# Patient Record
Sex: FEMALE | Race: WHITE | Employment: UNEMPLOYED | URBAN - METROPOLITAN AREA
[De-identification: names, ages, dates, MRNs, and addresses within clinical notes are randomized per-mention and may not be internally consistent; named-entity substitution may affect disease eponyms.]

---

## 2017-08-28 ENCOUNTER — HOSPITAL ENCOUNTER (OUTPATIENT)
Dept: OTHER | Age: 27
Discharge: HOME | End: 2017-08-28
Attending: FAMILY MEDICINE | Admitting: FAMILY MEDICINE

## 2017-08-28 LAB
BASOPHILS ABSOLUTE: 0 K/UL (ref 0–0.2)
BASOPHILS RELATIVE PERCENT: 0.8 %
EOSINOPHILS ABSOLUTE: 0.1 K/UL (ref 0–0.6)
EOSINOPHILS RELATIVE PERCENT: 1.8 %
HBV SURFACE AB TITR SER: 486 MUL/ML
HCT VFR BLD CALC: 43.1 % (ref 36–48)
HEMOGLOBIN: 14.6 G/DL (ref 12–16)
HEPATITIS B SURFACE ANTIGEN INTERPRETATION: NORMAL
HEPATITIS C ANTIBODY INTERPRETATION: NORMAL
LYMPHOCYTES ABSOLUTE: 1.8 K/UL (ref 1–5.1)
LYMPHOCYTES RELATIVE PERCENT: 32.9 %
MCH RBC QN AUTO: 30.6 PG (ref 26–34)
MCHC RBC AUTO-ENTMCNC: 33.8 G/DL (ref 31–36)
MCV RBC AUTO: 90.5 FL (ref 80–100)
MONOCYTES ABSOLUTE: 0.3 K/UL (ref 0–1.3)
MONOCYTES RELATIVE PERCENT: 5 %
NEUTROPHILS ABSOLUTE: 3.2 K/UL (ref 1.7–7.7)
NEUTROPHILS RELATIVE PERCENT: 59.5 %
PDW BLD-RTO: 13.4 % (ref 12.4–15.4)
PLATELET # BLD: 152 K/UL (ref 135–450)
PMV BLD AUTO: 12.6 FL (ref 5–10.5)
RBC # BLD: 4.76 M/UL (ref 4–5.2)
WBC # BLD: 5.4 K/UL (ref 4–11)

## 2017-08-29 LAB
A/G RATIO: 1.3 (ref 1.1–2.2)
ALBUMIN SERPL-MCNC: 4.5 G/DL (ref 3.4–5)
ALP BLD-CCNC: 84 U/L (ref 40–129)
ALT SERPL-CCNC: 12 U/L (ref 10–40)
ANION GAP SERPL CALCULATED.3IONS-SCNC: 14 MMOL/L (ref 3–16)
AST SERPL-CCNC: 14 U/L (ref 15–37)
BILIRUB SERPL-MCNC: 0.6 MG/DL (ref 0–1)
BUN BLDV-MCNC: 13 MG/DL (ref 7–20)
CALCIUM SERPL-MCNC: 9.7 MG/DL (ref 8.3–10.6)
CHLORIDE BLD-SCNC: 102 MMOL/L (ref 99–110)
CO2: 24 MMOL/L (ref 21–32)
CREAT SERPL-MCNC: 0.6 MG/DL (ref 0.6–1.1)
GFR AFRICAN AMERICAN: >60
GFR NON-AFRICAN AMERICAN: >60
GLOBULIN: 3.5 G/DL
GLUCOSE BLD-MCNC: 83 MG/DL (ref 70–99)
HIV-1 AND HIV-2 ANTIBODIES: NORMAL
POTASSIUM SERPL-SCNC: 4.5 MMOL/L (ref 3.5–5.1)
RPR: NORMAL
SODIUM BLD-SCNC: 140 MMOL/L (ref 136–145)
TOTAL PROTEIN: 8 G/DL (ref 6.4–8.2)

## 2018-02-28 ENCOUNTER — HOSPITAL ENCOUNTER (OUTPATIENT)
Dept: OTHER | Age: 28
Discharge: HOME | End: 2018-02-28
Attending: PSYCHIATRY & NEUROLOGY | Admitting: PSYCHIATRY & NEUROLOGY

## 2018-03-01 LAB
A/G RATIO: 1.7 (ref 1.1–2.2)
ALBUMIN SERPL-MCNC: 4.7 G/DL (ref 3.4–5)
ALP BLD-CCNC: 68 U/L (ref 40–129)
ALT SERPL-CCNC: 11 U/L (ref 10–40)
ANION GAP SERPL CALCULATED.3IONS-SCNC: 11 MMOL/L (ref 3–16)
AST SERPL-CCNC: 13 U/L (ref 15–37)
BILIRUB SERPL-MCNC: 0.3 MG/DL (ref 0–1)
BUN BLDV-MCNC: 17 MG/DL (ref 7–20)
CALCIUM SERPL-MCNC: 9.1 MG/DL (ref 8.3–10.6)
CHLORIDE BLD-SCNC: 104 MMOL/L (ref 99–110)
CO2: 25 MMOL/L (ref 21–32)
CREAT SERPL-MCNC: 0.7 MG/DL (ref 0.6–1.1)
GFR AFRICAN AMERICAN: >60
GFR NON-AFRICAN AMERICAN: >60
GLOBULIN: 2.8 G/DL
GLUCOSE BLD-MCNC: 89 MG/DL (ref 70–99)
POTASSIUM SERPL-SCNC: 4.3 MMOL/L (ref 3.5–5.1)
SODIUM BLD-SCNC: 140 MMOL/L (ref 136–145)
TOTAL PROTEIN: 7.5 G/DL (ref 6.4–8.2)
TSH SERPL DL<=0.05 MIU/L-ACNC: 0.67 UIU/ML (ref 0.27–4.2)

## 2020-02-04 ENCOUNTER — HOSPITAL ENCOUNTER (EMERGENCY)
Age: 30
Discharge: HOME | End: 2020-02-04
Attending: EMERGENCY MEDICINE
Payer: COMMERCIAL

## 2020-02-04 ENCOUNTER — APPOINTMENT (OUTPATIENT)
Dept: ULTRASOUND IMAGING | Age: 30
End: 2020-02-04
Payer: COMMERCIAL

## 2020-02-04 VITALS
BODY MASS INDEX: 16.46 KG/M2 | WEIGHT: 115 LBS | HEART RATE: 87 BPM | OXYGEN SATURATION: 97 % | TEMPERATURE: 98.3 F | DIASTOLIC BLOOD PRESSURE: 92 MMHG | SYSTOLIC BLOOD PRESSURE: 135 MMHG | HEIGHT: 70 IN | RESPIRATION RATE: 16 BRPM

## 2020-02-04 LAB
A/G RATIO: 1.4 (ref 1.1–2.2)
ABO/RH: NORMAL
ALBUMIN SERPL-MCNC: 4.1 G/DL (ref 3.4–5)
ALP BLD-CCNC: 52 U/L (ref 40–129)
ALT SERPL-CCNC: 22 U/L (ref 10–40)
ANION GAP SERPL CALCULATED.3IONS-SCNC: 9 MMOL/L (ref 3–16)
AST SERPL-CCNC: 18 U/L (ref 15–37)
BACTERIA WET PREP: ABNORMAL
BACTERIA: ABNORMAL /HPF
BASOPHILS ABSOLUTE: 0.1 K/UL (ref 0–0.2)
BASOPHILS RELATIVE PERCENT: 1.6 %
BILIRUB SERPL-MCNC: 0.4 MG/DL (ref 0–1)
BILIRUBIN URINE: NEGATIVE
BLOOD, URINE: ABNORMAL
BUN BLDV-MCNC: 13 MG/DL (ref 7–20)
CALCIUM SERPL-MCNC: 9.1 MG/DL (ref 8.3–10.6)
CHLORIDE BLD-SCNC: 103 MMOL/L (ref 99–110)
CLARITY: CLEAR
CLUE CELLS: ABNORMAL
CO2: 23 MMOL/L (ref 21–32)
COLOR: YELLOW
CREAT SERPL-MCNC: <0.5 MG/DL (ref 0.6–1.1)
EOSINOPHILS ABSOLUTE: 0.1 K/UL (ref 0–0.6)
EOSINOPHILS RELATIVE PERCENT: 1 %
EPITHELIAL CELLS WET PREP: ABNORMAL
EPITHELIAL CELLS, UA: ABNORMAL /HPF
GFR AFRICAN AMERICAN: >60
GFR NON-AFRICAN AMERICAN: >60
GLOBULIN: 2.9 G/DL
GLUCOSE BLD-MCNC: 86 MG/DL (ref 70–99)
GLUCOSE URINE: NEGATIVE MG/DL
GONADOTROPIN, CHORIONIC (HCG) QUANT: 7697 MIU/ML
HCT VFR BLD CALC: 36.2 % (ref 36–48)
HEMOGLOBIN: 12.4 G/DL (ref 12–16)
KETONES, URINE: NEGATIVE MG/DL
LEUKOCYTE ESTERASE, URINE: ABNORMAL
LYMPHOCYTES ABSOLUTE: 2.7 K/UL (ref 1–5.1)
LYMPHOCYTES RELATIVE PERCENT: 39.4 %
MCH RBC QN AUTO: 30.7 PG (ref 26–34)
MCHC RBC AUTO-ENTMCNC: 34.2 G/DL (ref 31–36)
MCV RBC AUTO: 89.7 FL (ref 80–100)
MICROSCOPIC EXAMINATION: YES
MONOCYTES ABSOLUTE: 0.3 K/UL (ref 0–1.3)
MONOCYTES RELATIVE PERCENT: 3.8 %
NEUTROPHILS ABSOLUTE: 3.8 K/UL (ref 1.7–7.7)
NEUTROPHILS RELATIVE PERCENT: 54.2 %
NITRITE, URINE: NEGATIVE
PDW BLD-RTO: 12.5 % (ref 12.4–15.4)
PH UA: 6 (ref 5–8)
PLATELET # BLD: 147 K/UL (ref 135–450)
PMV BLD AUTO: 10.7 FL (ref 5–10.5)
POTASSIUM SERPL-SCNC: 3.9 MMOL/L (ref 3.5–5.1)
PROTEIN UA: NEGATIVE MG/DL
RBC # BLD: 4.04 M/UL (ref 4–5.2)
RBC UA: ABNORMAL /HPF (ref 0–2)
RBC WET PREP: ABNORMAL
SODIUM BLD-SCNC: 135 MMOL/L (ref 136–145)
SOURCE WET PREP: ABNORMAL
SPECIFIC GRAVITY UA: >=1.03 (ref 1–1.03)
TOTAL PROTEIN: 7 G/DL (ref 6.4–8.2)
TRICHOMONAS PREP: ABNORMAL
URINE TYPE: ABNORMAL
UROBILINOGEN, URINE: 1 E.U./DL
WBC # BLD: 7 K/UL (ref 4–11)
WBC UA: ABNORMAL /HPF (ref 0–5)
WBC WET PREP: ABNORMAL
YEAST WET PREP: ABNORMAL

## 2020-02-04 PROCEDURE — 87491 CHLMYD TRACH DNA AMP PROBE: CPT

## 2020-02-04 PROCEDURE — 99284 EMERGENCY DEPT VISIT MOD MDM: CPT

## 2020-02-04 PROCEDURE — 81001 URINALYSIS AUTO W/SCOPE: CPT

## 2020-02-04 PROCEDURE — 6370000000 HC RX 637 (ALT 250 FOR IP): Performed by: NURSE PRACTITIONER

## 2020-02-04 PROCEDURE — 87210 SMEAR WET MOUNT SALINE/INK: CPT

## 2020-02-04 PROCEDURE — 87591 N.GONORRHOEAE DNA AMP PROB: CPT

## 2020-02-04 PROCEDURE — 86901 BLOOD TYPING SEROLOGIC RH(D): CPT

## 2020-02-04 PROCEDURE — 76801 OB US < 14 WKS SINGLE FETUS: CPT

## 2020-02-04 PROCEDURE — 80053 COMPREHEN METABOLIC PANEL: CPT

## 2020-02-04 PROCEDURE — 86900 BLOOD TYPING SEROLOGIC ABO: CPT

## 2020-02-04 PROCEDURE — 85025 COMPLETE CBC W/AUTO DIFF WBC: CPT

## 2020-02-04 PROCEDURE — 84702 CHORIONIC GONADOTROPIN TEST: CPT

## 2020-02-04 RX ORDER — CEPHALEXIN 500 MG/1
500 CAPSULE ORAL 2 TIMES DAILY
Qty: 14 CAPSULE | Refills: 0 | Status: SHIPPED | OUTPATIENT
Start: 2020-02-04 | End: 2020-02-11

## 2020-02-04 RX ORDER — VITAMIN A ACETATE, BETA CAROTENE, ASCORBIC ACID, CHOLECALCIFEROL, .ALPHA.-TOCOPHEROL ACETATE, DL-, THIAMINE MONONITRATE, RIBOFLAVIN, NIACINAMIDE, PYRIDOXINE HYDROCHLORIDE, FOLIC ACID, CYANOCOBALAMIN, CALCIUM CARBONATE, FERROUS FUMARATE, ZINC OXIDE, CUPRIC OXIDE 3080; 12; 120; 400; 1; 1.84; 3; 20; 22; 920; 25; 200; 27; 10; 2 [IU]/1; UG/1; MG/1; [IU]/1; MG/1; MG/1; MG/1; MG/1; MG/1; [IU]/1; MG/1; MG/1; MG/1; MG/1; MG/1
1 TABLET, FILM COATED ORAL DAILY
Qty: 30 TABLET | Refills: 0 | Status: SHIPPED | OUTPATIENT
Start: 2020-02-04 | End: 2020-06-01 | Stop reason: SDUPTHER

## 2020-02-04 RX ORDER — METRONIDAZOLE 250 MG/1
2000 TABLET ORAL ONCE
Status: COMPLETED | OUTPATIENT
Start: 2020-02-04 | End: 2020-02-04

## 2020-02-04 RX ADMIN — METRONIDAZOLE 2000 MG: 250 TABLET ORAL at 18:10

## 2020-02-04 NOTE — ED PROVIDER NOTES
[facility] Emergency Department    CHIEF COMPLAINT  Vaginal Bleeding (was about 5-7 weeks pregnant has not OBGYN yet. Positive home test. Started bleeding last night, bright red blood. Denies any pain, cramping, or passing any clots. pt is wearing the same pad from last night. )      HISTORY OF PRESENT ILLNESS  Virgilio Dejesus is a 26 y.o. female who presents to the ED complaining of vaginal bleeding. Patient is G4, P3. Patient reports last menstrual period was December 18. Patient reports that she took 4 on home urine pregnancy test this past Friday which were all positive. Patient reports bright red vaginal bleeding started yesterday. Patient reports that the bleeding is light. Patient reports she has had the same pad on further the last 24 hours and has not had to change it. Patient denies fever, chills, body aches, dizziness, syncope, chest pain, shortness of breath, abdominal pain, nausea, vomiting, diarrhea, constipation, dysuria, hematuria, urinary frequency or urgency, flank pain. Patient denies identifiable aggravating or alleviating factors. Patient is not established with an OB/GYN and has not received any prenatal care for this pregnancy. No other complaints, modifying factors or associated symptoms. Nursing notes reviewed. Past Medical History:   Diagnosis Date    Heroin abuse ([facility] [address]     Scarlet fever     Strep throat      Past Surgical History:   Procedure Laterality Date    TONSILLECTOMY AND ADENOIDECTOMY       History reviewed. No pertinent family history.   Social History     Socioeconomic History    Marital status: Single     Spouse name: Not on file    Number of children: Not on file    Years of education: Not on file    Highest education level: Not on file   Occupational History    Not on file   Social Needs    Financial resource strain: Not on file    Food insecurity:     Worry: Not on file     Inability: Not on file   [employer] grossly intact. ENT: Mucous membranes are moist.   NECK: Supple. Normal ROM. HEART: RRR. No murmurs. Distal pulses are equal and intact. Cap refill less than 2 seconds. LUNGS: Respirations unlabored. CTAB. Good air exchange. Speaking comfortably in full sentences. No wheezing, rhonchi, rales, stridor. ABDOMEN: Soft. Non-distended. Non-tender. No guarding or rebound. No masses. No organomegaly. No rigidity. Bowel sounds are present in all 4 quadrants. Negative blanchard's. Negative McBurney's point. Negative CVA tenderness. EXTREMITIES: No peripheral edema. Moves all extremities equally. All extremities neurovascularly intact. SKIN: Warm and dry. No acute rashes. NEUROLOGICAL: Alert and oriented. CN's 2-12 intact. No gross facial drooping. Strength 5/5, sensation intact. No dysarthria. No dysmetria. No ataxia. PSYCHIATRIC: Normal mood and affect. : Vagina is nontender without fusions. There was small amount of dark red blood mixed with malodorous cervical discharge noted to the vaginal vault. The cervical os is observed to be closed, no friability. Bimanual exam revealed no adenexal tenderness, fullness, or masses. There is no CMT. Uterus is not enlarged, non-tender, or deviated. SCREENINGS       RADIOLOGY  Us Transabdominal Transvaginal Less Than 14 Weeks    Result Date: 2/4/2020  EXAMINATION: TRANSABDOMINAL AND TRANSVAGINAL FIRST TRIMESTER OBSTETRIC PELVIC ULTRASOUND WITH COLOR DOPPLER FLOW 2/4/2020 TECHNIQUE: TRANSABDOMINAL AND TRANSVAGINAL PELVIC ULTRASOUND WITH COLOR DOPPLER FLOW COMPARISON: None. HISTORY: ORDERING SYSTEM PROVIDED HISTORY: vaginal bleeding TECHNOLOGIST PROVIDED HISTORY: Reason for exam:->vaginal bleeding FINDINGS: A gestational sac containing a single fetal pole/yolk sac is identified within uterine fundus. Mean crown-rump length of the fetus is 0.41 cm corresponding to estimated gestational age as determined by ultrasound of 6 weeks 1 day +/-1 week.   Estimated date of Range    WBC 7.0 4.0 - 11.0 K/uL    RBC 4.04 4.00 - 5.20 M/uL    Hemoglobin 12.4 12.0 - 16.0 g/dL    Hematocrit 36.2 36.0 - 48.0 %    MCV 89.7 80.0 - 100.0 fL    MCH 30.7 26.0 - 34.0 pg    MCHC 34.2 31.0 - 36.0 g/dL    RDW 12.5 12.4 - 15.4 %    Platelets 752 084 - 085 K/uL    MPV 10.7 (H) 5.0 - 10.5 fL    Neutrophils % 54.2 %    Lymphocytes % 39.4 %    Monocytes % 3.8 %    Eosinophils % 1.0 %    Basophils % 1.6 %    Neutrophils Absolute 3.8 1.7 - 7.7 K/uL    Lymphocytes Absolute 2.7 1.0 - 5.1 K/uL    Monocytes Absolute 0.3 0.0 - 1.3 K/uL    Eosinophils Absolute 0.1 0.0 - 0.6 K/uL    Basophils Absolute 0.1 0.0 - 0.2 K/uL   Comprehensive Metabolic Panel   Result Value Ref Range    Sodium 135 (L) 136 - 145 mmol/L    Potassium 3.9 3.5 - 5.1 mmol/L    Chloride 103 99 - 110 mmol/L    CO2 23 21 - 32 mmol/L    Anion Gap 9 3 - 16    Glucose 86 70 - 99 mg/dL    BUN 13 7 - 20 mg/dL    CREATININE <0.5 (L) 0.6 - 1.1 mg/dL    GFR Non-African American >60 >60    GFR African American >60 >60    Calcium 9.1 8.3 - 10.6 mg/dL    Total Protein 7.0 6.4 - 8.2 g/dL    Alb 4.1 3.4 - 5.0 g/dL    Albumin/Globulin Ratio 1.4 1.1 - 2.2    Total Bilirubin 0.4 0.0 - 1.0 mg/dL    Alkaline Phosphatase 52 40 - 129 U/L    ALT 22 10 - 40 U/L    AST 18 15 - 37 U/L    Globulin 2.9 g/dL   HCG, Quantitative, Pregnancy   Result Value Ref Range    hCG Quant 7697.0 <5.0 mIU/mL   Urinalysis, reflex to microscopic   Result Value Ref Range    Color, UA Yellow Straw/Yellow    Clarity, UA Clear Clear    Glucose, Ur Negative Negative mg/dL    Bilirubin Urine Negative Negative    Ketones, Urine Negative Negative mg/dL    Specific Gravity, UA >=1.030 1.005 - 1.030    Blood, Urine SMALL (A) Negative    pH, UA 6.0 5.0 - 8.0    Protein, UA Negative Negative mg/dL    Urobilinogen, Urine 1.0 <2.0 E.U./dL    Nitrite, Urine Negative Negative    Leukocyte Esterase, Urine TRACE (A) Negative    Microscopic Examination YES     Urine Type NotGiven    Microscopic Urinalysis Result Value Ref Range    WBC, UA 20-50 (A) 0 - 5 /HPF    RBC, UA 5-10 (A) 0 - 2 /HPF    Epi Cells 5-10 /HPF    Bacteria, UA 1+ (A) /HPF   ABO/RH   Result Value Ref Range    ABO/Rh A POS        I estimate there is LOW risk for ACUTE APPENDICITIS, BOWEL OBSTRUCTION, CHOLECYSTITIS, DIVERTICULITIS, INCARCERATED HERNIA, PANCREATITIS, PELVIC INFLAMMATORY DISEASE, PERFORATED BOWEL or ULCER, or TUBO-OVARIAN ABSCESS, thus I consider the discharge disposition reasonable. Also, there is no evidence or peritonitis, sepsis, or toxicity. Vinnie De Leon and I have discussed the diagnosis and risks, and we agree with discharging home to follow-up with their primary doctor. We also discussed returning to the Emergency Department immediately if new or worsening symptoms occur. We have discussed the symptoms which are most concerning (e.g., bloody stool, fever, changing or worsening pain, vomiting) that necessitate immediate return. Final Impression    1. Vaginal bleeding in pregnancy    2. Trichomonosis    3. Bacterial vaginosis    4. Acute cystitis without hematuria        Blood pressure (!) 135/92, pulse 87, temperature 98.3 °F (36.8 °C), temperature source Oral, resp. rate 16, height 5' 10\" (1.778 m), weight 115 lb (52.2 kg), last menstrual period 12/18/2019, SpO2 97 %, not currently breastfeeding.mdm    Patient was sent home with a prescription for below medication/s. I did [place] patient on appropriate use of these medication.   Discharge Medication List as of 2/4/2020  7:05 PM      START taking these medications    Details   Prenatal Vit-Fe Fumarate-FA (PRENATAL PLUS) 27-1 MG TABS Take 1 tablet by mouth daily, Disp-30 tablet, R-0Print      cephALEXin (KEFLEX) 500 MG capsule Take 1 capsule by mouth 2 times daily for 7 days, Disp-14 capsule, R-0Print                 FOLLOW UP  Carla Camacho,   [address] [address] [facility] [id number] [phone]    Schedule an appointment as soon as possible for a visit   follow up    [facility]  ED  [address]  Go to   As needed, If symptoms worsen      DISPOSITION  Patient was discharged to home in good condition. Comment: Please note this report has been produced using speech recognition software and may contain errors related to that system including errors in grammar, punctuation, and spelling, as well as words and phrases that may be inappropriate. If there are any questions or concerns please feel free to contact the dictating provider for clarification.             [id number], APRN - CNP  02/04/20 2025

## 2020-02-04 NOTE — ED PROVIDER NOTES
this plan and is discharged home. FINAL IMPRESSION      1. Vaginal bleeding in pregnancy    2. Trichomonosis    3. Bacterial vaginosis    4.  Acute cystitis without hematuria               Mary Jo Hua MD  02/04/20 [id number]

## 2020-02-05 LAB
C TRACH DNA GENITAL QL NAA+PROBE: NEGATIVE
N. GONORRHOEAE DNA: NEGATIVE

## 2020-02-07 ENCOUNTER — OFFICE VISIT (OUTPATIENT)
Dept: OBGYN CLINIC | Age: 30
End: 2020-02-07
Payer: COMMERCIAL

## 2020-02-07 ENCOUNTER — INITIAL PRENATAL (OUTPATIENT)
Dept: OBGYN CLINIC | Age: 30
End: 2020-02-07
Payer: COMMERCIAL

## 2020-02-07 VITALS
DIASTOLIC BLOOD PRESSURE: 64 MMHG | HEART RATE: 66 BPM | BODY MASS INDEX: 16.5 KG/M2 | SYSTOLIC BLOOD PRESSURE: 98 MMHG | WEIGHT: 115 LBS

## 2020-02-07 LAB
ABO/RH: NORMAL
ANTIBODY SCREEN: NORMAL
BASOPHILS ABSOLUTE: 0.1 K/UL (ref 0–0.2)
BASOPHILS RELATIVE PERCENT: 1 %
BILIRUBIN URINE: NEGATIVE
BLOOD, URINE: NEGATIVE
CLARITY: CLEAR
COLOR: YELLOW
CRL: NORMAL
EOSINOPHILS ABSOLUTE: 0.1 K/UL (ref 0–0.6)
EOSINOPHILS RELATIVE PERCENT: 1.6 %
EPITHELIAL CELLS, UA: 3 /HPF (ref 0–5)
GLUCOSE URINE: NEGATIVE MG/DL
HCT VFR BLD CALC: 38.7 % (ref 36–48)
HEMOGLOBIN: 13 G/DL (ref 12–16)
HYALINE CASTS: 6 /LPF (ref 0–8)
KETONES, URINE: NEGATIVE MG/DL
LEUKOCYTE ESTERASE, URINE: NEGATIVE
LYMPHOCYTES ABSOLUTE: 2.6 K/UL (ref 1–5.1)
LYMPHOCYTES RELATIVE PERCENT: 44.1 %
MCH RBC QN AUTO: 30.5 PG (ref 26–34)
MCHC RBC AUTO-ENTMCNC: 33.6 G/DL (ref 31–36)
MCV RBC AUTO: 90.8 FL (ref 80–100)
MICROSCOPIC EXAMINATION: NORMAL
MONOCYTES ABSOLUTE: 0.2 K/UL (ref 0–1.3)
MONOCYTES RELATIVE PERCENT: 4.1 %
NEUTROPHILS ABSOLUTE: 2.8 K/UL (ref 1.7–7.7)
NEUTROPHILS RELATIVE PERCENT: 49.2 %
NITRITE, URINE: NEGATIVE
PDW BLD-RTO: 12.5 % (ref 12.4–15.4)
PH UA: 6 (ref 5–8)
PLATELET # BLD: 155 K/UL (ref 135–450)
PMV BLD AUTO: 12.1 FL (ref 5–10.5)
PROTEIN UA: NEGATIVE MG/DL
RBC # BLD: 4.26 M/UL (ref 4–5.2)
RBC UA: 3 /HPF (ref 0–4)
RUBELLA ANTIBODY IGG: 147.3 IU/ML
SAC DIAMETER: NORMAL
SPECIFIC GRAVITY UA: 1.02 (ref 1–1.03)
TSH REFLEX: 1.1 UIU/ML (ref 0.27–4.2)
URINE TYPE: NORMAL
UROBILINOGEN, URINE: 0.2 E.U./DL
WBC # BLD: 5.8 K/UL (ref 4–11)
WBC UA: 2 /HPF (ref 0–5)

## 2020-02-07 PROCEDURE — 99204 OFFICE O/P NEW MOD 45 MIN: CPT | Performed by: OBSTETRICS & GYNECOLOGY

## 2020-02-07 PROCEDURE — 76801 OB US < 14 WKS SINGLE FETUS: CPT | Performed by: OBSTETRICS & GYNECOLOGY

## 2020-02-07 PROCEDURE — 36415 COLL VENOUS BLD VENIPUNCTURE: CPT | Performed by: OBSTETRICS & GYNECOLOGY

## 2020-02-07 NOTE — PROGRESS NOTES
Temp: 98.3 f oral    Maternal emotional well being screening form completed and reviewed with patient. Current score is 0. Patient given referral to [address] [place] [address] ([phone]): No      Patient needs to have Urine Drug Screen at follow up appointment      Blood draw from R Antecubital x 1 attempt without difficulty. 2 SST, 1 PST, 2 LAV tubes drawn. Patient tolerated well.  Ximena Sevilla

## 2020-02-07 NOTE — PROGRESS NOTES
[facility]       [place] History and Physical      HPI: Ivonne Blancas is a 26 y.o. [id number] who presents for new OB visit. She had a home (+) Pregnancy test a few weeks ago, very sure of her dates. Patient's last menstrual period was 12/18/2019. States on Monday she started having vaginal bleeding, states it was like a heavy period, then Tuesday it persistent and she started cramping, went to the ED where she was diagnosed with IUP and Trichomonas infection. She was also diagnosed with UTI. She was given antibiotics and instructions for partner to be treated prior to resuming intercourse - today she verbalizes understanding and adherence to these instructions. States bleeding is now very light now. Denies pelvic pain / cramping. 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 Has been using Prenatal Vitamins prescribed from ED. Denies nausea / emesis. Denies fevers / chills / SOB / CP. Denies dysuria / hematuria. Review of Systems:   Review of Systems   Constitutional: Negative for activity change, appetite change and fatigue. Eyes: Negative for photophobia and visual disturbance. Respiratory: Negative for shortness of breath. Cardiovascular: Negative for chest pain, palpitations and leg swelling. Gastrointestinal: Negative for abdominal pain, nausea and vomiting. Genitourinary: Positive for vaginal bleeding, vaginal discharge and vaginal pain. Negative for difficulty urinating.        (+) Menstrual Problem; absence of Menses   (+) Pelvic Cramping; Mild    Skin: Negative for color change. Neurological: Negative for dizziness and numbness. Hematological: Negative for adenopathy. Does not bruise/bleed easily. Psychiatric/Behavioral: Negative for behavioral problems. The patient is not nervous/anxious.         Gynecologic History:   Last PAP: Believed it was within last 2 years   Abnormal PAP: ? Pre Cancer / 2012 / Had LEEP done    - repeat was normal    - Serena Brown has records, will request today    Hx of STI: Y - Hx of Trichomonas x 2 (one in teens and then again several days ago). Obstetrical History:  OB History        4    Para   3    Term   2       1    AB        Living   3       SAB        TAB        Ectopic        Molar        Multiple        Live Births   3            1st -  at term (F)  2nd -  at Premature (34 wks) (M), needed D and C for retained POC during post partum period    3rd - PLTCS at term due to Breech (M), needed STAT section for presentation (records requested)     States she has a hx of fast deliveries     Past Medical History:   Past Medical History:   Diagnosis Date    Abnormal Pap smear of cervix     Depression     Heroin abuse (HCC)     Scarlet fever     Strep throat      Medications:  Current Outpatient Medications on File Prior to Visit   Medication Sig Dispense Refill    gabapentin (NEURONTIN) 100 MG capsule Take 100 mg by mouth 3 times daily.  Prenatal Vit-Fe Fumarate-FA (PRENATAL PLUS) 27-1 MG TABS Take 1 tablet by mouth daily 30 tablet 0    cephALEXin (KEFLEX) 500 MG capsule Take 1 capsule by mouth 2 times daily for 7 days 14 capsule 0    buprenorphine-naloxone (SUBOXONE) 8-2 MG SUBL SL tablet Place 0.5 tablets under the tongue daily. No current facility-administered medications on file prior to visit. See Ashley Austin, Rheumatology, for Fibromyalgia / maybe RA    Recovering heroin addict, on Suboxone (1 mg), started Gabapentin (as of ) as a way to transition off of Suboxone completely   Has a plan within the next week to be completely off Suboxone [facility]  Declines consultation with partner for treatment during pregnancy   Declines counseling referral at this time     Allergies:  Patient has no known allergies.     Surgical History:  Past Surgical History:   Procedure Laterality Date     SECTION      DILATION AND CURETTAGE  TONSILLECTOMY AND ADENOIDECTOMY         Family History:  Family History   Problem Relation Age of Onset    Cancer Maternal Grandmother      Denies family hx of female cancers     Social History:  Social History     Substance and Sexual Activity   Alcohol Use No     Social History     Substance and Sexual Activity   Drug Use No    Comment: prio heroin     Social History     Tobacco Use   Smoking Status Current Every Day Smoker    Packs/day: 0.50    Years: 15.00    Pack years: 7.50    Types: Cigarettes   Smokeless Tobacco Never Used   Tobacco use - 1/2 ppd   Marijuana use, everyday smoker, trying to cut down   Denies alcohol use     Physical Exam:  BP 98/64   Pulse 66   Wt 115 lb (52.2 kg)   LMP 12/18/2019   BMI 16.50 kg/m²   General: Alert, well appearing, no acute distress  HEENT: Normocephalic, atraumatic, dentition normal, no thyromegaly   Lungs: Clear to auscultation bilaterally without rales, rhonchi, wheezing  CV: Regular rate and regular rhythm, normal S1, S2 without murmurs, rubs, clicks or gallops. Abdomen: Gravid , soft, nontender   Pelvic exam: VULVA: normal appearing vulva with no masses, tenderness or lesions, VAGINA: normal appearing vagina with normal color and discharge, no lesions, CERVIX: friable, lesions absent, cervical discharge present - bloody and thin, UTERUS: uterus is normal size, shape, consistency and nontender, about 6-7wk size, ADNEXA: normal adnexa in size, nontender and no masses, exam chaperoned by female assistant.   Extremities: No redness or tenderness  Skin: Well perfused, normal coloration and turgor, no lesions or rashes visualized  Neuro: Alert, oriented, normal speech, no focal deficits, moves extremities appropriately  Psych: Appropriate, normal affect, appears stated age  Osteopathic: No TART changes    Most Recent Ultrasound:  Narrative   EXAMINATION:   TRANSABDOMINAL AND TRANSVAGINAL FIRST TRIMESTER OBSTETRIC PELVIC ULTRASOUND   WITH COLOR DOPPLER FLOW     2/4/2020       TECHNIQUE:   TRANSABDOMINAL AND TRANSVAGINAL PELVIC ULTRASOUND WITH COLOR DOPPLER FLOW       COMPARISON:   None.       HISTORY:   ORDERING SYSTEM PROVIDED HISTORY: vaginal bleeding   TECHNOLOGIST PROVIDED HISTORY:   Reason for exam:->vaginal bleeding       FINDINGS:   A gestational sac containing a single fetal pole/yolk sac is identified   within uterine fundus.  Mean crown-rump length of the fetus is 0.41 cm   corresponding to estimated gestational age as determined by ultrasound of 6   weeks 1 day +/-1 week.  Estimated date of delivery as determined by   ultrasound is 09/28/2020.  Estimated gestational age as determined by LMP is   6 weeks 6 days with estimated due date of 09/23/2020.       On transverse images, the gestational sac is visualized toward the region of   the right uterine cornua.  On the same image, there is visualization of a   subtle focal area of hypoechogenicity measuring 0.87 cm x 0.54 cm.  This area   of hypoechogenicity may represent minimally complex fluid/debris within the   endometrial canal along the region of the left uterine cornua.       Right ovary measures 2.7 x 2.6 x 1.8 cm.  Doppler signal was elicited from   the right ovary.  Left ovary measures 2.1 x 1.8 x 2.1 cm.  Doppler signal was   elicited from the left ovary.  No significant free fluid identified within   the pelvis.           Impression   1. Single live intrauterine pregnancy with estimated gestational age as   determined ultrasound of 6 weeks 1 day +/-1 week.  Estimated date of delivery   as determined by ultrasound of 09/28/2020.     2. Small subcentimeter area of hypoechogenicity centered in the region of the   endometrial canal near the left cornua.  Finding may represent mildly complex   fluid/blood.  Short-term follow-up examination may be helpful for   re-evaluation. Images:  OBSTETRIC ULTRASOUND--1ST TRIMESTER    DATE: 2/7/20    PHYSICIAN: LEATHA Rausch.     SONOGRAPHER: Candace Lo

## 2020-02-08 PROBLEM — Z34.91 PRENATAL CARE IN FIRST TRIMESTER: Status: ACTIVE | Noted: 2020-02-08

## 2020-02-08 PROBLEM — Z87.42 HISTORY OF ABNORMAL CERVICAL PAP SMEAR: Status: ACTIVE | Noted: 2020-02-08

## 2020-02-08 PROBLEM — F11.20 SUBOXONE MAINTENANCE TREATMENT COMPLICATING PREGNANCY, ANTEPARTUM, FIRST TRIMESTER (HCC): Status: ACTIVE | Noted: 2020-02-08

## 2020-02-08 PROBLEM — F12.90 MARIJUANA USE: Status: ACTIVE | Noted: 2020-02-08

## 2020-02-08 PROBLEM — Z20.2 POSSIBLE EXPOSURE TO STD: Status: ACTIVE | Noted: 2020-02-08

## 2020-02-08 PROBLEM — O41.8X10 SUBCHORIONIC HEMORRHAGE IN FIRST TRIMESTER: Status: ACTIVE | Noted: 2020-02-08

## 2020-02-08 PROBLEM — O99.331 MATERNAL TOBACCO USE IN FIRST TRIMESTER: Status: ACTIVE | Noted: 2020-02-08

## 2020-02-08 PROBLEM — Z98.891 HX OF CESAREAN SECTION: Status: ACTIVE | Noted: 2020-02-08

## 2020-02-08 PROBLEM — A59.01 TRICHOMONAL VAGINITIS DURING PREGNANCY IN FIRST TRIMESTER: Status: ACTIVE | Noted: 2020-02-08

## 2020-02-08 PROBLEM — F11.11 HISTORY OF HEROIN ABUSE (HCC): Status: ACTIVE | Noted: 2020-02-08

## 2020-02-08 PROBLEM — O46.8X1 SUBCHORIONIC HEMORRHAGE IN FIRST TRIMESTER: Status: ACTIVE | Noted: 2020-02-08

## 2020-02-08 PROBLEM — O99.321 SUBOXONE MAINTENANCE TREATMENT COMPLICATING PREGNANCY, ANTEPARTUM, FIRST TRIMESTER (HCC): Status: ACTIVE | Noted: 2020-02-08

## 2020-02-08 PROBLEM — O23.591 TRICHOMONAL VAGINITIS DURING PREGNANCY IN FIRST TRIMESTER: Status: ACTIVE | Noted: 2020-02-08

## 2020-02-08 PROBLEM — O23.41 URINARY TRACT INFECTION IN MOTHER DURING FIRST TRIMESTER OF PREGNANCY: Status: ACTIVE | Noted: 2020-02-08

## 2020-02-08 LAB
HIV AG/AB: NORMAL
HIV ANTIGEN: NORMAL
HIV-1 ANTIBODY: NORMAL
HIV-2 AB: NORMAL
TOTAL SYPHILLIS IGG/IGM: NORMAL
VARICELLA-ZOSTER VIRUS AB, IGG: NORMAL

## 2020-02-08 ASSESSMENT — ENCOUNTER SYMPTOMS
SHORTNESS OF BREATH: 0
VOMITING: 0
NAUSEA: 0
ABDOMINAL PAIN: 0
COLOR CHANGE: 0
PHOTOPHOBIA: 0

## 2020-02-09 LAB — URINE CULTURE, ROUTINE: NORMAL

## 2020-02-11 LAB
HPV COMMENT: ABNORMAL
HPV TYPE 16: NOT DETECTED
HPV TYPE 18: NOT DETECTED
HPVOH (OTHER TYPES): DETECTED

## 2020-02-13 ENCOUNTER — TELEPHONE (OUTPATIENT)
Dept: OBGYN CLINIC | Age: 30
End: 2020-02-13

## 2020-02-13 NOTE — TELEPHONE ENCOUNTER
Pt seen Friday as ED follow up/ early pregnancy. She states she was told to call the office if she had any issue with her Suboxone. She reports she is struggling and would like to try and transition to Subutex because this is \"too hard\" She also wanted you to know that she is still having bleeding. It is a small amount and dark red and has not increased since seen in office. She denies pain or cramps.

## 2020-02-14 NOTE — TELEPHONE ENCOUNTER
Attempted to call patient, mobile number not in service, home number busy x 2. Patient will need an appointment with Dr Maryann Brennan for medication discussion. Will not need detox if already established on medication. Will need to get a good phone number for patient for continued follow up.

## 2020-03-02 ENCOUNTER — ROUTINE PRENATAL (OUTPATIENT)
Dept: OBGYN CLINIC | Age: 30
End: 2020-03-02
Payer: COMMERCIAL

## 2020-03-02 VITALS
WEIGHT: 118 LBS | DIASTOLIC BLOOD PRESSURE: 62 MMHG | HEART RATE: 98 BPM | BODY MASS INDEX: 16.93 KG/M2 | SYSTOLIC BLOOD PRESSURE: 116 MMHG

## 2020-03-02 PROCEDURE — 99213 OFFICE O/P EST LOW 20 MIN: CPT | Performed by: OBSTETRICS & GYNECOLOGY

## 2020-03-02 PROCEDURE — G8427 DOCREV CUR MEDS BY ELIG CLIN: HCPCS | Performed by: OBSTETRICS & GYNECOLOGY

## 2020-03-02 PROCEDURE — 36415 COLL VENOUS BLD VENIPUNCTURE: CPT | Performed by: OBSTETRICS & GYNECOLOGY

## 2020-03-02 PROCEDURE — G8419 CALC BMI OUT NRM PARAM NOF/U: HCPCS | Performed by: OBSTETRICS & GYNECOLOGY

## 2020-03-02 PROCEDURE — G8484 FLU IMMUNIZE NO ADMIN: HCPCS | Performed by: OBSTETRICS & GYNECOLOGY

## 2020-03-02 PROCEDURE — 4004F PT TOBACCO SCREEN RCVD TLK: CPT | Performed by: OBSTETRICS & GYNECOLOGY

## 2020-03-02 NOTE — PROGRESS NOTES
26 y.o. [id number] at [address] Estimated Date of Delivery: 20 here for LISE:      Pt seen and examined. No concerns/complaints, states spotting has stopped / denies pelvic pain / nausea / emesis. Denies VB, LOF, CTX. MWQ reviewed (score: 0). Here with sister. Off Subutex for three days (used off street <1mg per day). Was previously on Suboxone. Discussed treatment during pregnancy and patient has decided she does not want treatment. Objective:  /62   Pulse 98   Wt 118 lb (53.5 kg)   LMP 2019   BMI 16.93 kg/m²   Gen: AO, NAD  Abd: Soft, NT, gravid   Ext: Mild LE edema  OMM: Increased lumbar lordosis    Assessment/Plan:   Diagnosis Orders   1. Prenatal care in first trimester  Hep C AB RLFX HCV PCR-A    Hepatitis B Surface Antigen    Drug Screen Multi Urine With Bup   2. History of heroin abuse ([facility] [address]     3. Trichomonal vaginitis during pregnancy in first trimester     4. Maternal tobacco use in first trimester     5. Hx of  section     6. ASCUS with positive high risk HPV cervical     7. History of  delivery, currently pregnant     8. History of loop electrical excision procedure (LEEP)        - Pap ASCUS/ HPV Oth, need to schedule Colpo  - Hx of PTD, need to review progesterone therapy and Cervical Length screening at next visit  - Planning for RLTCS at term, will discuss fertility at next visit  - Need JEN for Trich and Rpt Urine Ctx in 4 weeks   - Return precautions reviewed      Follow Up  Return in about 4 weeks (around 3/30/2020) for Return OB visit (needs Colpo, TOCs, CL screening), Ultrasound.     Astrid England DO

## 2020-03-02 NOTE — PROGRESS NOTES
Temp: 98.8 f oral    Maternal emotional well being screening form completed and reviewed with patient. Current score is 0. Patient given referral to [address] [place] [address] ([phone]): No      Patient presents to office for urine drug screen. Patient escorted to exam room, personal belongings left if room and door closed. Excorted patient to restroom. Clean catch urine collection explained. Patient notified to not flush toilet or wash hands and bring specimen out of restroom. Patient initialed ID labels and witnessed application of labels to specimen. Urine temperature read: 94 degree F. Patient permitted to return to restroom to flush toilet and wash hands. Escorted patient back to exam room.  Michael Valdez

## 2020-03-03 LAB
AMPHETAMINE SCREEN, URINE: ABNORMAL
BARBITURATE SCREEN URINE: ABNORMAL
BENZODIAZEPINE SCREEN, URINE: ABNORMAL
BUPRENORPHINE URINE: ABNORMAL
CANNABINOID SCREEN URINE: POSITIVE
COCAINE METABOLITE SCREEN URINE: ABNORMAL
HEPATITIS B SURFACE ANTIGEN INTERPRETATION: NORMAL
Lab: ABNORMAL
METHADONE SCREEN, URINE: ABNORMAL
OPIATE SCREEN URINE: ABNORMAL
OXYCODONE URINE: ABNORMAL
PH UA: 6
PHENCYCLIDINE SCREEN URINE: ABNORMAL
PROPOXYPHENE SCREEN: ABNORMAL

## 2020-03-04 LAB
HEPATITIS C VIRUS AB BY CIA INDEX: 0.03 IV
HEPATITIS C VIRUS AB BY CIA INTERPRETATION: NEGATIVE

## 2020-03-05 NOTE — TELEPHONE ENCOUNTER
No - she has since weaned off meds and declined treatment at last apt. Thanks for following up.      Katherine

## 2020-03-07 PROBLEM — O09.899 HISTORY OF PRETERM DELIVERY, CURRENTLY PREGNANT: Status: ACTIVE | Noted: 2020-03-07

## 2020-03-07 PROBLEM — Z98.890 HISTORY OF LOOP ELECTRICAL EXCISION PROCEDURE (LEEP): Status: ACTIVE | Noted: 2020-03-07

## 2020-03-07 PROBLEM — R87.610 ASCUS WITH POSITIVE HIGH RISK HPV CERVICAL: Status: ACTIVE | Noted: 2020-03-07

## 2020-03-07 PROBLEM — R87.810 ASCUS WITH POSITIVE HIGH RISK HPV CERVICAL: Status: ACTIVE | Noted: 2020-03-07

## 2020-03-10 ENCOUNTER — TELEPHONE (OUTPATIENT)
Dept: OBGYN CLINIC | Age: 30
End: 2020-03-10

## 2020-03-10 NOTE — TELEPHONE ENCOUNTER
----- Message from Darcie Mcbride DO sent at 3/7/2020 12:36 PM EST -----  Regarding: Needs to be scheduled for Colpo procedure before next prenatal  Pt had ASCUS, HPV OTHER +   Needs Colpo scheduled before next visit   Thanks     [facility]

## 2020-04-05 ENCOUNTER — TELEPHONE (OUTPATIENT)
Dept: OBGYN CLINIC | Age: 30
End: 2020-04-05

## 2020-04-06 ENCOUNTER — ROUTINE PRENATAL (OUTPATIENT)
Dept: OBGYN CLINIC | Age: 30
End: 2020-04-06
Payer: COMMERCIAL

## 2020-04-06 ENCOUNTER — OFFICE VISIT (OUTPATIENT)
Dept: OBGYN CLINIC | Age: 30
End: 2020-04-06
Payer: COMMERCIAL

## 2020-04-06 VITALS
BODY MASS INDEX: 18.37 KG/M2 | HEART RATE: 74 BPM | WEIGHT: 128 LBS | DIASTOLIC BLOOD PRESSURE: 58 MMHG | SYSTOLIC BLOOD PRESSURE: 94 MMHG

## 2020-04-06 LAB
ABDOMINAL CIRCUMFERENCE: NORMAL
BIPARIETAL DIAMETER: NORMAL
ESTIMATED FETAL WEIGHT: NORMAL
FEMORAL DIAMETER: NORMAL
HC/AC: NORMAL
HEAD CIRCUMFERENCE: NORMAL

## 2020-04-06 PROCEDURE — G8427 DOCREV CUR MEDS BY ELIG CLIN: HCPCS | Performed by: OBSTETRICS & GYNECOLOGY

## 2020-04-06 PROCEDURE — 76817 TRANSVAGINAL US OBSTETRIC: CPT | Performed by: OBSTETRICS & GYNECOLOGY

## 2020-04-06 PROCEDURE — G8419 CALC BMI OUT NRM PARAM NOF/U: HCPCS | Performed by: OBSTETRICS & GYNECOLOGY

## 2020-04-06 PROCEDURE — 99214 OFFICE O/P EST MOD 30 MIN: CPT | Performed by: OBSTETRICS & GYNECOLOGY

## 2020-04-06 PROCEDURE — 4004F PT TOBACCO SCREEN RCVD TLK: CPT | Performed by: OBSTETRICS & GYNECOLOGY

## 2020-04-06 NOTE — PROGRESS NOTES
Oral temp 97.8  Maternal emotional well being screening form completed and reviewed with patient. Current score is 3. Patient given referral to [address] [place] [address] ([phone]):  No

## 2020-04-06 NOTE — PATIENT INSTRUCTIONS
Cigarette smoking is a preventable cause of death in the [place]. If you have thought about quitting but haven't been able to, here are some reasons why you should and some ways to do it. Here's Why   Quitting smoking now can decrease your risk of getting smoking-related illnesses like:   Heart disease   Stroke   Several types of cancer, including:   Lung   Mouth   Esophagus   Larynx   Bladder   Pancreas   Kidney   Chronic lung diseases:   Bronchitis   Emphysema   Asthma   Cataracts   Macular degeneration   Thyroid conditions   Hearing loss   Erectile dysfunction   Dementia   Osteoporosis   Here's How   Once you've decided to quit smoking, set your target quit date a few weeks away. In the time leading up to your quit day, try some of these ideas offered by the [address] to help you successfully quit smoking. For the best results, work with your doctor. Together, you can test your lung function and compare the results to those of a nonsmoking person. The results can be given to you as your lung age. Finding out your lung age right after having the test done may help you to stop smoking. Your doctor can also discuss with you all of your options and refer you to smoking-cessation support groups. You may wish to use nicotine replacement (gum, patches, inhaler) or one of the prescription medications that have been shown to increase quit rates and prolong abstinence from smoking. But whatever you and your doctor decide on these matters, it will still be you who decides when an how to quit. Here are some techniques:   Switch Brands   Switch to a brand you find distasteful. Change to a brand that is low in tar and nicotine a couple of weeks before your target quit date. This will help change your smoking behavior. However, do not smoke more cigarettes, inhale them more often or more deeply, or place your fingertips over the holes in the filters.  All of

## 2020-04-07 LAB
CANDIDA SPECIES, DNA PROBE: ABNORMAL
GARDNERELLA VAGINALIS, DNA PROBE: ABNORMAL
TRICHOMONAS VAGINALIS DNA: ABNORMAL

## 2020-04-08 LAB — URINE CULTURE, ROUTINE: NORMAL

## 2020-04-09 ENCOUNTER — TELEPHONE (OUTPATIENT)
Dept: OBGYN CLINIC | Age: 30
End: 2020-04-09

## 2020-04-09 NOTE — TELEPHONE ENCOUNTER
Spoke to Roslyn Anderson regarding prior authorization for [facility] under patient medical benefit. Informed no prior approval required for participating provider. Will fax letter. Per Britta referral form forwarded to [facility] for prescription processing.

## 2020-04-16 NOTE — TELEPHONE ENCOUNTER
Spoke to patient, verified she received message. States she did call [facility] and they do have her medication scheduled for shipment and we should receive in office in 3 days. Patient aware we would recall once we receive to schedule 1st injection.

## 2020-04-21 ENCOUNTER — TELEPHONE (OUTPATIENT)
Dept: OBGYN CLINIC | Age: 30
End: 2020-04-21

## 2020-04-21 NOTE — TELEPHONE ENCOUNTER
Can we place this case on a recheck list for later in the week? I would like patient to start her Chante as soon as possible.  Thank you   [facility]

## 2020-04-22 ENCOUNTER — ROUTINE PRENATAL (OUTPATIENT)
Dept: OBGYN CLINIC | Age: 30
End: 2020-04-22
Payer: COMMERCIAL

## 2020-04-22 ENCOUNTER — OFFICE VISIT (OUTPATIENT)
Dept: OBGYN CLINIC | Age: 30
End: 2020-04-22
Payer: COMMERCIAL

## 2020-04-22 VITALS
SYSTOLIC BLOOD PRESSURE: 131 MMHG | DIASTOLIC BLOOD PRESSURE: 64 MMHG | HEART RATE: 101 BPM | WEIGHT: 131.4 LBS | BODY MASS INDEX: 18.85 KG/M2

## 2020-04-22 PROBLEM — O41.8X20 AMNIOTIC BAND IN SECOND TRIMESTER: Status: ACTIVE | Noted: 2020-04-22

## 2020-04-22 PROBLEM — Z34.92 PRENATAL CARE IN SECOND TRIMESTER: Status: ACTIVE | Noted: 2020-02-08

## 2020-04-22 PROCEDURE — 99213 OFFICE O/P EST LOW 20 MIN: CPT | Performed by: OBSTETRICS & GYNECOLOGY

## 2020-04-22 PROCEDURE — 4004F PT TOBACCO SCREEN RCVD TLK: CPT | Performed by: OBSTETRICS & GYNECOLOGY

## 2020-04-22 PROCEDURE — 76817 TRANSVAGINAL US OBSTETRIC: CPT | Performed by: OBSTETRICS & GYNECOLOGY

## 2020-04-22 PROCEDURE — G8420 CALC BMI NORM PARAMETERS: HCPCS | Performed by: OBSTETRICS & GYNECOLOGY

## 2020-04-22 PROCEDURE — G8427 DOCREV CUR MEDS BY ELIG CLIN: HCPCS | Performed by: OBSTETRICS & GYNECOLOGY

## 2020-04-22 NOTE — PROGRESS NOTES
Return OB Office Visit    CC:   Chief Complaint   Patient presents with    Routine Prenatal Visit       HPI:  Pt seen and examined. No concerns/complaints. Denies VB, LOF, ctx. +FM. No other concerns today. Maternal wellness questionnaire reviewed - no concerns today. Score 1. Objective:  /64   Pulse 101   Wt 131 lb 6.4 oz (59.6 kg)   LMP 2019   BMI 18.85 kg/m²   Gen: AO, NAD  Abd: Soft, NT  FHT: 143    Ultrasound:  Impression   OB ULTRASOUND CERVICAL LENGTH       DATE: 20       PHYSICIAN: Billy Low M.D.       SONOGRAPHER: Tyrone Irwin RDMS       INDICATION: Cervical length       COMPARISON: 20       TYPE OF SCAN: vaginal, abdominal       FINDINGS:   A single viable intrauterine pregnancy is noted with a heart rate of 143 bpm. Cardiac and somatic activity are noted.        Transvaginal cervical length is 4.12 cm with no funneling noted. Possible lower uterine segment amniotic band without involvement of fetal limbs.       IMPRESSION:    Single live IUP in the second trimester. Cervical length is 4.12 cm. Possible lower uterine segment amniotic band.        Imaging is limited secondary to fetal position. The patient is well aware of the limitations of ultrasound in the detection of anomalies.             Assessment/Plan:  31 y.o. [id number] at 18w0d (Estimated Date of Delivery: 20) presents for [facility] appointment:      Diagnosis Orders   1. Prenatal care in second trimester     2. History of heroin abuse ([facility] [address]     3. Trichomonal vaginitis during pregnancy in first trimester     4. Hx of  section     5. ASCUS with positive high risk HPV cervical     6. History of  delivery, currently pregnant     7. Amniotic band in second trimester, single or unspecified fetus       - Doing well, routine care  - CL 4.03cm today, continue q2wks until 28wks.  Chante to be delivered this Friday so patient will star that weekly once arrives, labor precautions reviewed  - Possible amniotic

## 2020-04-23 NOTE — TELEPHONE ENCOUNTER
Received call from [address] [place] [address].  Tevin Becker will be shipped to our office on Tuesday April 28 th.

## 2020-04-28 ENCOUNTER — TELEPHONE (OUTPATIENT)
Dept: OBGYN CLINIC | Age: 30
End: 2020-04-28

## 2020-04-29 ENCOUNTER — NURSE ONLY (OUTPATIENT)
Dept: OBGYN CLINIC | Age: 30
End: 2020-04-29
Payer: COMMERCIAL

## 2020-04-29 VITALS
TEMPERATURE: 98.2 F | BODY MASS INDEX: 18.65 KG/M2 | DIASTOLIC BLOOD PRESSURE: 62 MMHG | WEIGHT: 130 LBS | SYSTOLIC BLOOD PRESSURE: 112 MMHG

## 2020-04-29 PROCEDURE — 96372 THER/PROPH/DIAG INJ SC/IM: CPT | Performed by: OBSTETRICS & GYNECOLOGY

## 2020-04-29 NOTE — PROGRESS NOTES
1:23 PM Given [place] (hydroxyprogesterone caproate) 275 mg/1.1 mL auto-injector    Site:Left arm. Lot #[id number]  Expiration Date: 6/2022  [facility] #[phone]. Patient tolerated well. No reaction noted. RTO in 1 week for next injection.     Administered by: Baudilio Denny

## 2020-05-05 ENCOUNTER — TELEPHONE (OUTPATIENT)
Dept: OBGYN CLINIC | Age: 30
End: 2020-05-05

## 2020-05-06 ENCOUNTER — ROUTINE PRENATAL (OUTPATIENT)
Dept: OBGYN CLINIC | Age: 30
End: 2020-05-06
Payer: COMMERCIAL

## 2020-05-06 ENCOUNTER — OFFICE VISIT (OUTPATIENT)
Dept: OBGYN CLINIC | Age: 30
End: 2020-05-06
Payer: COMMERCIAL

## 2020-05-06 VITALS
HEART RATE: 99 BPM | WEIGHT: 133.4 LBS | SYSTOLIC BLOOD PRESSURE: 122 MMHG | DIASTOLIC BLOOD PRESSURE: 68 MMHG | BODY MASS INDEX: 19.14 KG/M2

## 2020-05-06 PROCEDURE — 76816 OB US FOLLOW-UP PER FETUS: CPT | Performed by: OBSTETRICS & GYNECOLOGY

## 2020-05-06 PROCEDURE — 99213 OFFICE O/P EST LOW 20 MIN: CPT | Performed by: OBSTETRICS & GYNECOLOGY

## 2020-05-06 PROCEDURE — G8427 DOCREV CUR MEDS BY ELIG CLIN: HCPCS | Performed by: OBSTETRICS & GYNECOLOGY

## 2020-05-06 PROCEDURE — G8420 CALC BMI NORM PARAMETERS: HCPCS | Performed by: OBSTETRICS & GYNECOLOGY

## 2020-05-06 PROCEDURE — 4004F PT TOBACCO SCREEN RCVD TLK: CPT | Performed by: OBSTETRICS & GYNECOLOGY

## 2020-05-12 ENCOUNTER — TELEPHONE (OUTPATIENT)
Dept: OBGYN CLINIC | Age: 30
End: 2020-05-12

## 2020-05-17 PROBLEM — O35.BXX0 ECHOGENIC FOCUS OF HEART OF FETUS AFFECTING ANTEPARTUM CARE OF MOTHER: Status: ACTIVE | Noted: 2020-05-17

## 2020-05-18 ENCOUNTER — ROUTINE PRENATAL (OUTPATIENT)
Dept: OBGYN CLINIC | Age: 30
End: 2020-05-18
Payer: COMMERCIAL

## 2020-05-18 ENCOUNTER — OFFICE VISIT (OUTPATIENT)
Dept: OBGYN CLINIC | Age: 30
End: 2020-05-18
Payer: COMMERCIAL

## 2020-05-18 VITALS
DIASTOLIC BLOOD PRESSURE: 78 MMHG | SYSTOLIC BLOOD PRESSURE: 114 MMHG | HEART RATE: 99 BPM | BODY MASS INDEX: 20.09 KG/M2 | WEIGHT: 140 LBS

## 2020-05-18 PROBLEM — O99.332 MATERNAL TOBACCO USE IN SECOND TRIMESTER: Status: ACTIVE | Noted: 2020-02-08

## 2020-05-18 PROCEDURE — G8427 DOCREV CUR MEDS BY ELIG CLIN: HCPCS | Performed by: OBSTETRICS & GYNECOLOGY

## 2020-05-18 PROCEDURE — 76817 TRANSVAGINAL US OBSTETRIC: CPT | Performed by: OBSTETRICS & GYNECOLOGY

## 2020-05-18 PROCEDURE — G8420 CALC BMI NORM PARAMETERS: HCPCS | Performed by: OBSTETRICS & GYNECOLOGY

## 2020-05-18 PROCEDURE — 99213 OFFICE O/P EST LOW 20 MIN: CPT | Performed by: OBSTETRICS & GYNECOLOGY

## 2020-05-18 PROCEDURE — 4004F PT TOBACCO SCREEN RCVD TLK: CPT | Performed by: OBSTETRICS & GYNECOLOGY

## 2020-05-18 NOTE — PROGRESS NOTES
No vaginal bleeding, no LOF, no contractions, no pelvic pain, +FM  Admits to right sciatic pain and low back pain--conservative measures reviewed:  Exercises, pregnancy support belt, hydration  Continues to smoke:  THC and tobacco--limited use. Aware of need to quit. Taking neurontin on occasion for fibromyalgia--limiting use. Maternal Wellness Questionnaire reviewed--no concerns. OB ULTRASOUND CERVICAL LENGTH    DATE: 2020    PHYSICIAN: NICO Lowe D.O.    SONOGRAPHER: ROSALIO Cruz [facility]    INDICATION: Cervical length    COMPARISON: 2020    TYPE OF SCAN: vaginal, abdominal    FINDINGS:  A single viable intrauterine pregnancy is noted with a heart rate of 148 bpm. Cardiac and somatic activity are noted. Transvaginal cervical length is 3.64 cm with no funneling noted. IMPRESSION:   Single live IUP in the second trimester. Cervical length is 3.64 cm. Imaging is limited secondary to fetal position. The patient is well aware of the limitations of ultrasound in the detection of anomalies. Diagnosis Orders   1. Prenatal care in second trimester  HYDROXYprogesterone caproate injection 275 mg   2. Amniotic band in second trimester, single or unspecified fetus     3. History of heroin abuse ([facility] [address]     4. Maternal tobacco use in second trimester     5. Marijuana use     6. Hx of  section     7. History of loop electrical excision procedure (LEEP)     8. History of  delivery, currently pregnant     9. Echogenic focus of heart of fetus affecting antepartum care of mother, single or unspecified fetus       [place] injection today  Follow up in 2 weeks for prenatal visit and ultrasound: cervical length, amniotic band, spine, and heart views.

## 2020-05-18 NOTE — PROGRESS NOTES
3:28 PM Given 1 mL [place] (hydroxyprogesterone caproate) 250 mg/mL IM    Site:Left upper arm. Lot #[id number]  Expiration Date: 6/2022  [facility] #[id number]. Patient tolerated well. No reaction noted. RTO in 1 week for next injection.   Patient scheduled for Tuesday 5/26/20 at 3:50pm.    Administered by: Angie Merritt

## 2020-05-18 NOTE — PROGRESS NOTES
Temp: 98.0 Oral f    Maternal emotional well being screening form completed and reviewed with patient. Current score is 0. Patient given referral to [address] [place] [address] ([phone]):  No

## 2020-05-18 NOTE — PROGRESS NOTES
No vaginal bleeding, no LOF, no contractions, +FM  Denies suboxone use since 1st trimester  Continues to use marijuana  Maternal Wellness Questionnaire reviewed--no concerns. OBSTETRIC ULTRASOUND -- SECOND TRIMESTER    DATE:  5/6/2020    PHYSICIAN: NICO Lowe D.O.    SONOGRAPHER: Hang Mackenzie RDMS    INDICATION:  Second trimester, Anatomy screening    COMPARISON: 4/22/20    TYPE OF SCAN: vaginal, abdominal 3.5 MHz 5MHz    FINDINGS:      A single viable intrauterine pregnancy is noted in breech presentation. Cardiac and somatic activity are noted. The following values were obtained:   Fetal heart rate    141 bpm   BPD      4.40cm  19 weeks 2 day(s)   Head Circumference    16.33cm  19 weeks 1 day(s)    Abdominal Circumference   15.84cm  21 weeks 0 day(s)   Femur Length     3.10cm  19 weeks 4 day(s)   Humerus Length    2.94cm  19 weeks 4 day(s)   Cerebellum     2.06cm  19 weeks 5 day(s)   Amniotic fluid DVP    5.25cm   EFW      337g  55.4 percentile    Subjective amniotic fluid volume is normal. Based on sonographic criteria, the estimated fetal age is 19 weeks and 5 days with EDC of 9/25/20. There is a 2 day discordance with the established EDC of 9/23/20. The patient has a fundal placenta that is adequate distance in relation to the internal cervical os. The evaluation of the lower uterine segment and cervix reveals normal appearing anatomy. Transvaginal cervical length is 3.90 cm with no funneling noted. Maternal ovaries and adnexae are not well visualized due to the size of the uterus and patient's gravid state. Possible lower uterine segment amniotic band without involvement of fetal limbs.     Normal Anatomy Seen:  4 chamber heart  CSP  LVOT    Kidneys   Lateral ventricles  Umbilical arteries  Cerebellum  Ductal arch   Bladder   Cisterna magna  Face    Nuchal fold  Diaphragm   Upper extremities  Stomach   Nose/lips   Lower extremities  ACI    PCI    Choroid plexus    Suboptimal anatomy seen:  Spine, RVOT, Profile, Aortic arch    Abnormal anatomy seen:  Left ventricular cardiac focus    The fetal genitalia is noted to be Male. IMPRESSION:  Single living IUP. No gross structural abnormalities are visualized. Amniotic fluid volume is subjectively normal. Left ventricular cardiac focus. Possible lower uterine segment amniotic band. The patient is well aware of the limitations of ultrasound in the detection of fetal anomalies. The scan is limited by fetal position. Diagnosis Orders   1. Prenatal care in second trimester     2. Amniotic band in second trimester, single or unspecified fetus     3. History of heroin abuse ([facility] [address]     4. Hx of  section     5. History of loop electrical excision procedure (LEEP)     6. History of  delivery, currently pregnant     7. Echogenic focus of heart of fetus affecting antepartum care of mother, single or unspecified fetus     6. Marijuana use     9. ASCUS with positive high risk HPV cervical       Ultrasound result reviewed.     Follow up in 2 weeks for prenatal visit and ultrasound--cervical length as well as RVOT, chin, spine, aortic arch and echogenic focus

## 2020-05-22 ENCOUNTER — TELEPHONE (OUTPATIENT)
Dept: OBGYN CLINIC | Age: 30
End: 2020-05-22

## 2020-05-22 NOTE — TELEPHONE ENCOUNTER
Clarke Morning from [facility] called and states Patient's Jadine Birks will not ship until May 27, 2020. Not able to ship the day after a holiday. FYI Routing to clinical staff.

## 2020-05-26 ENCOUNTER — TELEPHONE (OUTPATIENT)
Dept: OBGYN CLINIC | Age: 30
End: 2020-05-26

## 2020-05-26 NOTE — TELEPHONE ENCOUNTER
Updated shipment date. per Elinor Catching will be delivered to our office on Thursday may 28 th.  Routing as [facility]

## 2020-05-26 NOTE — TELEPHONE ENCOUNTER
donna- patient was scheduled for her maryam injection today with office 5/26/20. She did not show. I tried to call patient and was able to leave a voicemail asking the patient to call office to let us know if she reschedule. Will attempt to contact patient again tomorrow to get this rescheduled.

## 2020-05-31 ENCOUNTER — TELEPHONE (OUTPATIENT)
Dept: OBGYN CLINIC | Age: 30
End: 2020-05-31

## 2020-06-01 ENCOUNTER — ROUTINE PRENATAL (OUTPATIENT)
Dept: OBGYN CLINIC | Age: 30
End: 2020-06-01
Payer: COMMERCIAL

## 2020-06-01 ENCOUNTER — OFFICE VISIT (OUTPATIENT)
Dept: OBGYN CLINIC | Age: 30
End: 2020-06-01
Payer: COMMERCIAL

## 2020-06-01 VITALS
BODY MASS INDEX: 20.2 KG/M2 | HEART RATE: 110 BPM | SYSTOLIC BLOOD PRESSURE: 110 MMHG | WEIGHT: 140.8 LBS | DIASTOLIC BLOOD PRESSURE: 62 MMHG

## 2020-06-01 PROCEDURE — G8420 CALC BMI NORM PARAMETERS: HCPCS | Performed by: OBSTETRICS & GYNECOLOGY

## 2020-06-01 PROCEDURE — 76816 OB US FOLLOW-UP PER FETUS: CPT | Performed by: OBSTETRICS & GYNECOLOGY

## 2020-06-01 PROCEDURE — 99214 OFFICE O/P EST MOD 30 MIN: CPT | Performed by: OBSTETRICS & GYNECOLOGY

## 2020-06-01 PROCEDURE — G8427 DOCREV CUR MEDS BY ELIG CLIN: HCPCS | Performed by: OBSTETRICS & GYNECOLOGY

## 2020-06-01 PROCEDURE — 4004F PT TOBACCO SCREEN RCVD TLK: CPT | Performed by: OBSTETRICS & GYNECOLOGY

## 2020-06-01 RX ORDER — VITAMIN A ACETATE, BETA CAROTENE, ASCORBIC ACID, CHOLECALCIFEROL, .ALPHA.-TOCOPHEROL ACETATE, DL-, THIAMINE MONONITRATE, RIBOFLAVIN, NIACINAMIDE, PYRIDOXINE HYDROCHLORIDE, FOLIC ACID, CYANOCOBALAMIN, CALCIUM CARBONATE, FERROUS FUMARATE, ZINC OXIDE, CUPRIC OXIDE 3080; 12; 120; 400; 1; 1.84; 3; 20; 22; 920; 25; 200; 27; 10; 2 [IU]/1; UG/1; MG/1; [IU]/1; MG/1; MG/1; MG/1; MG/1; MG/1; [IU]/1; MG/1; MG/1; MG/1; MG/1; MG/1
1 TABLET, FILM COATED ORAL DAILY
Qty: 30 TABLET | Refills: 0 | Status: SHIPPED | OUTPATIENT
Start: 2020-06-01 | End: 2020-07-28 | Stop reason: SDUPTHER

## 2020-06-01 NOTE — PROGRESS NOTES
[place] 275 mg/ml indection  LOT: [id number]  EXP 6/2022  NDC: 29413-646-74  Injection: Right arm      Temp: 97.8 f oral    Maternal emotional well being screening form completed and reviewed with patient. Current score is 0. Patient given referral to [address] [place] [address] ([phone]):  No

## 2020-06-09 ENCOUNTER — NURSE ONLY (OUTPATIENT)
Dept: OBGYN CLINIC | Age: 30
End: 2020-06-09
Payer: COMMERCIAL

## 2020-06-09 VITALS
HEART RATE: 98 BPM | DIASTOLIC BLOOD PRESSURE: 60 MMHG | SYSTOLIC BLOOD PRESSURE: 112 MMHG | BODY MASS INDEX: 20.52 KG/M2 | WEIGHT: 143 LBS | TEMPERATURE: 98.4 F

## 2020-06-09 PROCEDURE — 96372 THER/PROPH/DIAG INJ SC/IM: CPT | Performed by: OBSTETRICS & GYNECOLOGY

## 2020-06-09 NOTE — PROGRESS NOTES
[place] 275 mg/1.1 ml    Given in Left deltoid  LOT# [id number]  EXP: 2/2022  NDC: 69554-438-27  Patient tolerated well

## 2020-06-14 ENCOUNTER — TELEPHONE (OUTPATIENT)
Dept: OBGYN CLINIC | Age: 30
End: 2020-06-14

## 2020-06-15 ENCOUNTER — NURSE ONLY (OUTPATIENT)
Dept: OBGYN CLINIC | Age: 30
End: 2020-06-15
Payer: COMMERCIAL

## 2020-06-15 VITALS
SYSTOLIC BLOOD PRESSURE: 110 MMHG | WEIGHT: 142.8 LBS | BODY MASS INDEX: 20.49 KG/M2 | HEART RATE: 88 BPM | TEMPERATURE: 97.2 F | DIASTOLIC BLOOD PRESSURE: 60 MMHG

## 2020-06-15 PROCEDURE — 96372 THER/PROPH/DIAG INJ SC/IM: CPT | Performed by: OBSTETRICS & GYNECOLOGY

## 2020-06-15 NOTE — PROGRESS NOTES
[id number] mg/1.1m      Administered: Right deltoid  LOT: [id number]   EXP 06/2022  NDC: 11976-991-15  Patient tolerated well

## 2020-06-23 ENCOUNTER — TELEPHONE (OUTPATIENT)
Dept: OBGYN CLINIC | Age: 30
End: 2020-06-23

## 2020-06-23 NOTE — TELEPHONE ENCOUNTER
Patient no-showed for maryam injection on 6/22/20 called left voice mail for her to call and reschedule appointment.      Routing to physician and clinical staff

## 2020-06-29 ENCOUNTER — ROUTINE PRENATAL (OUTPATIENT)
Dept: OBGYN CLINIC | Age: 30
End: 2020-06-29
Payer: COMMERCIAL

## 2020-06-29 ENCOUNTER — OFFICE VISIT (OUTPATIENT)
Dept: OBGYN CLINIC | Age: 30
End: 2020-06-29
Payer: COMMERCIAL

## 2020-06-29 VITALS
HEART RATE: 93 BPM | BODY MASS INDEX: 20.81 KG/M2 | SYSTOLIC BLOOD PRESSURE: 125 MMHG | DIASTOLIC BLOOD PRESSURE: 81 MMHG | WEIGHT: 145 LBS

## 2020-06-29 PROCEDURE — 76816 OB US FOLLOW-UP PER FETUS: CPT | Performed by: OBSTETRICS & GYNECOLOGY

## 2020-06-29 PROCEDURE — 99214 OFFICE O/P EST MOD 30 MIN: CPT | Performed by: OBSTETRICS & GYNECOLOGY

## 2020-06-29 PROCEDURE — G8427 DOCREV CUR MEDS BY ELIG CLIN: HCPCS | Performed by: OBSTETRICS & GYNECOLOGY

## 2020-06-29 PROCEDURE — 4004F PT TOBACCO SCREEN RCVD TLK: CPT | Performed by: OBSTETRICS & GYNECOLOGY

## 2020-06-29 PROCEDURE — G8420 CALC BMI NORM PARAMETERS: HCPCS | Performed by: OBSTETRICS & GYNECOLOGY

## 2020-06-29 RX ORDER — HYDROXYPROGESTERONE CAPROATE 250 MG/ML
250 INJECTION INTRAMUSCULAR
Qty: 1 VIAL | Refills: 3 | Status: SHIPPED | OUTPATIENT
Start: 2020-06-29 | End: 2020-08-26 | Stop reason: ALTCHOICE

## 2020-06-29 ASSESSMENT — PATIENT HEALTH QUESTIONNAIRE - PHQ9
SUM OF ALL RESPONSES TO PHQ9 QUESTIONS 1 & 2: 0
SUM OF ALL RESPONSES TO PHQ QUESTIONS 1-9: 0
1. LITTLE INTEREST OR PLEASURE IN DOING THINGS: 0
SUM OF ALL RESPONSES TO PHQ QUESTIONS 1-9: 0
2. FEELING DOWN, DEPRESSED OR HOPELESS: 0

## 2020-06-30 LAB — GLUCOSE CHALLENGE: 101 MG/DL

## 2020-07-13 ENCOUNTER — TELEPHONE (OUTPATIENT)
Dept: OBGYN CLINIC | Age: 30
End: 2020-07-13

## 2020-07-14 ENCOUNTER — ROUTINE PRENATAL (OUTPATIENT)
Dept: OBGYN CLINIC | Age: 30
End: 2020-07-14
Payer: COMMERCIAL

## 2020-07-14 VITALS
DIASTOLIC BLOOD PRESSURE: 64 MMHG | WEIGHT: 147.6 LBS | BODY MASS INDEX: 21.18 KG/M2 | HEART RATE: 87 BPM | SYSTOLIC BLOOD PRESSURE: 128 MMHG

## 2020-07-14 PROCEDURE — 36415 COLL VENOUS BLD VENIPUNCTURE: CPT | Performed by: OBSTETRICS & GYNECOLOGY

## 2020-07-14 PROCEDURE — G8420 CALC BMI NORM PARAMETERS: HCPCS | Performed by: OBSTETRICS & GYNECOLOGY

## 2020-07-14 PROCEDURE — G8427 DOCREV CUR MEDS BY ELIG CLIN: HCPCS | Performed by: OBSTETRICS & GYNECOLOGY

## 2020-07-14 PROCEDURE — 99214 OFFICE O/P EST MOD 30 MIN: CPT | Performed by: OBSTETRICS & GYNECOLOGY

## 2020-07-14 PROCEDURE — 4004F PT TOBACCO SCREEN RCVD TLK: CPT | Performed by: OBSTETRICS & GYNECOLOGY

## 2020-07-14 NOTE — PROGRESS NOTES
Blood draw from L Upper Arm x 1 attempt without difficulty. 1 LAV tube drawn. Patient tolerated well.  Darrell OLEA

## 2020-07-14 NOTE — PROGRESS NOTES
Temp:97.9f oral  Maternal emotional well being screening form completed and reviewed with patient. Current score is 0. Patient given referral to [address] [place] [address] ([phone]):  No

## 2020-07-14 NOTE — PROGRESS NOTES
31 y.o. [id number] at 29w6d EGA Estimated Date of Delivery: 20 here for LISE:     Pt seen and examined. No concerns/complaints. Denies VB, LOF, CTX +FM. MWQ reviewed (score: 0)  28 wk labs reviewed -- need CBC (lab error) but GCT normal.   Continues getting weekly maryam injections -- no concerns / complaints. Objective:  /64   Pulse 87   Wt 147 lb 9.6 oz (67 kg)   LMP 2019   BMI 21.18 kg/m²   Gen: AO, NAD  Abd: Soft, NT, gravid     bpm    FH 27 cm   Ext: Mild LE edema  OMM: Increased lumbar lordosis    Prenatal Flow:   - Gene / Carrier / AFP: declined   - Anatomy: 20 fundal placenta, no previa. CL wnl. Amniotic Band in DEBORAH - no limbs involved. SubOpt - spine/RVOT/Profile/AA. Left echogenic focus.   - RPT 20 CL wnl. RPT 20 SubOpt profile. RPT 20 CL wnl. Persistent amniotic band. - Flu: NA   - PNL: A+/ab(-), RI, HepBnr, HepCnr, HIVnr, RPRnr, VI, TSH 1.10, Hgb 13.0, Plt 155, UDS THC, UCxneg, GCCTneg, (+) Trich, Pap ASCUS/ HPV OT+   - 28 wk:  / Hgb 10.5 Plt 180  - Tdap: NEEDs  - GBS:   - COVID:     Assessment/Plan:   Diagnosis Orders   1. Prenatal care, subsequent pregnancy in third trimester  CBC Auto Differential   2. Amniotic band in third trimester, single or unspecified fetus     3. History of  delivery, currently pregnant  HYDROXYprogesterone caproate injection 275 mg   4. Hx of  section     5. Anemia during pregnancy     6. History of heroin abuse ([address]     7. Echogenic focus of heart of fetus affecting antepartum care of mother (RESOLVED)     8. ASCUS with positive high risk HPV cervical     9. Maternal tobacco use in third trimester     10. Trichomonal vaginitis during pregnancy in first trimester     11.  Marijuana use       - reassuring fetal / maternal wellbeing   - plan for growth US / recheck amniotic band around   - cont weekly Maryam injections until 36wks  - planning for RLTCS at 39wk -- needs scheduled   - needs TDAP at next visit  - return OB and COVID precautions reviewed     Follow Up  Return in about 2 weeks (around 7/28/2020) for Return OB visit.     Zachariah Negrete DO

## 2020-07-15 LAB
BASOPHILS ABSOLUTE: 0.1 K/UL (ref 0–0.2)
BASOPHILS RELATIVE PERCENT: 0.6 %
EOSINOPHILS ABSOLUTE: 0.2 K/UL (ref 0–0.6)
EOSINOPHILS RELATIVE PERCENT: 2 %
HCT VFR BLD CALC: 31.1 % (ref 36–48)
HEMOGLOBIN: 10.5 G/DL (ref 12–16)
LYMPHOCYTES ABSOLUTE: 2 K/UL (ref 1–5.1)
LYMPHOCYTES RELATIVE PERCENT: 20.9 %
MCH RBC QN AUTO: 32.2 PG (ref 26–34)
MCHC RBC AUTO-ENTMCNC: 33.9 G/DL (ref 31–36)
MCV RBC AUTO: 95.1 FL (ref 80–100)
MONOCYTES ABSOLUTE: 0.5 K/UL (ref 0–1.3)
MONOCYTES RELATIVE PERCENT: 4.8 %
NEUTROPHILS ABSOLUTE: 6.8 K/UL (ref 1.7–7.7)
NEUTROPHILS RELATIVE PERCENT: 71.7 %
PDW BLD-RTO: 13.4 % (ref 12.4–15.4)
PLATELET # BLD: 180 K/UL (ref 135–450)
PMV BLD AUTO: 9.9 FL (ref 5–10.5)
RBC # BLD: 3.28 M/UL (ref 4–5.2)
WBC # BLD: 9.4 K/UL (ref 4–11)

## 2020-07-17 RX ORDER — FERROUS SULFATE 325(65) MG
325 TABLET ORAL 2 TIMES DAILY
Qty: 60 TABLET | Refills: 5 | Status: CANCELLED | OUTPATIENT
Start: 2020-07-17

## 2020-07-17 RX ORDER — DOCUSATE SODIUM 100 MG/1
100 CAPSULE, LIQUID FILLED ORAL 2 TIMES DAILY
Qty: 60 CAPSULE | Refills: 0 | Status: ON HOLD | OUTPATIENT
Start: 2020-07-17 | End: 2020-08-08 | Stop reason: ALTCHOICE

## 2020-07-17 RX ORDER — DOCUSATE SODIUM 100 MG/1
100 CAPSULE, LIQUID FILLED ORAL 2 TIMES DAILY
Qty: 60 CAPSULE | Refills: 0 | Status: CANCELLED | OUTPATIENT
Start: 2020-07-17 | End: 2020-08-16

## 2020-07-17 RX ORDER — FERROUS SULFATE 325(65) MG
325 TABLET ORAL
Qty: 180 TABLET | Refills: 1 | Status: ON HOLD | OUTPATIENT
Start: 2020-07-17 | End: 2020-08-08 | Stop reason: ALTCHOICE

## 2020-07-27 ENCOUNTER — TELEPHONE (OUTPATIENT)
Dept: OBGYN CLINIC | Age: 30
End: 2020-07-27

## 2020-07-28 ENCOUNTER — ROUTINE PRENATAL (OUTPATIENT)
Dept: OBGYN CLINIC | Age: 30
End: 2020-07-28
Payer: COMMERCIAL

## 2020-07-28 ENCOUNTER — OFFICE VISIT (OUTPATIENT)
Dept: OBGYN CLINIC | Age: 30
End: 2020-07-28
Payer: COMMERCIAL

## 2020-07-28 VITALS
HEART RATE: 114 BPM | DIASTOLIC BLOOD PRESSURE: 70 MMHG | BODY MASS INDEX: 21.24 KG/M2 | SYSTOLIC BLOOD PRESSURE: 120 MMHG | WEIGHT: 148 LBS

## 2020-07-28 PROCEDURE — G8420 CALC BMI NORM PARAMETERS: HCPCS | Performed by: OBSTETRICS & GYNECOLOGY

## 2020-07-28 PROCEDURE — 76816 OB US FOLLOW-UP PER FETUS: CPT | Performed by: OBSTETRICS & GYNECOLOGY

## 2020-07-28 PROCEDURE — G8427 DOCREV CUR MEDS BY ELIG CLIN: HCPCS | Performed by: OBSTETRICS & GYNECOLOGY

## 2020-07-28 PROCEDURE — 4004F PT TOBACCO SCREEN RCVD TLK: CPT | Performed by: OBSTETRICS & GYNECOLOGY

## 2020-07-28 PROCEDURE — 99214 OFFICE O/P EST MOD 30 MIN: CPT | Performed by: OBSTETRICS & GYNECOLOGY

## 2020-07-28 RX ORDER — VITAMIN A ACETATE, BETA CAROTENE, ASCORBIC ACID, CHOLECALCIFEROL, .ALPHA.-TOCOPHEROL ACETATE, DL-, THIAMINE MONONITRATE, RIBOFLAVIN, NIACINAMIDE, PYRIDOXINE HYDROCHLORIDE, FOLIC ACID, CYANOCOBALAMIN, CALCIUM CARBONATE, FERROUS FUMARATE, ZINC OXIDE, CUPRIC OXIDE 3080; 12; 120; 400; 1; 1.84; 3; 20; 22; 920; 25; 200; 27; 10; 2 [IU]/1; UG/1; MG/1; [IU]/1; MG/1; MG/1; MG/1; MG/1; MG/1; [IU]/1; MG/1; MG/1; MG/1; MG/1; MG/1
1 TABLET, FILM COATED ORAL DAILY
Qty: 30 TABLET | Refills: 3 | Status: SHIPPED | OUTPATIENT
Start: 2020-07-28

## 2020-07-28 NOTE — PROGRESS NOTES
10:08 AM Given 1 mL [place] (hydroxyprogesterone caproate) 250 mg/mL IM    Site:Left arm. Lot #[id number]  Expiration Date: 12/2021  [facility] #[id number]. Patient tolerated well. No reaction noted. RTO in 1 week for next injection. Administered by:  Darrell OLEA
31 y.o. [id number] at [address] [address] Estimated Date of Delivery: 9/23/20 here for LISE:     Pt seen and examined. No concerns/complaints. Denies VB, LOF, CTX +FM. MWQ reviewed (score: 0). Continues getting weekly maryam injections -- no concerns / complaints. Wants refill on prenatal vitamin with iron -- sent to pharmacy. Objective:  /70   Pulse 114   Wt 148 lb (67.1 kg)   LMP 12/18/2019   BMI 21.24 kg/m²   Gen: AO, NAD  Abd: Soft, NT, gravid     bpm    FH 30 cm   Ext: Mild LE edema  OMM: Increased lumbar lordosis    Prenatal Flow:   - Gene / Padmini Borer / AFP: declined   - Anatomy: 5/6/20 fundal placenta, no previa. CL wnl. Amniotic Band in [place]AH - no limbs involved. SubOpt - spine/RVOT/Profile/AA. Left echogenic focus.   - RPT 5/18/20 CL wnl. RPT 6/1/20 SubOpt profile. RPT 6/1/20 CL wnl. Persistent amniotic band. - Flu: NA   - PNL: A+/ab(-), RI, HepBnr, HepCnr, HIVnr, RPRnr, VI, TSH 1.10, Hgb 13.0, Plt 155, UDS THC, UCxneg, GCCTneg, (+) Trich, Pap ASCUS/ HPV OT+   - 28 wk:  / Hgb 10.5 Plt 180  - Tdap: 7/28/20  - GBS:   - COVID:     Images:  OBSTETRICAL ULTRASOUND GROWTH    DATE: 7/28/20    PHYSICIAN: LEATHA Virgen.     SONOGRAPHER: ROSALIO Cruz [facility]    INDICATION: Growth, amniotic band, echogenic focus    COMPARISON: 6/29/20    TYPE OF SCAN: abdominal    FINDINGS:  A single viable intrauterine pregnancy is noted in cephalic presentation. Cardiac and somatic activity are noted. The following values were obtained:   Fetal heart rate    138bpm   BPD      8.09cm  60.6 %   Head Circumference    29.59cm 34.9 %    Abdominal Circumference   27.93cm 52.0 %   Femur Length     6.62cm  89.6 %   Humerus Length    5.30cm  24.0 %   Amniotic fluid index    9.12cm   EFW      2038g  67.2 percentile    Amniotic fluid volume is normal. Based on sonographic criteria the estimated fetal age is 32weeks and 3days with EDC of 9/19/20. There is a 4 day discordance with the established EDC of 9/23/20.      The
12-Mar-2018 21:50

## 2020-08-08 ENCOUNTER — APPOINTMENT (OUTPATIENT)
Dept: GENERAL RADIOLOGY | Age: 30
End: 2020-08-08
Payer: COMMERCIAL

## 2020-08-08 ENCOUNTER — HOSPITAL ENCOUNTER (OUTPATIENT)
Age: 30
Discharge: HOME | End: 2020-08-08
Attending: OBSTETRICS & GYNECOLOGY | Admitting: OBSTETRICS & GYNECOLOGY
Payer: COMMERCIAL

## 2020-08-08 VITALS
HEART RATE: 86 BPM | HEIGHT: 69 IN | RESPIRATION RATE: 16 BRPM | DIASTOLIC BLOOD PRESSURE: 75 MMHG | SYSTOLIC BLOOD PRESSURE: 133 MMHG | BODY MASS INDEX: 21.92 KG/M2 | TEMPERATURE: 98.4 F | WEIGHT: 148 LBS

## 2020-08-08 PROBLEM — O99.013 ANEMIA DURING PREGNANCY IN THIRD TRIMESTER: Status: ACTIVE | Noted: 2020-08-08

## 2020-08-08 PROCEDURE — 6370000000 HC RX 637 (ALT 250 FOR IP): Performed by: OBSTETRICS & GYNECOLOGY

## 2020-08-08 PROCEDURE — 99211 OFF/OP EST MAY X REQ PHY/QHP: CPT

## 2020-08-08 PROCEDURE — 71046 X-RAY EXAM CHEST 2 VIEWS: CPT

## 2020-08-08 PROCEDURE — 99236 HOSP IP/OBS SAME DATE HI 85: CPT | Performed by: OBSTETRICS & GYNECOLOGY

## 2020-08-08 RX ORDER — ACETAMINOPHEN 325 MG/1
650 TABLET ORAL EVERY 4 HOURS PRN
Status: DISCONTINUED | OUTPATIENT
Start: 2020-08-08 | End: 2020-08-08 | Stop reason: HOSPADM

## 2020-08-08 RX ORDER — ACETAMINOPHEN 325 MG/1
TABLET ORAL
Status: DISCONTINUED
Start: 2020-08-08 | End: 2020-08-08 | Stop reason: HOSPADM

## 2020-08-08 RX ADMIN — ACETAMINOPHEN 650 MG: 325 TABLET ORAL at 14:29

## 2020-08-08 ASSESSMENT — ENCOUNTER SYMPTOMS
DIARRHEA: 0
ABDOMINAL DISTENTION: 0
VOMITING: 0
NAUSEA: 0
SHORTNESS OF BREATH: 0
CONSTIPATION: 0
ABDOMINAL PAIN: 0
BACK PAIN: 1

## 2020-08-08 ASSESSMENT — PAIN SCALES - GENERAL: PAINLEVEL_OUTOF10: 9

## 2020-08-08 NOTE — FLOWSHEET NOTE
Patient arrives in triage with report of having fallen down several stairs today. Landed on buttocks and back. Complains of pain in upper buttock and ribs on left side. States that she did not hit abdomen. Has felt fetal movement X1 since fall.

## 2020-08-08 NOTE — PROGRESS NOTES
Patient discharged home at this time. Patient left unit undelivered, not in active labor, ambulatory in stable condition. Discharge instructions reviewed with patient, denies questions and verbalizes understanding.

## 2020-08-08 NOTE — H&P
Department of Obstetrics and Gynecology  Labor and Delivery  Attending Triage Note      SUBJECTIVE:  30 y/o  female at 33 weeks 3 days gestation with [facility] 2020 presents for evaluation secondary to fall at home. States she was descending down the flight of stairs in her home and slipped on th 2nd to last stair (wood floors). Landed on left side/back--buttocks and back. States she landed on the edge of the stair with her low back. Admits to persistent pain. Denies vaginal bleeding, loss of fluid, contractions, and decreased fetal movement. Pregnancy has been complicated by history of , 1st trimester subchorionic hemorrhage, THC use, tobacco use, subutex maintenance (history of heroin abuse), history of  labor (34 weeks), history of LEEP procedure, presence of amniotic band and presence of echogenic focus. Review of Systems   Constitutional: Negative. Negative for activity change, appetite change, chills, fatigue, fever and unexpected weight change. Respiratory: Negative for shortness of breath. Cardiovascular: Negative for chest pain. Gastrointestinal: Negative for abdominal distention, abdominal pain, constipation, diarrhea, nausea and vomiting. Genitourinary: Negative for difficulty urinating, dysuria, hematuria, vaginal bleeding, vaginal discharge and vaginal pain. Musculoskeletal: Positive for back pain. Psychiatric/Behavioral: Negative. A positive blood type. No Known Allergies  No current facility-administered medications on file prior to encounter. Current Outpatient Medications on File Prior to Encounter   Medication Sig Dispense Refill    Prenatal Vit-Fe Fumarate-FA (PRENATAL PLUS) 27-1 MG TABS Take 1 tablet by mouth daily 30 tablet 3    HYDROXYprogesterone caproate 250 MG/ML OIL oil injection Inject 1 mL into the muscle every 7 days 1 vial 3    gabapentin (NEURONTIN) 100 MG capsule Take 100 mg by mouth daily.         Past Medical History: Diagnosis Date    Abnormal Pap smear of cervix     Depression     Heroin abuse (HCC)     Scarlet fever     Strep throat      Past Surgical History:   Procedure Laterality Date     SECTION      DILATION AND CURETTAGE      TONSILLECTOMY AND ADENOIDECTOMY       Social History     Socioeconomic History    Marital status: Single     Spouse name: Not on file    Number of children: Not on file    Years of education: Not on file    Highest education level: Not on file   Occupational History    Not on file   Social Needs    Financial resource strain: Not on file    Food insecurity     Worry: Not on file     Inability: Not on file    Transportation needs     Medical: Not on file     Non-medical: Not on file   Tobacco Use    Smoking status: Current Every Day Smoker     Packs/day: 0.50     Years: 15.00     Pack years: 7.50     Types: Cigarettes    Smokeless tobacco: Never Used   Substance and Sexual Activity    Alcohol use: No    Drug use: Yes     Types: Marijuana     Comment: prio heroin, tring to wean off    Sexual activity: Yes     Partners: Male   Lifestyle    Physical activity     Days per week: Not on file     Minutes per session: Not on file    Stress: Not on file   Relationships    Social connections     Talks on phone: Not on file     Gets together: Not on file     Attends [employer] service: Not on file     Active member of club or organization: Not on file     Attends meetings of clubs or organizations: Not on file     Relationship status: Not on file    Intimate partner violence     Fear of current or ex partner: Not on file     Emotionally abused: Not on file     Physically abused: Not on file     Forced sexual activity: Not on file   Other Topics Concern    Not on file   Social History Narrative    Not on file     Family History   Problem Relation Age of Onset    Cancer Maternal Grandmother      OB History    Para Term  AB Living   4 3 2 1   3   SAB TAB Ectopic Molar Multiple Live Births             3      # Outcome Date GA Lbr Trey/2nd Weight Sex Delivery Anes PTL Lv   4 Current            3 Term 10/31/11 40w0d  6 lb 1 oz (2.75 kg) M CS-Unspec  N DOLORES      Complications: Breech presentation at birth   2  08 32w0d  4 lb 6 oz (1.984 kg) M Vag-Spont OTHER Y DOLORES   1 Term 07 38w0d  6 lb 10 oz (3.005 kg) F Vag-Spont EPI N DOLORES         OBJECTIVE    Vitals:  /75   Pulse 86   Temp 98.4 °F (36.9 °C) (Oral)   Resp 16   Ht 5' 9\" (1.753 m)   Wt 148 lb (67.1 kg)   LMP 2019   BMI 21.86 kg/m²     CONSTITUTIONAL:  awake, alert, cooperative, no apparent distress, and appears stated age  BACK:  symmetric, paraspinous muscles are tender on palpation left, range of motion normal and swelling and discoloration noted left lower back--T10-L1  LUNGS:  Discomfort with deep breaths, good air exchange and no retractions  CARDIOVASCULAR:  normal S1 and S2  ABDOMEN:  soft, non-distended and non-tender  MUSCULOSKELETAL:  full range of motion noted  NEUROLOGIC:  Mental Status Exam:  Level of Alertness:   awake  Orientation:   person, place, time  Memory:   normal  Fund of Knowledge:  normal  Attention/Concentration:  normal  Language:  normal  SKIN:  Left back contusion--discoloration and swelling    Cervix:  deferred             Fetal heart rate:         Baseline Heart Rate:  140        Accelerations:  present       Decelerations:  absent       Variability:  moderate    Contraction frequency: rare contractions  EXAMINATION:    TWO XRAY VIEWS OF THE CHEST         2020 4:07 pm         COMPARISON:    None.         HISTORY:    ORDERING SYSTEM PROVIDED HISTORY: trauma    Reason for Exam: fell down some steps and has rib pain now         FINDINGS:    The lungs are without acute focal process.  No effusion or pneumothorax.  The    cardiomediastinal silhouette is normal.  The osseous structures are intact    without acute process.              Impression    Negative chest.              ASSESSMENT & PLAN:    1. S/P Fall/trauma with contusion to left posterior flank  2. IUP at 33 weeks 3 days gestation   3. History of --desires TOLAC  4. 1st trimester subchorionic hemorrhage  5. THC use, tobacco use  6. Subutex maintenance (history of heroin abuse)  7. History of  labor (34 weeks)  8. History of LEEP procedure  9. Presence of amniotic band and presence of echogenic focus    Continuous fetal monitoring with toco x 4 hours. If absent or minimal uterine activity and reassuring fetal monitoring, home with abruption precautions, labor precautions, bleeding precautions and pain precautions   Hydration  Limited activity for the next 24 hours.

## 2020-08-08 NOTE — PROGRESS NOTES
Dr. Shepherd Qualia at bedside. FHT/ctx pattern reviewed. XR results discussed with pt. Will monitor EFM for another 2 hours with plan to send home.

## 2020-08-10 ENCOUNTER — TELEPHONE (OUTPATIENT)
Dept: OBGYN CLINIC | Age: 30
End: 2020-08-10

## 2020-08-11 ENCOUNTER — ROUTINE PRENATAL (OUTPATIENT)
Dept: OBGYN CLINIC | Age: 30
End: 2020-08-11
Payer: COMMERCIAL

## 2020-08-11 VITALS
HEART RATE: 94 BPM | SYSTOLIC BLOOD PRESSURE: 130 MMHG | WEIGHT: 161 LBS | BODY MASS INDEX: 23.78 KG/M2 | DIASTOLIC BLOOD PRESSURE: 70 MMHG

## 2020-08-11 PROCEDURE — 4004F PT TOBACCO SCREEN RCVD TLK: CPT | Performed by: OBSTETRICS & GYNECOLOGY

## 2020-08-11 PROCEDURE — 99213 OFFICE O/P EST LOW 20 MIN: CPT | Performed by: OBSTETRICS & GYNECOLOGY

## 2020-08-11 PROCEDURE — G8427 DOCREV CUR MEDS BY ELIG CLIN: HCPCS | Performed by: OBSTETRICS & GYNECOLOGY

## 2020-08-11 PROCEDURE — G8420 CALC BMI NORM PARAMETERS: HCPCS | Performed by: OBSTETRICS & GYNECOLOGY

## 2020-08-11 RX ORDER — CYCLOBENZAPRINE HCL 5 MG
5 TABLET ORAL 2 TIMES DAILY PRN
Qty: 15 TABLET | Refills: 0 | Status: SHIPPED | OUTPATIENT
Start: 2020-08-11 | End: 2020-08-26 | Stop reason: SDUPTHER

## 2020-08-11 NOTE — PROGRESS NOTES
Chante injection administered subcutaneously in pt's right arm. She tolerated well, no complaints. Pt advised to return next week for another [place] injection.

## 2020-08-11 NOTE — PROGRESS NOTES
31 y.o. [id number] at [facility] 6626 Estimated Date of Delivery: 20 here for LISE:     Pt seen and examined. No concerns/complaints. Denies VB, LOF, CTX +FM. MWQ reviewed (score: 0). Did have a fall this weekend (on her back, down stairs) -- was seen in triage and after reassuring assessment was DC home. Admits to lingering back and rib pain. Recommend heat packs / tylenol -- will give Rx for flexeril as needed. Continues getting weekly maryam injections -- no concerns / complaints. Objective:  /70   Pulse 94   Wt 161 lb (73 kg)   LMP 2019   BMI 23.78 kg/m²   Gen: AO, NAD  Abd: Soft, NT, gravid     bpm   FH 33 cm   Ext: Mild LE edema  OMM: Increased lumbar lordosis    Prenatal Flow:   - Gene / Carrier / AFP: declined   - Anatomy: 20 fundal placenta, no previa. CL wnl. Amniotic Band in DEBORAH - no limbs involved. SubOpt - spine/RVOT/Profile/AA. Left echogenic focus.   - RPT 20 CL wnl. RPT 20 SubOpt profile. RPT 20 CL wnl. Persistent amniotic band. - Flu: NA   - PNL: A+/ab(-), RI, HepBnr, HepCnr, HIVnr, RPRnr, VI, TSH 1.10, Hgb 13.0, Plt 155, UDS THC, UCxneg, GCCTneg, (+) Trich, Pap ASCUS/ HPV OT+   - 28 wk:  / Hgb 10.5 Plt 180  - Tdap: 20  - GBS:   - COVID:     Assessment/Plan:   Diagnosis Orders   1. Prenatal care, subsequent pregnancy in third trimester     2. Amniotic band in third trimester, single or unspecified fetus     3. History of  delivery, currently pregnant     4. Hx of  section     5. Anemia during pregnancy     6. History of heroin abuse ([facility] [address]     7. Echogenic focus of heart of fetus affecting antepartum care of mother (RESOLVED)     8. ASCUS with positive high risk HPV cervical     9. Maternal tobacco use in third trimester     10. Trichomonal vaginitis during pregnancy in first trimester     11.  Acute left-sided back pain, unspecified back location  cyclobenzaprine (FLEXERIL) 5 MG tablet     - reassuring fetal / maternal wellbeing   - plan for growth US / recheck amniotic band around 8/28  - cont weekly Chante injections until 36wks  - scheduled for RLTCS on 9/18/20 at 1 pm (39+2 EGA)   - return OB and COVID precautions reviewed     Follow Up  Return in about 2 weeks (around 8/25/2020) for Return OB visit.     Rnady Thompson DO

## 2020-08-11 NOTE — PROGRESS NOTES
Temp. 98.1 F oral      Maternal emotional well being screening form completed and reviewed with patient. Current score is 0.    Patient given referral to [address] [place] [address] ([phone]):  NO.

## 2020-08-26 ENCOUNTER — OFFICE VISIT (OUTPATIENT)
Dept: OBGYN CLINIC | Age: 30
End: 2020-08-26
Payer: COMMERCIAL

## 2020-08-26 ENCOUNTER — ROUTINE PRENATAL (OUTPATIENT)
Dept: OBGYN CLINIC | Age: 30
End: 2020-08-26
Payer: COMMERCIAL

## 2020-08-26 VITALS
SYSTOLIC BLOOD PRESSURE: 140 MMHG | HEART RATE: 107 BPM | DIASTOLIC BLOOD PRESSURE: 82 MMHG | BODY MASS INDEX: 23.33 KG/M2 | WEIGHT: 158 LBS

## 2020-08-26 LAB
A/G RATIO: 1.1 (ref 1.1–2.2)
ABDOMINAL CIRCUMFERENCE: NORMAL
ALBUMIN SERPL-MCNC: 3.7 G/DL (ref 3.4–5)
ALP BLD-CCNC: 106 U/L (ref 40–129)
ALT SERPL-CCNC: 7 U/L (ref 10–40)
ANION GAP SERPL CALCULATED.3IONS-SCNC: 12 MMOL/L (ref 3–16)
AST SERPL-CCNC: 17 U/L (ref 15–37)
BASOPHILS ABSOLUTE: 0.1 K/UL (ref 0–0.2)
BASOPHILS RELATIVE PERCENT: 0.7 %
BILIRUB SERPL-MCNC: 0.4 MG/DL (ref 0–1)
BIPARIETAL DIAMETER: NORMAL
BUN BLDV-MCNC: 9 MG/DL (ref 7–20)
CALCIUM SERPL-MCNC: 9.3 MG/DL (ref 8.3–10.6)
CHLORIDE BLD-SCNC: 106 MMOL/L (ref 99–110)
CO2: 20 MMOL/L (ref 21–32)
CREAT SERPL-MCNC: <0.5 MG/DL (ref 0.6–1.1)
CREATININE URINE: 47.5 MG/DL (ref 28–259)
EOSINOPHILS ABSOLUTE: 0.2 K/UL (ref 0–0.6)
EOSINOPHILS RELATIVE PERCENT: 2.2 %
ESTIMATED FETAL WEIGHT: NORMAL
FEMORAL DIAMETER: NORMAL
GFR AFRICAN AMERICAN: >60
GFR NON-AFRICAN AMERICAN: >60
GLOBULIN: 3.3 G/DL
GLUCOSE BLD-MCNC: 62 MG/DL (ref 70–99)
HC/AC: NORMAL
HCT VFR BLD CALC: 30.3 % (ref 36–48)
HEAD CIRCUMFERENCE: NORMAL
HEMOGLOBIN: 10.6 G/DL (ref 12–16)
LACTATE DEHYDROGENASE: 259 U/L (ref 100–190)
LYMPHOCYTES ABSOLUTE: 2.3 K/UL (ref 1–5.1)
LYMPHOCYTES RELATIVE PERCENT: 23.5 %
MCH RBC QN AUTO: 32.7 PG (ref 26–34)
MCHC RBC AUTO-ENTMCNC: 34.9 G/DL (ref 31–36)
MCV RBC AUTO: 93.7 FL (ref 80–100)
MONOCYTES ABSOLUTE: 0.4 K/UL (ref 0–1.3)
MONOCYTES RELATIVE PERCENT: 4.4 %
NEUTROPHILS ABSOLUTE: 6.7 K/UL (ref 1.7–7.7)
NEUTROPHILS RELATIVE PERCENT: 69.2 %
PDW BLD-RTO: 13.6 % (ref 12.4–15.4)
PLATELET # BLD: 226 K/UL (ref 135–450)
PMV BLD AUTO: 9.6 FL (ref 5–10.5)
POTASSIUM SERPL-SCNC: 4.2 MMOL/L (ref 3.5–5.1)
PROTEIN PROTEIN: 11 MG/DL
PROTEIN/CREAT RATIO: 0.2 MG/DL
RBC # BLD: 3.24 M/UL (ref 4–5.2)
SODIUM BLD-SCNC: 138 MMOL/L (ref 136–145)
TOTAL PROTEIN: 7 G/DL (ref 6.4–8.2)
URIC ACID, SERUM: 4.4 MG/DL (ref 2.6–6)
WBC # BLD: 9.6 K/UL (ref 4–11)

## 2020-08-26 PROCEDURE — 4004F PT TOBACCO SCREEN RCVD TLK: CPT | Performed by: OBSTETRICS & GYNECOLOGY

## 2020-08-26 PROCEDURE — 76816 OB US FOLLOW-UP PER FETUS: CPT | Performed by: OBSTETRICS & GYNECOLOGY

## 2020-08-26 PROCEDURE — 99213 OFFICE O/P EST LOW 20 MIN: CPT | Performed by: OBSTETRICS & GYNECOLOGY

## 2020-08-26 PROCEDURE — 36415 COLL VENOUS BLD VENIPUNCTURE: CPT | Performed by: OBSTETRICS & GYNECOLOGY

## 2020-08-26 PROCEDURE — G8427 DOCREV CUR MEDS BY ELIG CLIN: HCPCS | Performed by: OBSTETRICS & GYNECOLOGY

## 2020-08-26 PROCEDURE — G8420 CALC BMI NORM PARAMETERS: HCPCS | Performed by: OBSTETRICS & GYNECOLOGY

## 2020-08-26 RX ORDER — CYCLOBENZAPRINE HCL 5 MG
5 TABLET ORAL DAILY PRN
Qty: 10 TABLET | Refills: 0 | Status: SHIPPED | OUTPATIENT
Start: 2020-08-26 | End: 2020-09-05

## 2020-08-26 NOTE — PROGRESS NOTES
Return OB Office Visit    CC:   Chief Complaint   Patient presents with    Routine Prenatal Visit       HPI:  Patient seen and examined. Patient is doing well without complaints today. Denies VB, LOF, ctx. +FM. Denies headaches, vision changes, RUQ pain, increased LE edema. Denies chest pain, shortness of breath, fever, chills, nausea, vomiting. Patient is currently taking Flexeril for low back pain, once daily and is doing well. 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 Patient was treated for trichomonas early in pregnancy and reports she is unsure if her partner received treatment and they had unprotected intercourse last week. Denies vaginal discharge or irritation. Hx of  delivery and has been taking 17 (OH)P throughout this pregnancy. Objective:  BP (!) 140/82   Pulse 107   Wt 158 lb (71.7 kg)   LMP 2019   BMI 23.33 kg/m²     Physical Exam  Vitals signs reviewed. Constitutional:       General: She is not in acute distress. Appearance: She is well-developed. HENT:      Head: Normocephalic and atraumatic. Eyes:      Conjunctiva/sclera: Conjunctivae normal.   Cardiovascular:      Rate and Rhythm: Normal rate. Pulmonary:      Effort: Pulmonary effort is normal. No respiratory distress. Abdominal:      General: There is no distension. Palpations: Abdomen is soft. Tenderness: There is no abdominal tenderness. There is no guarding or rebound. Musculoskeletal:         General: No swelling. Skin:     General: Skin is warm and dry. Neurological:      Mental Status: She is alert and oriented to person, place, and time. Psychiatric:         Mood and Affect: Mood normal.         Behavior: Behavior normal.         Thought Content: Thought content normal.         Ultrasound:   OBSTETRICAL ULTRASOUND GROWTH    DATE: 2020    PHYSICIAN: LEATHA Edward D.O.     SONOGRAPHER: ROSALIO [facility]    INDICATION: Growth, evaluate amniotic band    COMPARISON: 07/28/2020    TYPE OF SCAN: abdominal    FINDINGS:  A single viable intrauterine pregnancy is noted in cephalic presentation. Cardiac and somatic activity are noted. The following values were obtained:   Fetal heart rate    127bpm     BPD      9.58cm  99 %   Head Circumference    33.69cm 81.5 %    Abdominal Circumference   31.71cm 49.5 %   Femur Length     6.80cm  19.7 %   Humerus Length    6.11cm  51.6 %     Amniotic fluid index    13.01cm     EFW      2857g  54.7 percentile    Amniotic fluid volume is normal. Based on sonographic criteria the estimated fetal age is 36weeks and 5days with EDC of 09/18/2020. There is a 5 day discordance with the established EDC of 09/23/2020. The patient has a left lateral placenta that is adequate distance in relation to the internal cervical os. The evaluation of the lower uterine segment and cervix reveals normal appearing anatomy. The uterus is unremarkable/gravid. Maternal ovaries and adnexae are not well visualized due to the size of the uterus and patient's gravid state. Anatomy seen includes: heart, stomach, kidneys, bladder    IMPRESSION:  Single live IUP in the third trimester. Lower uterine segment is limited due to fetal head position. No definite amniotic band is appreciated on today's scan. Adequate interval fetal growth. Assessment/Plan:   Dilia Esparza is a 31 y.o. [id number] at 36w0d who presents for routine OB visit    1. Prenatal care, subsequent pregnancy in third trimester     - Patient is doing well today without complaints     - Fetal wellbeing reassuring by US today     - Maternal wellness questionnaire reviewed - no concerns today, score 3     - Labor, decreased FM, VB, LOF precautions reviewed. - GBS today     - Follow-up in 1 week for LISE visit     2.  Elevated BP without diagnosis of hypertension     - BP today 142/82 with repeat 118/68     - Patient is asymptomatic     - Will order HELLP labs and monitor   - CBC Auto Differential   - Comprehensive Metabolic Panel   - Uric Acid   - Lactate Dehydrogenase   - Protein / Creatinine Ratio, Urine     - PIH precautions reviewed    3. Trichomonal vaginitis during pregnancy in first trimester     - Hx of trichomonas treated during first trimester     - Had intercourse with partner that she does not know if he completed treatment last week     - JEN today - will treat based on results   - VAGINAL PATHOGENS PROBE *A    4. Acute left-sided back pain, unspecified back location     - Work related - is doing well with Flexeril daily     - Refill provided   - cyclobenzaprine (FLEXERIL) 5 MG tablet; Take 1 tablet by mouth daily as needed for Muscle spasms (Back pain)  Dispense: 10 tablet; Refill: 0    5. Amniotic band in second trimester, single or unspecified fetus     - Hx of DEBORAH amniotic band     - DEBORAH anatomy poorly visualized today due to fetal positioning, vertex - band possibly visualized anteriorly at the inferior margin of placenta, however not discreetly identified. 6. History of  delivery, currently pregnant     - Has been utilizing 16 (OH)P with this pregnancy     - D/c today    7. Hx of  section     - Prior  x2 with G3 delivery by [facility] at term due to breech presentation     - Patient scheduled for RLTCS 2020, however today reports she would like to consider      - Risks, benefits and alternatives reviewed with the patient. - Patient is a good candidate for  given hx of  x2 prior      - EFW today 54.7% with BPD > 99%, AIMEE 13    8. Anemia during pregnancy in third trimester     - Asymptomatic    9. Maternal tobacco use in third trimester    10. History of heroin abuse ([address]    11. Echogenic focus of heart of fetus affecting antepartum care of mother, single or unspecified fetus    15. ASCUS with positive high risk HPV cervical    13.   screening encounter     - Culture, Strep B Screen, Vaginal/Rectal      Rubye Alanna, DO

## 2020-08-26 NOTE — PROGRESS NOTES
Temp 97.3 Oral F Repeat /84    Maternal emotional well being screening form completed and reviewed with patient. Current score is 3. Patient given referral to [address] [place] [address] ([phone]):  No

## 2020-08-28 ENCOUNTER — TELEPHONE (OUTPATIENT)
Dept: OBGYN CLINIC | Age: 30
End: 2020-08-28

## 2020-08-28 NOTE — TELEPHONE ENCOUNTER
[facility] Hospitalist History and Physical      Patient presents with:  Dyspnea VITO SOB (respiratory)       PCP: Andra Biggs MD      History of Present Illness: Patient is a 67year old male with PMH sig for COPD, current tobacco use (1ppd), presents for eval Screening done lb 9.6 oz (72.8 kg)   SpO2 93%   BMI 21.78 kg/m²   General:  Alert, no distress, appears stated age. Head:  Normocephalic, without obvious abnormality, atraumatic. Eyes:  Sclera anicteric, No conjunctival pallor, EOMs intact.     Nose: Nares normal. Se additional history at this time. FINDINGS:  Lungs appear mildly hyperexpanded suggestive of COPD. Heart size is within normal limits. Pleural spaces appear clear. No focal consolidation. Likely mild basilar atelectasis and fibrotic changes.       CON

## 2020-08-29 LAB — GROUP B STREP CULTURE: NORMAL

## 2020-08-31 ENCOUNTER — ROUTINE PRENATAL (OUTPATIENT)
Dept: OBGYN CLINIC | Age: 30
End: 2020-08-31
Payer: COMMERCIAL

## 2020-08-31 PROBLEM — O99.333 MATERNAL TOBACCO USE IN THIRD TRIMESTER: Status: ACTIVE | Noted: 2020-02-08

## 2020-08-31 PROCEDURE — G8427 DOCREV CUR MEDS BY ELIG CLIN: HCPCS | Performed by: OBSTETRICS & GYNECOLOGY

## 2020-08-31 PROCEDURE — 99213 OFFICE O/P EST LOW 20 MIN: CPT | Performed by: OBSTETRICS & GYNECOLOGY

## 2020-08-31 PROCEDURE — G8420 CALC BMI NORM PARAMETERS: HCPCS | Performed by: OBSTETRICS & GYNECOLOGY

## 2020-08-31 PROCEDURE — 4004F PT TOBACCO SCREEN RCVD TLK: CPT | Performed by: OBSTETRICS & GYNECOLOGY

## 2020-08-31 NOTE — PROGRESS NOTES
Return OB Office Visit    CC:   Chief Complaint   Patient presents with    Routine Prenatal Visit       HPI:  Pt seen and examined. No concerns/complaints. Denies VB, LOF, ctx. +FM. Has some back pain still but flexeril helping. Otherwise no issues. Has stopped Chante injections. Maternal wellness questionnaire reviewed - no concerns today. Score 0. Objective:  BP (!) 140/82   Pulse 102   Wt 161 lb (73 kg)   LMP 2019   BMI 23.78 kg/m²   Gen: AO, NAD  Abd: Soft, NT  FHT: 147  FH: 35cm, vertex by Leopolds  Ext: Mild LE edema  SVE; /2    Assessment/Plan:  31 y.o. [id number] at 36w5d (Estimated Date of Delivery: 20) presents for LISE appointment:      Diagnosis Orders   1. Prenatal care in third trimester     2. Gestational hypertension, third trimester     3. Amniotic band in second trimester, single or unspecified fetus     4. Trichomonal vaginitis during pregnancy in first trimester     5. History of  delivery, currently pregnant     6. Hx of  section     7. History of heroin abuse ([facility] [address]       Doing well today, routine care  - FWB reassuring, labor precautions reviewed  - GBS negative  - Now with dx of gHTN, normotensive on repeat BP today and labs from last week wnl. Given 24hr UP collection to bring to next appointment  - trich negative  - per last US note difficult to visualize amniotic band at this time  - patient desires TOLAC, good candidate given prior  x2 and favorable exam today.  RCD scheduled , however discussed could consider IOL if no spontaneous labor which she is amenable to    Dispo: RTC in 1 week  Les Redmond MD

## 2020-08-31 NOTE — PROGRESS NOTES
Temp:97.2f oral  Maternal emotional well being screening form completed and reviewed with patient. Current score is 0. Patient given referral to [address] [place] [address] ([phone]):  No

## 2020-09-01 VITALS
BODY MASS INDEX: 23.78 KG/M2 | WEIGHT: 161 LBS | DIASTOLIC BLOOD PRESSURE: 84 MMHG | SYSTOLIC BLOOD PRESSURE: 136 MMHG | HEART RATE: 102 BPM

## 2020-09-01 PROBLEM — Z34.93 PRENATAL CARE IN THIRD TRIMESTER: Status: ACTIVE | Noted: 2020-02-08

## 2020-09-01 PROBLEM — O13.3 GESTATIONAL HYPERTENSION, THIRD TRIMESTER: Status: ACTIVE | Noted: 2020-09-01

## 2020-09-09 ENCOUNTER — ROUTINE PRENATAL (OUTPATIENT)
Dept: OBGYN CLINIC | Age: 30
End: 2020-09-09
Payer: COMMERCIAL

## 2020-09-09 VITALS
SYSTOLIC BLOOD PRESSURE: 138 MMHG | WEIGHT: 162 LBS | BODY MASS INDEX: 23.92 KG/M2 | DIASTOLIC BLOOD PRESSURE: 82 MMHG | HEART RATE: 94 BPM

## 2020-09-09 PROCEDURE — 99213 OFFICE O/P EST LOW 20 MIN: CPT | Performed by: OBSTETRICS & GYNECOLOGY

## 2020-09-09 PROCEDURE — 4004F PT TOBACCO SCREEN RCVD TLK: CPT | Performed by: OBSTETRICS & GYNECOLOGY

## 2020-09-09 PROCEDURE — G8420 CALC BMI NORM PARAMETERS: HCPCS | Performed by: OBSTETRICS & GYNECOLOGY

## 2020-09-09 PROCEDURE — G8427 DOCREV CUR MEDS BY ELIG CLIN: HCPCS | Performed by: OBSTETRICS & GYNECOLOGY

## 2020-09-09 NOTE — PROGRESS NOTES
Return OB Office Visit    CC:   Chief Complaint   Patient presents with    Routine Prenatal Visit       HPI:  Patient seen and examined. Patient is doing well without complaints today. Denies VB, LOF, ctx. +FM. Denies headaches, vision changes, RUQ pain, increased LE edema. Denies chest pain, shortness of breath, fever, chills, nausea, vomiting. Objective:  /82   Pulse 94   Wt 162 lb (73.5 kg)   LMP 12/18/2019   BMI 23.92 kg/m²     Physical Exam  Vitals signs reviewed. Constitutional:       General: She is not in acute distress. Appearance: She is well-developed. HENT:      Head: Normocephalic and atraumatic. Eyes:      Conjunctiva/sclera: Conjunctivae normal.   Cardiovascular:      Rate and Rhythm: Normal rate. Pulmonary:      Effort: Pulmonary effort is normal. No respiratory distress. Abdominal:      General: There is no distension. Palpations: Abdomen is soft. Tenderness: There is no abdominal tenderness. There is no guarding or rebound. Musculoskeletal:         General: No swelling. Skin:     General: Skin is warm and dry. Neurological:      Mental Status: She is alert and oriented to person, place, and time. Psychiatric:         Mood and Affect: Mood normal.         Behavior: Behavior normal.         Thought Content: Thought content normal.       FHR: 136 bpm via doppler    Assessment/Plan:   Rama Sweet is a 31 y.o. [id number] at 38w0d who presents for routine OB visit    1. Prenatal care, subsequent pregnancy in third trimester     - Patient is doing well today without complaints     - Fetal wellbeing reassuring by FH and FHR today      - Maternal wellness questionnaire reviewed - no concerns today, score 0     - Labor, decreased FM, VB, LOF precautions reviewed. - GBS negative     - Follow-up in 1 week for LISE visit     2.  Gestational Hypertension     - BP today 138/82     - Patient is asymptomatic     - PIH labs stable, returned 24 hr urine today     - Reviewed based on findings will discuss either earlier induction vs increased frequency of appointments. - PIH precautions reviewed    3. Trichomonal vaginitis during pregnancy in first trimester     - Hx of trichomonas treated during first trimester     - Had intercourse with partner that she does not know if he completed treatment last week     - Negative JEN at 36 weeks    4. Amniotic band in second trimester, single or unspecified fetus     - Hx of DEBORAH amniotic band     - DEBORAH anatomy poorly visualized at 36 weeks due to fetal positioning, vertex - band possibly visualized anteriorly at the inferior margin of placenta, however not discreetly identified. 5. History of  delivery, currently pregnant     - Has been utilizing 16 (OH)P with this pregnancy     - D/C at 36 weeks    6. Hx of  section     - Prior  x2 with G3 delivery by [facility] at term due to breech presentation     - Patient scheduled for RLTCS 2020, will perform José balloon and low dose pitocin at that time pending cervical favorability     - Risks, benefits and alternatives reviewed with the patient. - Patient is a good candidate for  given hx of  x2 prior      - EFW at 36 weeks 54.7% with BPD > 99%, AIMEE 13    7. Anemia during pregnancy in third trimester     - Asymptomatic    8. Maternal tobacco use in third trimester    9. History of heroin abuse ([address]    10. Echogenic focus of heart of fetus affecting antepartum care of mother, single or unspecified fetus    6.  ASCUS with positive high risk HPV cervical      Zack Sow, DO

## 2020-09-09 NOTE — PROGRESS NOTES
Temp. 97.9 F oral      Maternal emotional well being screening form completed and reviewed with patient. Current score is {NUMBer 0.    Patient given referral to [facility] ([phone]):  NO

## 2020-09-11 ENCOUNTER — ANESTHESIA EVENT (OUTPATIENT)
Dept: LABOR AND DELIVERY | Age: 30
DRG: 541 | End: 2020-09-11
Payer: COMMERCIAL

## 2020-09-11 ENCOUNTER — OFFICE VISIT (OUTPATIENT)
Dept: OBGYN CLINIC | Age: 30
End: 2020-09-11
Payer: COMMERCIAL

## 2020-09-11 ENCOUNTER — ROUTINE PRENATAL (OUTPATIENT)
Dept: OBGYN CLINIC | Age: 30
End: 2020-09-11

## 2020-09-11 ENCOUNTER — HOSPITAL ENCOUNTER (INPATIENT)
Age: 30
LOS: 1 days | Discharge: HOME | DRG: 541 | End: 2020-09-13
Attending: OBSTETRICS & GYNECOLOGY | Admitting: OBSTETRICS & GYNECOLOGY
Payer: COMMERCIAL

## 2020-09-11 ENCOUNTER — ANESTHESIA (OUTPATIENT)
Dept: LABOR AND DELIVERY | Age: 30
DRG: 541 | End: 2020-09-11
Payer: COMMERCIAL

## 2020-09-11 VITALS
DIASTOLIC BLOOD PRESSURE: 62 MMHG | WEIGHT: 161.6 LBS | SYSTOLIC BLOOD PRESSURE: 118 MMHG | BODY MASS INDEX: 23.86 KG/M2 | HEART RATE: 91 BPM

## 2020-09-11 LAB
24HR URINE VOLUME (ML): 1700 ML
A/G RATIO: 1.1 (ref 1.1–2.2)
ABO/RH: NORMAL
ALBUMIN SERPL-MCNC: 3.9 G/DL (ref 3.4–5)
ALP BLD-CCNC: 139 U/L (ref 40–129)
ALT SERPL-CCNC: <5 U/L (ref 10–40)
AMPHETAMINE SCREEN, URINE: NORMAL
ANION GAP SERPL CALCULATED.3IONS-SCNC: 13 MMOL/L (ref 3–16)
ANTIBODY SCREEN: NORMAL
AST SERPL-CCNC: 13 U/L (ref 15–37)
BARBITURATE SCREEN URINE: NORMAL
BASOPHILS ABSOLUTE: 0.1 K/UL (ref 0–0.2)
BASOPHILS RELATIVE PERCENT: 0.6 %
BENZODIAZEPINE SCREEN, URINE: NORMAL
BILIRUB SERPL-MCNC: 0.4 MG/DL (ref 0–1)
BUN BLDV-MCNC: 10 MG/DL (ref 7–20)
BUPRENORPHINE URINE: NORMAL
CALCIUM SERPL-MCNC: 8.8 MG/DL (ref 8.3–10.6)
CANNABINOID SCREEN URINE: NORMAL
CHLORIDE BLD-SCNC: 102 MMOL/L (ref 99–110)
CO2: 21 MMOL/L (ref 21–32)
COCAINE METABOLITE SCREEN URINE: NORMAL
CREAT SERPL-MCNC: <0.5 MG/DL (ref 0.6–1.1)
CREATININE 24 HOUR URINE: 2.2 G/24HR (ref 0.6–1.5)
EOSINOPHILS ABSOLUTE: 0.2 K/UL (ref 0–0.6)
EOSINOPHILS RELATIVE PERCENT: 1.7 %
GFR AFRICAN AMERICAN: >60
GFR NON-AFRICAN AMERICAN: >60
GLOBULIN: 3.4 G/DL
GLUCOSE BLD-MCNC: 83 MG/DL (ref 70–99)
HCT VFR BLD CALC: 31.3 % (ref 36–48)
HEMOGLOBIN: 11 G/DL (ref 12–16)
LACTATE DEHYDROGENASE: 189 U/L (ref 100–190)
LYMPHOCYTES ABSOLUTE: 2.4 K/UL (ref 1–5.1)
LYMPHOCYTES RELATIVE PERCENT: 25.1 %
Lab: NORMAL
MCH RBC QN AUTO: 32 PG (ref 26–34)
MCHC RBC AUTO-ENTMCNC: 35.3 G/DL (ref 31–36)
MCV RBC AUTO: 90.8 FL (ref 80–100)
METHADONE SCREEN, URINE: NORMAL
MONOCYTES ABSOLUTE: 0.4 K/UL (ref 0–1.3)
MONOCYTES RELATIVE PERCENT: 4 %
NEUTROPHILS ABSOLUTE: 6.6 K/UL (ref 1.7–7.7)
NEUTROPHILS RELATIVE PERCENT: 68.6 %
OPIATE SCREEN URINE: NORMAL
OXYCODONE URINE: NORMAL
PDW BLD-RTO: 13.3 % (ref 12.4–15.4)
PH UA: 6
PHENCYCLIDINE SCREEN URINE: NORMAL
PLATELET # BLD: 254 K/UL (ref 135–450)
PMV BLD AUTO: 9.5 FL (ref 5–10.5)
POTASSIUM SERPL-SCNC: 3.8 MMOL/L (ref 3.5–5.1)
PROPOXYPHENE SCREEN: NORMAL
PROTEIN 24 HOUR URINE: 0.34 G/24HR
RBC # BLD: 3.44 M/UL (ref 4–5.2)
SARS-COV-2, NAAT: NOT DETECTED
SODIUM BLD-SCNC: 136 MMOL/L (ref 136–145)
TOTAL PROTEIN: 7.3 G/DL (ref 6.4–8.2)
URIC ACID, SERUM: 4.3 MG/DL (ref 2.6–6)
WBC # BLD: 9.6 K/UL (ref 4–11)

## 2020-09-11 PROCEDURE — 6360000002 HC RX W HCPCS: Performed by: OBSTETRICS & GYNECOLOGY

## 2020-09-11 PROCEDURE — 2500000003 HC RX 250 WO HCPCS: Performed by: NURSE ANESTHETIST, CERTIFIED REGISTERED

## 2020-09-11 PROCEDURE — U0002 COVID-19 LAB TEST NON-CDC: HCPCS

## 2020-09-11 PROCEDURE — 86780 TREPONEMA PALLIDUM: CPT

## 2020-09-11 PROCEDURE — 80053 COMPREHEN METABOLIC PANEL: CPT

## 2020-09-11 PROCEDURE — 85025 COMPLETE CBC W/AUTO DIFF WBC: CPT

## 2020-09-11 PROCEDURE — 2580000003 HC RX 258: Performed by: OBSTETRICS & GYNECOLOGY

## 2020-09-11 PROCEDURE — 99024 POSTOP FOLLOW-UP VISIT: CPT | Performed by: OBSTETRICS & GYNECOLOGY

## 2020-09-11 PROCEDURE — 86900 BLOOD TYPING SEROLOGIC ABO: CPT

## 2020-09-11 PROCEDURE — 84550 ASSAY OF BLOOD/URIC ACID: CPT

## 2020-09-11 PROCEDURE — 86850 RBC ANTIBODY SCREEN: CPT

## 2020-09-11 PROCEDURE — 76815 OB US LIMITED FETUS(S): CPT | Performed by: OBSTETRICS & GYNECOLOGY

## 2020-09-11 PROCEDURE — 80307 DRUG TEST PRSMV CHEM ANLYZR: CPT

## 2020-09-11 PROCEDURE — 6360000002 HC RX W HCPCS: Performed by: NURSE ANESTHETIST, CERTIFIED REGISTERED

## 2020-09-11 PROCEDURE — 83615 LACTATE (LD) (LDH) ENZYME: CPT

## 2020-09-11 PROCEDURE — 3700000025 EPIDURAL BLOCK: Performed by: ANESTHESIOLOGY

## 2020-09-11 PROCEDURE — 86901 BLOOD TYPING SEROLOGIC RH(D): CPT

## 2020-09-11 RX ORDER — BUPIVACAINE HYDROCHLORIDE 2.5 MG/ML
INJECTION, SOLUTION EPIDURAL; INFILTRATION; INTRACAUDAL PRN
Status: DISCONTINUED | OUTPATIENT
Start: 2020-09-11 | End: 2020-09-12 | Stop reason: SDUPTHER

## 2020-09-11 RX ORDER — SODIUM CHLORIDE 0.9 % (FLUSH) 0.9 %
10 SYRINGE (ML) INJECTION EVERY 12 HOURS SCHEDULED
Status: DISCONTINUED | OUTPATIENT
Start: 2020-09-11 | End: 2020-09-12

## 2020-09-11 RX ORDER — FENTANYL CITRATE 50 UG/ML
INJECTION, SOLUTION INTRAMUSCULAR; INTRAVENOUS PRN
Status: DISCONTINUED | OUTPATIENT
Start: 2020-09-11 | End: 2020-09-12 | Stop reason: SDUPTHER

## 2020-09-11 RX ORDER — FENTANYL CITRATE 50 UG/ML
INJECTION, SOLUTION INTRAMUSCULAR; INTRAVENOUS
Status: COMPLETED
Start: 2020-09-11 | End: 2020-09-11

## 2020-09-11 RX ORDER — SODIUM CHLORIDE, SODIUM LACTATE, POTASSIUM CHLORIDE, CALCIUM CHLORIDE 600; 310; 30; 20 MG/100ML; MG/100ML; MG/100ML; MG/100ML
INJECTION, SOLUTION INTRAVENOUS CONTINUOUS
Status: DISCONTINUED | OUTPATIENT
Start: 2020-09-11 | End: 2020-09-12

## 2020-09-11 RX ORDER — SODIUM CHLORIDE 0.9 % (FLUSH) 0.9 %
10 SYRINGE (ML) INJECTION PRN
Status: DISCONTINUED | OUTPATIENT
Start: 2020-09-11 | End: 2020-09-12

## 2020-09-11 RX ORDER — SODIUM CHLORIDE, SODIUM LACTATE, POTASSIUM CHLORIDE, CALCIUM CHLORIDE 600; 310; 30; 20 MG/100ML; MG/100ML; MG/100ML; MG/100ML
INJECTION, SOLUTION INTRAVENOUS
Status: DISCONTINUED
Start: 2020-09-11 | End: 2020-09-12

## 2020-09-11 RX ORDER — BUTORPHANOL TARTRATE 1 MG/ML
1 INJECTION, SOLUTION INTRAMUSCULAR; INTRAVENOUS
Status: DISCONTINUED | OUTPATIENT
Start: 2020-09-11 | End: 2020-09-12

## 2020-09-11 RX ORDER — ONDANSETRON 2 MG/ML
4 INJECTION INTRAMUSCULAR; INTRAVENOUS EVERY 6 HOURS PRN
Status: DISCONTINUED | OUTPATIENT
Start: 2020-09-11 | End: 2020-09-12

## 2020-09-11 RX ADMIN — Medication 15 ML/HR: at 22:55

## 2020-09-11 RX ADMIN — Medication 1 MILLI-UNITS/MIN: at 19:42

## 2020-09-11 RX ADMIN — BUPIVACAINE HYDROCHLORIDE 6 ML: 2.5 INJECTION, SOLUTION EPIDURAL; INFILTRATION; INTRACAUDAL; PERINEURAL at 23:39

## 2020-09-11 RX ADMIN — SODIUM CHLORIDE, POTASSIUM CHLORIDE, SODIUM LACTATE AND CALCIUM CHLORIDE: 600; 310; 30; 20 INJECTION, SOLUTION INTRAVENOUS at 17:30

## 2020-09-11 RX ADMIN — SODIUM CHLORIDE, POTASSIUM CHLORIDE, SODIUM LACTATE AND CALCIUM CHLORIDE: 600; 310; 30; 20 INJECTION, SOLUTION INTRAVENOUS at 22:03

## 2020-09-11 RX ADMIN — FENTANYL CITRATE 100 MCG: 50 INJECTION INTRAMUSCULAR; INTRAVENOUS at 23:39

## 2020-09-11 RX ADMIN — BUPIVACAINE HYDROCHLORIDE 7 ML: 2.5 INJECTION, SOLUTION EPIDURAL; INFILTRATION; INTRACAUDAL; PERINEURAL at 22:50

## 2020-09-11 ASSESSMENT — LIFESTYLE VARIABLES: SMOKING_STATUS: 1

## 2020-09-11 ASSESSMENT — PAIN SCALES - GENERAL: PAINLEVEL_OUTOF10: 0

## 2020-09-11 NOTE — PLAN OF CARE
Problem: Anxiety:  Goal: Level of anxiety will decrease  Description: Level of anxiety will decrease  2020 by Dilia Esquivel RN  Outcome: Ongoing  2020 by Zuly Hamm RN  Outcome: Ongoing     Problem: Breathing Pattern - Ineffective:  Goal: Able to breathe comfortably  Description: Able to breathe comfortably  Outcome: Ongoing     Problem: Fluid Volume - Imbalance:  Goal: Absence of imbalanced fluid volume signs and symptoms  Description: Absence of imbalanced fluid volume signs and symptoms  2020 by Dilia Esquivel RN  Outcome: Ongoing  2020 by Zuly Hamm RN  Outcome: Ongoing  Goal: Absence of intrapartum hemorrhage signs and symptoms  Description: Absence of intrapartum hemorrhage signs and symptoms  2020 by Dilia Esquivel RN  Outcome: Ongoing  2020 by Zuly Hamm RN  Outcome: Ongoing     Problem: Infection - Intrapartum Infection:  Goal: Will show no infection signs and symptoms  Description: Will show no infection signs and symptoms  Outcome: Ongoing     Problem: Labor Process - Prolonged:  Goal: Labor progression, first stage, within specified pattern  Description: Labor progression, first stage, within specified pattern  2020 by Dilia Esquivel RN  Outcome: Ongoing  2020 by Zuly Hmam RN  Outcome: Ongoing  Goal: Labor progession, second stage, within specified pattern  Description: Labor progession, second stage, within specified pattern  2020 by Dilia Esquivel RN  Outcome: Ongoing  2020 by Zuly Hamm RN  Outcome: Ongoing  Goal: Uterine contractions within specified parameters  Description: Uterine contractions within specified parameters  Outcome: Ongoing     Problem:  Screening:  Goal: Ability to make informed decisions regarding treatment has improved  Description: Ability to make informed decisions regarding treatment has improved  Outcome: Ongoing     Problem: Pain - Acute:  Goal: Pain level will decrease  Description: Pain level will decrease  9/11/2020 1956 by Richmond Malave RN  Outcome: Ongoing  9/11/2020 1819 by Carlos Figueroa RN  Outcome: Ongoing  Goal: Able to cope with pain  Description: Able to cope with pain  Outcome: Ongoing     Problem: Tissue Perfusion - Uteroplacental, Altered:  Goal: Absence of abnormal fetal heart rate pattern  Description: Absence of abnormal fetal heart rate pattern  Outcome: Ongoing     Problem: Urinary Retention:  Goal: Experiences of bladder distention will decrease  Description: Experiences of bladder distention will decrease  Outcome: Ongoing  Goal: Urinary elimination within specified parameters  Description: Urinary elimination within specified parameters  Outcome: Ongoing

## 2020-09-11 NOTE — H&P
Obstetrics History and Physical    CC:   Chief Complaint   Patient presents with    Hypertension     induction of labor for htn       HPI: Alta Stuart is a 31 y.o. [id number] at 36w4d who presents from the office for newly diagnosed preeclampsia with  testing, BPP 6/10. Patient is feeling well. Denies VB, LOF, regular contractions. +FM. Reports occasional headaches, denies vision changes RUQ pain, increased LE edema. Denies chest pain, shortness of breath, fever, chills, nausea, vomiting. Pregnancy has been complicated by GHTN/preeclampsia, prior  delivery (for breech presentation), trichomonas, amniotic band, hx  delivery (17 (OH)P throughout pregnancy, anemia, tobacco use, echogenic focus, hx heroin abuse. OBGYN Provider : LEATHA Da Silva DO    Obstetrical History:  OB History    Para Term  AB Living   4 3 2 1 0 3   SAB TAB Ectopic Molar Multiple Live Births   0 0 0 0 0 3      # Outcome Date GA Lbr Trey/2nd Weight Sex Delivery Anes PTL Lv   4 Current            3 Term 10/31/11 40w0d  6 lb 1 oz (2.75 kg) M CS-Unspec  N DOLORES      Complications: Breech presentation at birth   2  08 32w0d  4 lb 6 oz (1.984 kg) M Vag-Spont OTHER Y DOLORES      Name: Willie Trevizo   1 Term 07 38w0d  6 lb 10 oz (3.005 kg) F Vag-Spont EPI N DOLORES      Name: Leandro Devlin     Past Medical History:   Past Medical History:   Diagnosis Date    Abnormal Pap smear of cervix     Anemia during pregnancy in third trimester 2020    Depression     Gestational hypertension, third trimester 2020    [facility] abuse ([facility] [address]     Scarlet fever     Strep throat     Traumatic injury during pregnancy, third trimester      Medications:  Prior to Admission medications    Medication Sig Start Date End Date Taking?  Authorizing Provider   Prenatal Vit-Fe Fumarate-FA (PRENATAL PLUS) 27-1 MG TABS Take 1 tablet by mouth daily 20  Yes Nasreen Conley DO   gabapentin (NEURONTIN) 100 MG capsule Take 100 mg by mouth daily. Yes Historical Provider, MD      Allergies:  Patient has no known allergies. Surgical History:  Past Surgical History:   Procedure Laterality Date     SECTION      DILATION AND CURETTAGE      TONSILLECTOMY AND ADENOIDECTOMY       Family History:  Family History   Problem Relation Age of Onset    Cancer Maternal Grandmother      Social History:  Social History     Substance and Sexual Activity   Alcohol Use No     Social History     Substance and Sexual Activity   Drug Use Yes    Types: Marijuana    Comment: prio heroin, tring to wean off     Social History     Tobacco Use   Smoking Status Current Every Day Smoker    Packs/day: 0.50    Years: 15.00    Pack years: 7.50    Types: Cigarettes   Smokeless Tobacco Never Used     Physical Exam:  LMP 2019   General: Alert, well appearing, no acute distress  Head: Normocephalic, atraumatic  Lungs: Clear to auscultation bilaterally without rales, rhonchi, wheezing  CV: Regular rate and regular rhythm, normal S1, S2 without murmurs  Abdomen: Gravid, soft, non-tender, non-distended. No rebound or guarding. No uterine tenderness to palpation  Genitourinary: Normal external genitalia without lesions, physiologic appearing discharge.    Extremities: No significant edema, redness or tenderness  Skin: Well perfused, normal coloration and turgor, no lesions or rashes visualized  Neuro: Alert, oriented, normal speech, no focal deficits, moves extremities appropriately  Psych: Appropriate, normal affect, appears stated age      Baseline: 130  Variability: moderate in degree  Accelerations: Present  Decelerations: Absent                           [place]: Contractions are present irregularly    Category 1    Reactive: Yes      Labs:  Admission on 2020   Component Date Value    WBC 2020 9.6     RBC 2020 3.44*    Hemoglobin 2020 11.0*    Hematocrit 2020 31.3*    MCV 2020 90.8     MCH

## 2020-09-11 NOTE — PROGRESS NOTES
Return OB Office Visit    CC:   Chief Complaint   Patient presents with    Routine Prenatal Visit       HPI:  Patient seen and examined. Patient is doing well today without complaints. Denies VB, LOF, ctx. +FM. Denies headaches, vision changes, RUQ pain, increased LE edema. Denies chest pain, shortness of breath, fever, chills, nausea, vomiting. Objective:  /62   Pulse 91   Wt 161 lb 9.6 oz (73.3 kg)   LMP 12/18/2019   BMI 23.86 kg/m²     Physical Exam  Vitals signs reviewed. Exam conducted with a chaperone present. Constitutional:       General: She is not in acute distress. Appearance: She is well-developed. HENT:      Head: Normocephalic and atraumatic. Eyes:      Conjunctiva/sclera: Conjunctivae normal.   Cardiovascular:      Rate and Rhythm: Normal rate. Pulmonary:      Effort: Pulmonary effort is normal. No respiratory distress. Abdominal:      General: There is no distension. Palpations: Abdomen is soft. Tenderness: There is no abdominal tenderness. There is no guarding or rebound. Genitourinary:     General: Normal vulva. Vagina: No vaginal discharge. Musculoskeletal:         General: No swelling. Skin:     General: Skin is warm and dry. Neurological:      Mental Status: She is alert and oriented to person, place, and time. Deep Tendon Reflexes: Reflexes normal.   Psychiatric:         Mood and Affect: Mood normal.         Behavior: Behavior normal.         Thought Content: Thought content normal.       NST:   Baseline: 130  Variability: moderate in degree  Accelerations: Present - 1 acceleration in 30 minutes  Decelerations: Absent                  Rosa: Contractions are absent    Category 1    Reactive: No      Ultrasound:   OB ULTRASOUND:  BIOPHYSICAL PROFILE    DATE: 9/11/20    PHYSICIAN: LEATHA Paz.    SONOGRAPHER: Bertin Buchanan [facility]    INDICATION: Non reactive NST    TYPE OF SCAN: abdominal    BPP: 6/8  Tone: 2, Gross fetal movement: 2, Breathin, Fluid: 2    FINDINGS:    A single viable intrauterine pregnancy is noted in cephalic presentation. Cardiac and somatic activity are noted. The following values were obtained:   Fetal heart rate 134bpm   Amniotic fluid index 11.94cm    IMPRESSION:   Single live IUP in the third trimester. NST non-reactive. BPP 6/8. AIMEE 11.94cm. Imaging is limited secondary to fetal position. The patient is well aware of the limitations of ultrasound in the detection of anomalies. Assessment/Plan:   Gloria Shrestha is a 31 y.o. [id number] at 38w2d who presents for routine OB visit    1. Prenatal care, subsequent pregnancy in third trimester     - Patient is doing well today without complaints     - BPP 6/8 today     - Maternal wellness questionnaire reviewed - no concerns today     - Labor, decreased FM, VB, LOF precautions reviewed. - GBS negative     - To L&D for IOL      2. Preeclampsia     - Normotensive today     - Patient is asymptomatic     - PIH labs stable     - 24 hr urine 340     -  testing today with BPP 6/10 (2 off for non-reactive NST and 2 off for breathing)     - To L&D for induction of labor     3. Trichomonal vaginitis during pregnancy in first trimester     - Hx of trichomonas treated during first trimester     - Had intercourse with partner that she does not know if he completed treatment last week     - Negative JEN at 36 weeks     4. Amniotic band in second trimester, single or unspecified fetus     - Hx of DEBORAH amniotic band     - DEBORAH anatomy poorly visualized at 36 weeks due to fetal positioning, vertex - band possibly visualized anteriorly at the inferior margin of placenta, however not discreetly identified.      5. History of  delivery, currently pregnant     - Has been utilizing 16 (OH)P with this pregnancy     - D/C at 36 weeks     6.  Hx of  section     - Prior  x2 with G3 delivery by [facility] at term due to breech presentation     - Patient scheduled for RLTCS 2020, will perform José balloon and low dose pitocin at that time pending cervical favorability     - Risks, benefits and alternatives reviewed with the patient. - Patient is a good candidate for  given hx of  x2 prior      - EFW at 36 weeks 54.7% with BPD > 99%, AIMEE 13     7. Anemia during pregnancy in third trimester     - Asymptomatic     8. Maternal tobacco use in third trimester     9. History of heroin abuse ([facility] [address]     10. Echogenic focus of heart of fetus affecting antepartum care of mother, single or unspecified fetus     11.  ASCUS with positive high risk HPV cervical    Fela Means, DO

## 2020-09-11 NOTE — PROGRESS NOTES
Temp: 97.6f oral  Maternal emotional well being screening form completed and reviewed with patient. Current score is 0. Patient given referral to [address] [place] [address] ([phone]):  No

## 2020-09-11 NOTE — PLAN OF CARE
Problem: Anxiety:  Goal: Level of anxiety will decrease  Description: Level of anxiety will decrease  Outcome: Ongoing     Problem: Fluid Volume - Imbalance:  Goal: Absence of imbalanced fluid volume signs and symptoms  Description: Absence of imbalanced fluid volume signs and symptoms  Outcome: Ongoing  Goal: Absence of intrapartum hemorrhage signs and symptoms  Description: Absence of intrapartum hemorrhage signs and symptoms  Outcome: Ongoing     Problem: Labor Process - Prolonged:  Goal: Labor progression, first stage, within specified pattern  Description: Labor progression, first stage, within specified pattern  Outcome: Ongoing  Goal: Labor progession, second stage, within specified pattern  Description: Labor progession, second stage, within specified pattern  Outcome: Ongoing     Problem: Pain - Acute:  Goal: Pain level will decrease  Description: Pain level will decrease  Outcome: Ongoing

## 2020-09-12 LAB — TOTAL SYPHILLIS IGG/IGM: NORMAL

## 2020-09-12 PROCEDURE — 3E033VJ INTRODUCTION OF OTHER HORMONE INTO PERIPHERAL VEIN, PERCUTANEOUS APPROACH: ICD-10-PCS | Performed by: OBSTETRICS & GYNECOLOGY

## 2020-09-12 PROCEDURE — 88307 TISSUE EXAM BY PATHOLOGIST: CPT

## 2020-09-12 PROCEDURE — 7200000001 HC VAGINAL DELIVERY

## 2020-09-12 PROCEDURE — 2580000003 HC RX 258: Performed by: OBSTETRICS & GYNECOLOGY

## 2020-09-12 PROCEDURE — 1220000000 HC SEMI PRIVATE OB R&B

## 2020-09-12 PROCEDURE — 6370000000 HC RX 637 (ALT 250 FOR IP): Performed by: OBSTETRICS & GYNECOLOGY

## 2020-09-12 PROCEDURE — 10907ZC DRAINAGE OF AMNIOTIC FLUID, THERAPEUTIC FROM PRODUCTS OF CONCEPTION, VIA NATURAL OR ARTIFICIAL OPENING: ICD-10-PCS | Performed by: OBSTETRICS & GYNECOLOGY

## 2020-09-12 PROCEDURE — 2500000003 HC RX 250 WO HCPCS: Performed by: NURSE ANESTHETIST, CERTIFIED REGISTERED

## 2020-09-12 PROCEDURE — 10D17Z9 MANUAL EXTRACTION OF PRODUCTS OF CONCEPTION, RETAINED, VIA NATURAL OR ARTIFICIAL OPENING: ICD-10-PCS | Performed by: OBSTETRICS & GYNECOLOGY

## 2020-09-12 PROCEDURE — 0HQ9XZZ REPAIR PERINEUM SKIN, EXTERNAL APPROACH: ICD-10-PCS | Performed by: OBSTETRICS & GYNECOLOGY

## 2020-09-12 PROCEDURE — 6360000002 HC RX W HCPCS: Performed by: OBSTETRICS & GYNECOLOGY

## 2020-09-12 PROCEDURE — 51701 INSERT BLADDER CATHETER: CPT

## 2020-09-12 PROCEDURE — 10D17ZZ EXTRACTION OF PRODUCTS OF CONCEPTION, RETAINED, VIA NATURAL OR ARTIFICIAL OPENING: ICD-10-PCS | Performed by: OBSTETRICS & GYNECOLOGY

## 2020-09-12 PROCEDURE — 6370000000 HC RX 637 (ALT 250 FOR IP)

## 2020-09-12 RX ORDER — HYDROCODONE BITARTRATE AND ACETAMINOPHEN 5; 325 MG/1; MG/1
1 TABLET ORAL EVERY 6 HOURS PRN
Status: DISCONTINUED | OUTPATIENT
Start: 2020-09-12 | End: 2020-09-13 | Stop reason: HOSPADM

## 2020-09-12 RX ORDER — ONDANSETRON 2 MG/ML
4 INJECTION INTRAMUSCULAR; INTRAVENOUS EVERY 6 HOURS PRN
Status: DISCONTINUED | OUTPATIENT
Start: 2020-09-12 | End: 2020-09-13 | Stop reason: HOSPADM

## 2020-09-12 RX ORDER — ACETAMINOPHEN 325 MG/1
650 TABLET ORAL EVERY 4 HOURS PRN
Status: DISCONTINUED | OUTPATIENT
Start: 2020-09-12 | End: 2020-09-13 | Stop reason: HOSPADM

## 2020-09-12 RX ORDER — PRENATAL WITH FERROUS FUM AND FOLIC ACID 3080; 920; 120; 400; 22; 1.84; 3; 20; 10; 1; 12; 200; 27; 25; 2 [IU]/1; [IU]/1; MG/1; [IU]/1; MG/1; MG/1; MG/1; MG/1; MG/1; MG/1; UG/1; MG/1; MG/1; MG/1; MG/1
1 TABLET ORAL DAILY
Status: DISCONTINUED | OUTPATIENT
Start: 2020-09-12 | End: 2020-09-13 | Stop reason: HOSPADM

## 2020-09-12 RX ORDER — LANOLIN 100 %
OINTMENT (GRAM) TOPICAL PRN
Status: DISCONTINUED | OUTPATIENT
Start: 2020-09-12 | End: 2020-09-13 | Stop reason: HOSPADM

## 2020-09-12 RX ORDER — BUPIVACAINE HYDROCHLORIDE 5 MG/ML
INJECTION, SOLUTION EPIDURAL; INTRACAUDAL PRN
Status: DISCONTINUED | OUTPATIENT
Start: 2020-09-12 | End: 2020-09-12 | Stop reason: SDUPTHER

## 2020-09-12 RX ORDER — SODIUM CHLORIDE, SODIUM LACTATE, POTASSIUM CHLORIDE, CALCIUM CHLORIDE 600; 310; 30; 20 MG/100ML; MG/100ML; MG/100ML; MG/100ML
INJECTION, SOLUTION INTRAVENOUS CONTINUOUS
Status: DISCONTINUED | OUTPATIENT
Start: 2020-09-12 | End: 2020-09-13 | Stop reason: HOSPADM

## 2020-09-12 RX ORDER — DOCUSATE SODIUM 100 MG/1
100 CAPSULE, LIQUID FILLED ORAL 2 TIMES DAILY
Status: DISCONTINUED | OUTPATIENT
Start: 2020-09-12 | End: 2020-09-13 | Stop reason: HOSPADM

## 2020-09-12 RX ORDER — LIDOCAINE HYDROCHLORIDE AND EPINEPHRINE BITARTRATE 20; .01 MG/ML; MG/ML
INJECTION, SOLUTION SUBCUTANEOUS PRN
Status: DISCONTINUED | OUTPATIENT
Start: 2020-09-12 | End: 2020-09-12 | Stop reason: SDUPTHER

## 2020-09-12 RX ORDER — IBUPROFEN 800 MG/1
TABLET ORAL
Status: COMPLETED
Start: 2020-09-12 | End: 2020-09-12

## 2020-09-12 RX ORDER — FERROUS SULFATE 325(65) MG
325 TABLET ORAL 2 TIMES DAILY WITH MEALS
Status: DISCONTINUED | OUTPATIENT
Start: 2020-09-12 | End: 2020-09-13 | Stop reason: HOSPADM

## 2020-09-12 RX ORDER — IBUPROFEN 800 MG/1
800 TABLET ORAL EVERY 8 HOURS
Status: DISCONTINUED | OUTPATIENT
Start: 2020-09-12 | End: 2020-09-13 | Stop reason: HOSPADM

## 2020-09-12 RX ORDER — HYDROCODONE BITARTRATE AND ACETAMINOPHEN 5; 325 MG/1; MG/1
2 TABLET ORAL EVERY 6 HOURS PRN
Status: DISCONTINUED | OUTPATIENT
Start: 2020-09-12 | End: 2020-09-13 | Stop reason: HOSPADM

## 2020-09-12 RX ORDER — SODIUM CHLORIDE 0.9 % (FLUSH) 0.9 %
10 SYRINGE (ML) INJECTION PRN
Status: DISCONTINUED | OUTPATIENT
Start: 2020-09-12 | End: 2020-09-13 | Stop reason: HOSPADM

## 2020-09-12 RX ORDER — SODIUM CHLORIDE 0.9 % (FLUSH) 0.9 %
10 SYRINGE (ML) INJECTION EVERY 12 HOURS SCHEDULED
Status: DISCONTINUED | OUTPATIENT
Start: 2020-09-12 | End: 2020-09-13 | Stop reason: HOSPADM

## 2020-09-12 RX ADMIN — BUPIVACAINE HYDROCHLORIDE 3 ML: 5 INJECTION, SOLUTION EPIDURAL; INTRACAUDAL; PERINEURAL at 00:13

## 2020-09-12 RX ADMIN — IBUPROFEN 800 MG: 800 TABLET, FILM COATED ORAL at 01:49

## 2020-09-12 RX ADMIN — DOCUSATE SODIUM 100 MG: 100 CAPSULE, LIQUID FILLED ORAL at 22:30

## 2020-09-12 RX ADMIN — IBUPROFEN 800 MG: 800 TABLET, FILM COATED ORAL at 10:37

## 2020-09-12 RX ADMIN — Medication 999 MILLI-UNITS/MIN: at 00:49

## 2020-09-12 RX ADMIN — Medication 10 ML: at 22:30

## 2020-09-12 RX ADMIN — DOCUSATE SODIUM 100 MG: 100 CAPSULE, LIQUID FILLED ORAL at 10:37

## 2020-09-12 RX ADMIN — ACETAMINOPHEN 650 MG: 325 TABLET ORAL at 22:30

## 2020-09-12 RX ADMIN — CEFAZOLIN SODIUM 2 G: 10 INJECTION, POWDER, FOR SOLUTION INTRAVENOUS at 01:50

## 2020-09-12 RX ADMIN — IBUPROFEN 800 MG: 800 TABLET, FILM COATED ORAL at 18:33

## 2020-09-12 RX ADMIN — LIDOCAINE HYDROCHLORIDE AND EPINEPHRINE 5 ML: 20; 10 INJECTION, SOLUTION INFILTRATION; PERINEURAL at 00:12

## 2020-09-12 RX ADMIN — WITCH HAZEL 40 EACH: 500 SOLUTION RECTAL; TOPICAL at 01:49

## 2020-09-12 RX ADMIN — ACETAMINOPHEN 650 MG: 325 TABLET ORAL at 17:43

## 2020-09-12 RX ADMIN — BENZOCAINE AND LEVOMENTHOL: 200; 5 SPRAY TOPICAL at 01:49

## 2020-09-12 ASSESSMENT — PAIN SCALES - GENERAL
PAINLEVEL_OUTOF10: 3
PAINLEVEL_OUTOF10: 1
PAINLEVEL_OUTOF10: 2
PAINLEVEL_OUTOF10: 1

## 2020-09-12 NOTE — ANESTHESIA PRE PROCEDURE
 Marijuana use F12.90    Subchorionic hemorrhage in first trimester O41.8X10, O46.8X1    Urinary tract infection in mother during first trimester of pregnancy O23.41    Trichomonal vaginitis during pregnancy in first trimester O23.591, A59.01    Suboxone maintenance treatment complicating pregnancy, antepartum, first trimester ([facility] O99.321, F11.20    Hx of  section Z98.891    History of abnormal cervical Pap smear Z87.42    History of loop electrical excision procedure (LEEP) Z98.890    History of  delivery, currently pregnant O09.219    ASCUS with positive high risk HPV cervical R87.610, R87.810    Amniotic band in second trimester O41.8X20    Echogenic focus of heart of fetus affecting antepartum care of mother O35. 8XX0    Anemia during pregnancy in third trimester O99.013    Traumatic injury during pregnancy, third trimester O8A.18    Gestational hypertension, third trimester O13.3       Past Medical History:        Diagnosis Date    Abnormal Pap smear of cervix     Anemia during pregnancy in third trimester 2020    Depression     Fibromyalgia     Gestational hypertension, third trimester 2020    Heroin abuse ([facility]     Scarlet fever     Strep throat     Traumatic injury during pregnancy, third trimester        Past Surgical History:        Procedure Laterality Date     SECTION      DILATION AND CURETTAGE      TONSILLECTOMY AND ADENOIDECTOMY         Social History:    Social History     Tobacco Use    Smoking status: Current Every Day Smoker     Packs/day: 0.50     Years: 15.00     Pack years: 7.50     Types: Cigarettes    Smokeless tobacco: Never Used   Substance Use Topics    Alcohol use:  No                                Ready to quit: Not Answered  Counseling given: Not Answered      Vital Signs (Current):   Vitals:    20 2200 20 2210   BP: 134/76 132/84 130/75    Pulse: 77 74 70    Resp: 16 16 14    Temp: 36.9 °C (98.4 °F)   36.6 °C (97.9 °F)   TempSrc: Oral   Oral   Weight:       Height:                                                  BP Readings from Last 3 Encounters:   09/11/20 130/75   09/11/20 118/62   09/09/20 138/82       NPO Status: Time of last liquid consumption: 1330                        Time of last solid consumption: 1330                        Date of last liquid consumption: 09/11/20                        Date of last solid food consumption: 09/11/20    BMI:   Wt Readings from Last 3 Encounters:   09/11/20 162 lb (73.5 kg)   09/11/20 161 lb 9.6 oz (73.3 kg)   09/09/20 162 lb (73.5 kg)     Body mass index is 23.24 kg/m². CBC:   Lab Results   Component Value Date    WBC 9.6 09/11/2020    RBC 3.44 09/11/2020    HGB 11.0 09/11/2020    HCT 31.3 09/11/2020    MCV 90.8 09/11/2020    RDW 13.3 09/11/2020     09/11/2020       CMP:   Lab Results   Component Value Date     09/11/2020    K 3.8 09/11/2020     09/11/2020    CO2 21 09/11/2020    BUN 10 09/11/2020    CREATININE <0.5 09/11/2020    GFRAA >60 09/11/2020    AGRATIO 1.1 09/11/2020    LABGLOM >60 09/11/2020    GLUCOSE 83 09/11/2020    PROT 7.3 09/11/2020    CALCIUM 8.8 09/11/2020    BILITOT 0.4 09/11/2020    ALKPHOS 139 09/11/2020    AST 13 09/11/2020    ALT <5 09/11/2020       POC Tests: No results for input(s): POCGLU, POCNA, POCK, POCCL, POCBUN, POCHEMO, POCHCT in the last 72 hours.     Coags: No results found for: PROTIME, INR, APTT    HCG (If Applicable):   Lab Results   Component Value Date    PREGTESTUR Negative 11/17/2014        ABGs: No results found for: PHART, PO2ART, WFA9XDI, CUN5WGL, BEART, F1EKQGWZ     Type & Screen (If Applicable):  No results found for: LABABO, LABRH    Drug/Infectious Status (If Applicable):  No results found for: HIV, HEPCAB    COVID-19 Screening (If Applicable):   Lab Results   Component Value Date    COVID19 Not Detected 09/11/2020         Anesthesia Evaluation  Patient summary reviewed and Nursing notes reviewed no history of anesthetic complications:   Airway: Mallampati: II        Dental:          Pulmonary:Negative Pulmonary ROS   (+) current smoker                           Cardiovascular:Negative CV ROS                      Neuro/Psych:   Negative Neuro/Psych ROS               ROS comment: Heroin abuse   GI/Hepatic/Renal: Neg GI/Hepatic/Renal ROS            Endo/Other: Negative Endo/Other ROS                    Abdominal:           Vascular:                                      Anesthesia Plan      epidural     ASA 2 - emergent     (I discussed with the patient the risks and benefits of labor epidural placement. All questions were answered the patient agrees with the plan. Recent Vitals:  ---------------------------               09/11/20                      2210         ---------------------------   BP:                         Pulse:                      Resp:                       Temp:   36.6 °C (97.9 °F)  ---------------------------  Body mass index is 23.24 kg/m².     Social History    Tobacco Use      Smoking status: Current Every Day Smoker        Packs/day: 0.50        Years: 15.00        Pack years: 7.5        Types: Cigarettes      Smokeless tobacco: Never Used      LABS:    CBC  Lab Results       Component                Value               Date/Time                  WBC                      9.6                 09/11/2020 05:30 PM        HGB                      11.0 (L)            09/11/2020 05:30 PM        HCT                      31.3 (L)            09/11/2020 05:30 PM        PLT                      254                 09/11/2020 05:30 PM   RENAL  Lab Results       Component                Value               Date/Time                  NA                       136                 09/11/2020 05:30 PM        K                        3.8                 09/11/2020 05:30 PM        CL                       102                 09/11/2020 05:30 PM        CO2

## 2020-09-12 NOTE — PLAN OF CARE
Problem: VAGINAL DELIVERY - RECOVERY AND POST PARTUM  Goal: Vital signs are medically acceptable  9/12/2020 0817 by Carlos Figueroa RN  Outcome: Ongoing  9/12/2020 0228 by Richmond Malave RN  Outcome: Ongoing  Goal: Patient will remain free of falls  9/12/2020 0817 by Carlos Figueroa RN  Outcome: Ongoing  9/12/2020 0228 by Richmond Malave RN  Outcome: Ongoing  Goal: Fundus firm at midline  9/12/2020 0817 by Carlos Figueroa RN  Outcome: Ongoing  9/12/2020 0228 by Richmond Malave RN  Outcome: Ongoing  Goal: Moderate rubra without clots, no purulent discharge, no foul smelling lochia  9/12/2020 0817 by Carlos Figueroa RN  Outcome: Ongoing  9/12/2020 0228 by Richmond Malave RN  Outcome: Ongoing  Goal: Empties bladder  9/12/2020 0228 by Richmond Malave RN  Outcome: Ongoing  Goal: Verbalizes understanding of normal bowel function resumption  9/12/2020 0228 by Richmond Malave RN  Outcome: Ongoing  Goal: Edema will be absent or minimal  9/12/2020 0228 by Richmond Malave RN  Outcome: Ongoing  Goal: Breasts are soft with nipple integrity intact  9/12/2020 0228 by Richmond Malave RN  Outcome: Ongoing  Goal: Demonstrates appropriate breast feeding techniques  9/12/2020 0228 by Richmond Malave RN  Outcome: Ongoing  Goal: Appropriate behavior observed  9/12/2020 0228 by Richmond Malave RN  Outcome: Ongoing  Goal: Positive Mother-Baby interactions are observed  9/12/2020 0817 by Carlos Figueroa RN  Outcome: Ongoing  9/12/2020 0228 by Richmond Malave RN  Outcome: Ongoing  Goal: Perineum intact without discharge or hematoma  9/12/2020 0228 by Richmond Malave RN  Outcome: Ongoing  Goal: Ambulates independently  9/12/2020 0817 by Carlos Figueroa RN  Outcome: Ongoing  9/12/2020 0228 by Richmond Malave RN  Outcome: Ongoing     Problem: PAIN  Goal: Patient's pain/discomfort is manageable  9/12/2020 0817 by Carlos Figueroa RN  Outcome: Ongoing  9/12/2020 0228 by Richmond Malave

## 2020-09-12 NOTE — LACTATION NOTE
This note was copied from a baby's chart. Lactation Progress Note      Data:    Initial consult during lactation rounds with multip first time breast feeding mother baby bk, who delivered overnight. Pt reports she did not try to breast feed her other 3 children and decided after delivery that she would like to try breast feeding this baby. Action: Introduced self as [address] on for the day and offered much breast feeding support. Breast feeding education reviewed including breast care, expected  feeding behaviors during the first 24-48 hours of life, signs of hunger/satiety, hand expression of colostrum, and how to know baby is getting enough at the breast including appropriate feedings, daily output, and weight trends. Encouraged much STS, offering the breast exclusively, when infant is first beginning to wake and show early signs of hunger and every 3 hours if baby is sleepy and without feeding cues. Encouraged much STS, and hand expression of colostrum when offering the breast. Gave tips to encourage baby to wake for feedings as needed, and gave reassurance that sleepy behavior is common and expected on first DOL as baby recovers from delivery. Discussed what to expect with cluster feeding behaviors, explained normalcy and importance of cluster feeding to bring in and develop a good milk supply. Instructed pt that baby should have a minimum of 8-12 good feedings in a 24 hour period after the first DOL. Educated on risks to breast feeding relationship related to using pacifiers, artificial nipples, and/or formula supplements, and recommended exclusive breastfeeding unless medical indication were to arise. Reviewed importance of deep comfortable latch. Gave tips for good positioning, breast support, and tips to achieve deep latch onto the breast. Reviewed how a good latch should look and feel, and how to break latch if shallow, pinching, or painful.  Instructed that nipple should be rounded when baby releases from the breast without creasing, blanching, or redness. Instructed on inpatient and outpatient lactation support available, how to contact, and lactation hours for this shift. Name and number provided on whiteboard. Encouraged to call for [facility] to assess latch and for f/u support and assistance as needed. Response: Verbalized understanding of teaching provided. Comfortable with breast feeding at this time. Will call for f/u support prn.

## 2020-09-12 NOTE — ANESTHESIA PROCEDURE NOTES
Epidural Block    Patient location during procedure: OB  Reason for block: labor epidural  Staffing  Resident/CRNA: DIANA Blackwell CRNA  Preanesthetic Checklist  Completed: patient identified, pre-op evaluation, timeout performed, IV checked, risks and benefits discussed, monitors and equipment checked, anesthesia consent given, oxygen available and patient being monitored  Epidural  Patient position: sitting  Prep: ChloraPrep and site prepped and draped  Patient monitoring: continuous pulse ox and frequent blood pressure checks  Approach: midline  Location: lumbar (1-5)  Injection technique: LUCERO saline  Provider prep: mask and sterile gloves  Needle  Needle type: Tuohy   Needle gauge: 17 G  Needle length: 3.5 in  Needle insertion depth: 5 cm  Catheter type: side hole  Catheter size: 19 G  Catheter at skin depth: 11 cm  Test dose: negative  Assessment  Hemodynamics: stable  Attempts: 1  Additional Notes  Pt prepped and draped in sterile fashion. Skin wheal with 1% lidocaine. LUCERO with 3 cc NS, 25g spinal needle inserted per malka. Clear CSF visualized in hub. Needle withdrawn and catheter threaded. Negative test dose 3cc 1.5% Lidocaine with Epinephrine. Sterile dressing applied.

## 2020-09-12 NOTE — L&D DELIVERY NOTE
[facility] Obstetrics and Gynecology  Spontaneous Vaginal Delivery Note        Pre-operative Diagnosis:    1. Kamille Sullivan is a 31 y.o. [id number] at 38w3d   2. Preeclampsia  3. Prior  delivery   4. A pos / RI / GBS neg  5. History of  delivery, currently pregnant  6. Anemia during pregnancy in third trimester  7. Maternal tobacco use in third trimester     Post-operative Diagnosis:   1. Kamille Sullivan is a 31 y.o. [id number] at 38w3d   2. Preeclampsia  3. Prior  delivery   4. A pos / RI / GBS neg  5. History of  delivery, currently pregnant  6. Anemia during pregnancy in third trimester  7. Maternal tobacco use in third trimester  8. Retained placenta with need for postpartum D&C             Anesthesia:  Epidural     Procedure:   1.   2. Bedside ultrasound guided curettage     Admission and Delivery:       Patient presented to [address] [place] [address] and Delivery from the office for MIOL secondary to new onset preeclampsia with abnormal  testing. Patient was started on IV pitocin and amniotomy was performed with regular contraction pattern. Patient was given epidural for pain control. Patient progressed spontaneously to complete cervical dilation. Began pushing and with good maternal effort. Infant was delivered in a single contraction. A viable male infant was delivered in the MICHAEL position over intact perineum. Nuchal cord was not present. The shoulders and body were quickly to follow with maternal efforts. Infant was delivered with good tone and spontaneous cry without complication. Mouth and nose were bulb suctioned at maternal abdomen. Cord segment was collected. Cord gasses were collected, however due to fetal wellbeing were discarded. APGARS were noted to be 9 and 9. Delivery Date and Time: 2020 at 0027. The placenta was noted to be densely adhered to the anterior lower uterine segment.   Even with signs of placental separation no descent of placenta with slight traction and manual massage of the uterus. Placenta was removed manually with gentle dissection of the placental plane at the prior hysterotomy incision. Upon removal of the placenta a cotyledon was noted to be missing from the mid-placenta. 3 vessel cord was noted. Ultrasound was obtained with retained placental tissue noted at the anterior lower uterine segment. The cervix was grasped using a ring forcep. Banjo curette was then utilized under ultrasound guidance to remove placental tissue and retained membranes without difficulty. Following completion, uterus was noted to be firm and ultrasound revealed a thin endometrial stripe. Patient was given 2g Ancef due to curettage and manual removal of placenta. First degree laceration was noted and repaired with 2-0 Vicryl. Bilateral labial lacerations were noted, left labial laceration was repaired using 2-0 Vicryl in a single figure of eight stitch for hemostasis. The right labial laceration was repaired in a running, locked fashion using 3-0 Vicryl with excellent hemostasis noted following. The patient tolerated well and is in stable condition following delivery. EBL: 450 ml     Infant Wt: 7lbs 0.4oz    APGARS: 9, 9      Specimen:  Placenta / Cord segment      Condition:  infant stable and mother stable      Attending Attestation: I performed the procedure.     Miesha Hernández DO

## 2020-09-13 VITALS
DIASTOLIC BLOOD PRESSURE: 74 MMHG | BODY MASS INDEX: 23.19 KG/M2 | HEIGHT: 70 IN | TEMPERATURE: 98.1 F | SYSTOLIC BLOOD PRESSURE: 134 MMHG | HEART RATE: 67 BPM | RESPIRATION RATE: 16 BRPM | WEIGHT: 162 LBS

## 2020-09-13 LAB
HCT VFR BLD CALC: 27 % (ref 36–48)
HEMOGLOBIN: 9.4 G/DL (ref 12–16)

## 2020-09-13 PROCEDURE — 85014 HEMATOCRIT: CPT

## 2020-09-13 PROCEDURE — 36415 COLL VENOUS BLD VENIPUNCTURE: CPT

## 2020-09-13 PROCEDURE — 85018 HEMOGLOBIN: CPT

## 2020-09-13 PROCEDURE — 2580000003 HC RX 258: Performed by: OBSTETRICS & GYNECOLOGY

## 2020-09-13 PROCEDURE — 6370000000 HC RX 637 (ALT 250 FOR IP): Performed by: OBSTETRICS & GYNECOLOGY

## 2020-09-13 RX ORDER — IBUPROFEN 800 MG/1
800 TABLET ORAL EVERY 8 HOURS
Qty: 30 TABLET | Refills: 0 | Status: SHIPPED | OUTPATIENT
Start: 2020-09-13

## 2020-09-13 RX ORDER — PSEUDOEPHEDRINE HCL 30 MG
100 TABLET ORAL 2 TIMES DAILY
Qty: 60 CAPSULE | Refills: 0 | Status: SHIPPED | OUTPATIENT
Start: 2020-09-13

## 2020-09-13 RX ORDER — FERROUS SULFATE 325(65) MG
325 TABLET ORAL
Qty: 30 TABLET | Refills: 0 | Status: SHIPPED | OUTPATIENT
Start: 2020-09-13

## 2020-09-13 RX ORDER — BLOOD PRESSURE TEST KIT
1 KIT MISCELLANEOUS ONCE
Qty: 1 KIT | Refills: 0 | Status: SHIPPED | OUTPATIENT
Start: 2020-09-13 | End: 2020-09-13

## 2020-09-13 RX ORDER — HYDROCODONE BITARTRATE AND ACETAMINOPHEN 5; 325 MG/1; MG/1
1 TABLET ORAL EVERY 6 HOURS PRN
Qty: 12 TABLET | Refills: 0 | Status: SHIPPED | OUTPATIENT
Start: 2020-09-13 | End: 2020-09-16

## 2020-09-13 RX ADMIN — METFORMIN HYDROCHLORIDE 1 TABLET: 500 TABLET, EXTENDED RELEASE ORAL at 08:51

## 2020-09-13 RX ADMIN — FERROUS SULFATE TAB 325 MG (65 MG ELEMENTAL FE) 325 MG: 325 (65 FE) TAB at 08:52

## 2020-09-13 RX ADMIN — Medication 10 ML: at 08:52

## 2020-09-13 RX ADMIN — IBUPROFEN 800 MG: 800 TABLET, FILM COATED ORAL at 02:30

## 2020-09-13 RX ADMIN — ACETAMINOPHEN 650 MG: 325 TABLET ORAL at 06:20

## 2020-09-13 RX ADMIN — DOCUSATE SODIUM 100 MG: 100 CAPSULE, LIQUID FILLED ORAL at 08:52

## 2020-09-13 ASSESSMENT — PAIN SCALES - GENERAL
PAINLEVEL_OUTOF10: 0
PAINLEVEL_OUTOF10: 1
PAINLEVEL_OUTOF10: 1

## 2020-09-13 NOTE — FLOWSHEET NOTE
Post partum and  care discharge instructions reviewed with the patient. The patient states verbal understanding of the discharge instructions and agrees to schedule a follow up appointment with Dr Aldo De La Cruz for one week, and a follow up appointment for the baby on Monday/Tuesday. Written copy of the instructions given to the patient.

## 2020-09-13 NOTE — ANESTHESIA POSTPROCEDURE EVALUATION
Department of Anesthesiology  Postprocedure Note    Patient: Patric Majano  MRN: [id number]  YOB: 1990  Date of evaluation: 9/13/2020  Time:  4:21 PM     Procedure Summary     Date:  09/11/20 Room / Location:      Anesthesia Start:  [facility] Anesthesia Stop:  09/12/20 0027    Procedure:  Labor Analgesia Diagnosis:      Scheduled Providers:   Responsible Provider:  Lance Pierre MD    Anesthesia Type:  epidural ASA Status:  2 - Emergent          Anesthesia Type: epidural    Aguilar Phase I: Aguilar Score: 10    Aguilar Phase II:      Last vitals: Reviewed and per EMR flowsheets. Anesthesia Post Evaluation    Patient location during evaluation: bedside  Patient participation: complete - patient participated  Level of consciousness: awake and alert  Pain score: 0  Airway patency: patent  Nausea & Vomiting: no vomiting and no nausea  Complications: no  Cardiovascular status: hemodynamically stable  Respiratory status: acceptable and room air  Hydration status: stable  Comments: S/P epidural analgesia for vaginal delivery 9-. No apparent or reported anesthesia complications. VSS.

## 2020-09-13 NOTE — CARE COORDINATION
Social Work Consult/Assessment    Reason for Consult: \"Hx of mj use in pregnancy\" in chart, \"Hx of Heroin abuse\" per RN report  Electronic record reviewed: yes, confirmed demographics and reviewed for history  Delivery information: Vag delivery 9/12/20 1227 Baby Boy Sue Whipple  Marital Status: Single  Mob's UDS on admission:  neg  Infant's UDS/Cord tox: neg/pending    Met with Mob today explained purpose of visit, SW services. Present in the room: none  Living situation: MOB lives other 3 children and FOB Temitope Estrada  Spouse or significant other: Temitope Estrada  Children: Joel Evening [place]ade 13 girl, Carlita Robinsons 11 boy, Kamla [place] 8 boy  Children's Protective Sevices involvement: denies  Support system: states mother and sisters  Domestic Violence: denies  Mental Health: denies  Post Partum Depression: denies  Substance Abuse: States she is a former heroin user who was court ordered [facility] [address] in rehab \"[facility]\" in [place]. Has been sober for 4 years from opiates and off probation for 1 yr. Admits to mj use early in pregnancy, stopping in 6th month. Counseled against further use. Social Assistance Programs:  WIC___ Food Stamps_x__  Medicaid__x_  Supplies: Has crib, Has carseat  Every Child Succeeds: N/A    Summary: RN expresses no other concerns aside from documented above. Plan is for MOB and infant to d/c home today. SW will follow for cord results.

## 2020-09-13 NOTE — PROGRESS NOTES
Discharged to t car in wheelchair while holding infant in infant carseat.
Dr. Dali Marques performing bedside ultrasound after delivery to assess for retained placenta.
Dr. Rosemary Rojo updated of cervical exam and noting of grape-sized blood clot. Informed that pt is still having 5/10 lower abdominal pain associated with contractions unrelieved by placement of epidural and that RN is calling CRNA back to bedside for evaluation and redose.
LEATHA Hearn CRNA at bedside from [id number] for epidural placement. Pt tolerated procedure well. RN remains at bedside to monitor.
Pt assisted by 2 staff members to restroom for first time get up. Pt denies dizziness or lightheadedness. Gait steady. Pt voided 150 mL urine without difficulty. Pericare done by pt with RN instruction. New ice pack, pad and panties put on pt. Gown changed. Hand hygiene done. Complete linen change done and comfort pad put on bed. Pt tolerated well.
Spoke with Dr. Adry Chavarria over the phone at this time. Confirmed that Pitocin should be started as soon as possible and then titrated up by 1 milliU/min for a maximum of 16 milliU/min. RN to call with significant cervical change, increase in ctx intensity, and/or SROM overnight for Dr. Adry Chavarria to come to hospital and monitor pt until delivery.
Ultrasound guided curettage performed by Dr. Dali Marques at bedside after removal of placental.
62-86     - Asymptomatic     3. A pos / RI / GBS neg    4. Hx  delivery     - s/p Successful     5. S/p postpartum D&C     - Doing well, bleeding minimal, afebrile     - s/p 2g Ancef    6. Anemia     - Asymptomatic     - Hgb 9.4     - Home with Iron and colace    7.  Male fetus - circumcision today      Disposition:   Stable - consider discharge home PPD #1 or #2 as patient delivered just after midnight and is doing well - conditional for BP < 140/90 and asymptomatic   Follow Up in the office in 1 weeks   Given Rx for Ibuprofen and Norco for pain   Given Rx for Iron and colace for anemia    Berl Crumble, DO

## 2020-09-13 NOTE — FLOWSHEET NOTE
Discharge Phone Call Log  Patient Name: Svetlana Alvarado     [facility] Care Provider: Delvis Carlson DO Discharge Date: 2020    Disposition of baby:    Phone Number: [phone] (home)     Attempts to Contact:  Date:    Nurse  Date:    Nurse  Date:    Nurse    1. Now that you are at home is your pain being well controlled? Y/N   What pain reducing measures are you using? ____________________________________        Information for the patient's :  Andre Notice [[id number]]   Delivery Method: Vaginal, Spontaneous     2. Are you currently  having any infant feeding issues? Y/N _____________________________ If yes, please explain: __________________________________________________________________  3. If breastfeeding, were you satisfied with the breastfeeding support services offered? Y/N  4.  Have you had to supplement? Y/N If yes, please explain: _____________________________________________________  5. Did your OB provider offer you information about the benefits of breastfeeding during your prenatal visits? Y/N  6.  Have you made or have you already had your first appointment with the baby's doctor? Y/N If no, do you know when to schedule it? Y/N   7.  Have you scheduled your follow-up appointment? Y/N  If no, do you know when to schedule it? Y/N  8. Did staff discuss safe sleep during your stay? Y/N  Did you see the wall cling posted in your room explaining how to keep you and your baby safe? Y/N  9. Can you tell me at least 1 point you learned from reading or hearing about infant safety and safe sleep practices? 10. Did your nurses and physicians include you in the plan of care, communicating with you respectfully? Y/N If no, please explain __________________________  11. Is there anyone in particular you would like to mention who provided care for you? ________________________________  12. Did your discharge occur in a timely manner?   Y/N If no, please explain __________________________  13. Do you have any other questions or concerns I can address today?  Y/N  __________________________________________________      Teaching During interview :_____________________________________________  ___________________________RN       Date:______________Time:________________

## 2020-09-13 NOTE — LACTATION NOTE
This note was copied from a baby's chart. Lactation Progress Note      Data:   RN requests f/u on pt who decided to breast feed after delivery, then yesterday decided to bottle and formula feed. Pt stating today she would like to start pumping once she is home. Action: Introduced self to patient as [address] on for today and offered support and set up with breast pump while in the hospital. Explained importance of early and frequent stimulation to the breast to promote and establish a good milk supply. Pt declines set up with pump now, states wants to wait until home, and being discharged home later this evening. Pt states she has a pump at home to use. Pumping education reviewed including set up and use of breast pump, explained appropriate flange fit, and how to adjust pump suction, explaining that pumping should not cause discomfort or pain. Reviewed collection, storage, and preparation of expressed breast milk and how to clean pump equipment and sanitize. Reviewed what to expect with volumes expressed explaining that colostrum is thick, small in volume, and difficult for the pump to express. Reassured that thinner mature milk will be easier to express. Explained milk production and when to expect mature milk supply. Educated on benefits of STS, hand expression and breast massage/compressions to support pumping. Encouraged pt to pump q3h x 15 minutes and a minimum of 8x in a 24 hour period. Name and number provided on whiteboard. Encouraged to call for f/u lactation support and assistance with pumping as needed. Response: Verbalized understanding of teaching provided. Comfortable with plan of care at this time. Will call for f/u prn.

## 2020-09-14 NOTE — DISCHARGE SUMMARY
[facility]  Obstetric Discharge Summary        Admitting Diagnosis:   1. Preeclampsia  2. Prior  delivery  3. A pos / RI / GBS neg  4. Trichomonal vaginitis during pregnancy in first trimester  5. Amniotic band in second trimester, single or unspecified fetus  6. History of  delivery, currently pregnant  7. Anemia during pregnancy in third trimester  8. Maternal tobacco use in third trimester  9. History of heroin abuse ([address]  10. Echogenic focus of heart of fetus affecting antepartum care of mother, single or unspecified fetus    Discharge Diagnosis:  1. Karishma Cartagena is a 31 y.o. [id number] s/p successful  at 38w3d, PPD #1  2. Preeclampsia  3. A pos / RI / GBS neg   4. Hx  delivery  5. S/p postpartum D&C for retained placenta and membranes  6. Anemia  7. Male fetus - circumcision today    Procedures:   1.   2. Bedside US guided D&C     Referrals:    - Lactation Consultant       Information:   Delivery Date/Time: 2020 at 0027  Gender: Male  Fetal Weight: 7lbs 0.4oz  APGARS: 9 & 9   Fetal Feeding: Formula  Circumcision in hospital: Yes    Discharge Instructions:  Please call for increased pain not controlled by pain medications, significant vaginal bleeding greater than 1 pad/hour, temperature greater than 101 degrees F or any other concerns. Plan to follow up in 1 weeks. Diet:common adult    Activity:  Increase activity gradually. No heavy lifting or driving for Numbers; 2 weeks. Pelvic rest for 6 weeks. No intercourse, tampons, douching, baths.        Medications:   - Motrin    - Norco   - Iron / Colace    - BP kit    Disposition: Home    Condition on discharge: Stable    Follow up: in 1 week(s)    Pastor Zamudio, DO

## 2020-09-22 ENCOUNTER — TELEPHONE (OUTPATIENT)
Dept: OBGYN CLINIC | Age: 30
End: 2020-09-22

## 2020-09-23 ENCOUNTER — HOSPITAL ENCOUNTER (OUTPATIENT)
Age: 30
Discharge: HOME | End: 2020-09-25
Attending: OBSTETRICS & GYNECOLOGY | Admitting: OBSTETRICS & GYNECOLOGY
Payer: COMMERCIAL

## 2020-09-23 ENCOUNTER — POSTPARTUM VISIT (OUTPATIENT)
Dept: OBGYN CLINIC | Age: 30
End: 2020-09-23
Payer: COMMERCIAL

## 2020-09-23 VITALS
DIASTOLIC BLOOD PRESSURE: 102 MMHG | TEMPERATURE: 97.4 F | WEIGHT: 147 LBS | SYSTOLIC BLOOD PRESSURE: 172 MMHG | HEART RATE: 73 BPM | BODY MASS INDEX: 21.09 KG/M2

## 2020-09-23 LAB
A/G RATIO: 1.1 (ref 1.1–2.2)
A/G RATIO: 1.1 (ref 1.1–2.2)
ALBUMIN SERPL-MCNC: 3.7 G/DL (ref 3.4–5)
ALBUMIN SERPL-MCNC: 3.8 G/DL (ref 3.4–5)
ALP BLD-CCNC: 112 U/L (ref 40–129)
ALP BLD-CCNC: 113 U/L (ref 40–129)
ALT SERPL-CCNC: 15 U/L (ref 10–40)
ALT SERPL-CCNC: 16 U/L (ref 10–40)
ANION GAP SERPL CALCULATED.3IONS-SCNC: 10 MMOL/L (ref 3–16)
ANION GAP SERPL CALCULATED.3IONS-SCNC: 9 MMOL/L (ref 3–16)
APTT: 35 SEC (ref 24.2–36.2)
AST SERPL-CCNC: 14 U/L (ref 15–37)
AST SERPL-CCNC: 14 U/L (ref 15–37)
BACTERIA: ABNORMAL /HPF
BASOPHILS ABSOLUTE: 0.1 K/UL (ref 0–0.2)
BASOPHILS RELATIVE PERCENT: 0.9 %
BILIRUB SERPL-MCNC: <0.2 MG/DL (ref 0–1)
BILIRUB SERPL-MCNC: <0.2 MG/DL (ref 0–1)
BILIRUBIN URINE: NEGATIVE
BLOOD, URINE: ABNORMAL
BUN BLDV-MCNC: 18 MG/DL (ref 7–20)
BUN BLDV-MCNC: 18 MG/DL (ref 7–20)
CALCIUM SERPL-MCNC: 8.6 MG/DL (ref 8.3–10.6)
CALCIUM SERPL-MCNC: 8.9 MG/DL (ref 8.3–10.6)
CHLORIDE BLD-SCNC: 100 MMOL/L (ref 99–110)
CHLORIDE BLD-SCNC: 102 MMOL/L (ref 99–110)
CLARITY: CLEAR
CO2: 26 MMOL/L (ref 21–32)
CO2: 29 MMOL/L (ref 21–32)
COLOR: YELLOW
CREAT SERPL-MCNC: 0.7 MG/DL (ref 0.6–1.1)
CREAT SERPL-MCNC: 0.7 MG/DL (ref 0.6–1.1)
CREATININE URINE: 32.3 MG/DL (ref 28–259)
EOSINOPHILS ABSOLUTE: 0.2 K/UL (ref 0–0.6)
EOSINOPHILS RELATIVE PERCENT: 2.6 %
EPITHELIAL CELLS, UA: ABNORMAL /HPF (ref 0–5)
FIBRINOGEN: 425 MG/DL (ref 200–397)
GFR AFRICAN AMERICAN: >60
GFR AFRICAN AMERICAN: >60
GFR NON-AFRICAN AMERICAN: >60
GFR NON-AFRICAN AMERICAN: >60
GLOBULIN: 3.4 G/DL
GLOBULIN: 3.4 G/DL
GLUCOSE BLD-MCNC: 102 MG/DL (ref 70–99)
GLUCOSE BLD-MCNC: 111 MG/DL (ref 70–99)
GLUCOSE URINE: NEGATIVE MG/DL
HCT VFR BLD CALC: 35.5 % (ref 36–48)
HCT VFR BLD CALC: 35.6 % (ref 36–48)
HEMOGLOBIN: 12.1 G/DL (ref 12–16)
HEMOGLOBIN: 12.2 G/DL (ref 12–16)
INR BLD: 0.97 (ref 0.86–1.14)
KETONES, URINE: NEGATIVE MG/DL
LEUKOCYTE ESTERASE, URINE: ABNORMAL
LYMPHOCYTES ABSOLUTE: 2.6 K/UL (ref 1–5.1)
LYMPHOCYTES RELATIVE PERCENT: 37.6 %
MAGNESIUM: 1.9 MG/DL (ref 1.8–2.4)
MAGNESIUM: 4.4 MG/DL (ref 1.8–2.4)
MCH RBC QN AUTO: 30.9 PG (ref 26–34)
MCH RBC QN AUTO: 31.4 PG (ref 26–34)
MCHC RBC AUTO-ENTMCNC: 33.9 G/DL (ref 31–36)
MCHC RBC AUTO-ENTMCNC: 34.5 G/DL (ref 31–36)
MCV RBC AUTO: 91 FL (ref 80–100)
MCV RBC AUTO: 91.1 FL (ref 80–100)
MICROSCOPIC EXAMINATION: YES
MONOCYTES ABSOLUTE: 0.3 K/UL (ref 0–1.3)
MONOCYTES RELATIVE PERCENT: 4.1 %
NEUTROPHILS ABSOLUTE: 3.8 K/UL (ref 1.7–7.7)
NEUTROPHILS RELATIVE PERCENT: 54.8 %
NITRITE, URINE: NEGATIVE
PDW BLD-RTO: 12.8 % (ref 12.4–15.4)
PDW BLD-RTO: 13.1 % (ref 12.4–15.4)
PH UA: 6 (ref 5–8)
PLATELET # BLD: 284 K/UL (ref 135–450)
PLATELET # BLD: 286 K/UL (ref 135–450)
PMV BLD AUTO: 8.6 FL (ref 5–10.5)
PMV BLD AUTO: 8.7 FL (ref 5–10.5)
POTASSIUM REFLEX MAGNESIUM: 4.2 MMOL/L (ref 3.5–5.1)
POTASSIUM SERPL-SCNC: 4.6 MMOL/L (ref 3.5–5.1)
PROTEIN PROTEIN: 7 MG/DL
PROTEIN UA: NEGATIVE MG/DL
PROTEIN/CREAT RATIO: 0.2 MG/DL
PROTHROMBIN TIME: 11.3 SEC (ref 10–13.2)
RBC # BLD: 3.9 M/UL (ref 4–5.2)
RBC # BLD: 3.91 M/UL (ref 4–5.2)
RBC UA: ABNORMAL /HPF (ref 0–4)
SODIUM BLD-SCNC: 137 MMOL/L (ref 136–145)
SODIUM BLD-SCNC: 139 MMOL/L (ref 136–145)
SPECIFIC GRAVITY UA: 1.01 (ref 1–1.03)
TOTAL PROTEIN: 7.1 G/DL (ref 6.4–8.2)
TOTAL PROTEIN: 7.2 G/DL (ref 6.4–8.2)
URIC ACID, SERUM: 5.3 MG/DL (ref 2.6–6)
URIC ACID, SERUM: 5.4 MG/DL (ref 2.6–6)
URINE TYPE: ABNORMAL
UROBILINOGEN, URINE: 0.2 E.U./DL
WBC # BLD: 7 K/UL (ref 4–11)
WBC # BLD: 8.4 K/UL (ref 4–11)
WBC UA: ABNORMAL /HPF (ref 0–5)

## 2020-09-23 PROCEDURE — 82570 ASSAY OF URINE CREATININE: CPT

## 2020-09-23 PROCEDURE — 36415 COLL VENOUS BLD VENIPUNCTURE: CPT

## 2020-09-23 PROCEDURE — 2580000003 HC RX 258: Performed by: OBSTETRICS & GYNECOLOGY

## 2020-09-23 PROCEDURE — 2500000003 HC RX 250 WO HCPCS

## 2020-09-23 PROCEDURE — 96361 HYDRATE IV INFUSION ADD-ON: CPT

## 2020-09-23 PROCEDURE — 84550 ASSAY OF BLOOD/URIC ACID: CPT

## 2020-09-23 PROCEDURE — 99221 1ST HOSP IP/OBS SF/LOW 40: CPT | Performed by: OBSTETRICS & GYNECOLOGY

## 2020-09-23 PROCEDURE — 81001 URINALYSIS AUTO W/SCOPE: CPT

## 2020-09-23 PROCEDURE — 85610 PROTHROMBIN TIME: CPT

## 2020-09-23 PROCEDURE — 80053 COMPREHEN METABOLIC PANEL: CPT

## 2020-09-23 PROCEDURE — 84156 ASSAY OF PROTEIN URINE: CPT

## 2020-09-23 PROCEDURE — 85384 FIBRINOGEN ACTIVITY: CPT

## 2020-09-23 PROCEDURE — 6370000000 HC RX 637 (ALT 250 FOR IP): Performed by: OBSTETRICS & GYNECOLOGY

## 2020-09-23 PROCEDURE — 96360 HYDRATION IV INFUSION INIT: CPT

## 2020-09-23 PROCEDURE — 85027 COMPLETE CBC AUTOMATED: CPT

## 2020-09-23 PROCEDURE — 85730 THROMBOPLASTIN TIME PARTIAL: CPT

## 2020-09-23 PROCEDURE — 6360000002 HC RX W HCPCS: Performed by: OBSTETRICS & GYNECOLOGY

## 2020-09-23 PROCEDURE — 83735 ASSAY OF MAGNESIUM: CPT

## 2020-09-23 PROCEDURE — 85025 COMPLETE CBC W/AUTO DIFF WBC: CPT

## 2020-09-23 RX ORDER — SODIUM CHLORIDE 0.9 % (FLUSH) 0.9 %
10 SYRINGE (ML) INJECTION EVERY 12 HOURS SCHEDULED
Status: DISCONTINUED | OUTPATIENT
Start: 2020-09-23 | End: 2020-09-25 | Stop reason: HOSPADM

## 2020-09-23 RX ORDER — SIMETHICONE 80 MG
80 TABLET,CHEWABLE ORAL EVERY 6 HOURS PRN
Status: DISCONTINUED | OUTPATIENT
Start: 2020-09-23 | End: 2020-09-25 | Stop reason: HOSPADM

## 2020-09-23 RX ORDER — NIFEDIPINE 30 MG/1
30 TABLET, EXTENDED RELEASE ORAL DAILY
Status: DISCONTINUED | OUTPATIENT
Start: 2020-09-23 | End: 2020-09-25 | Stop reason: HOSPADM

## 2020-09-23 RX ORDER — MAGNESIUM SULFATE IN WATER 40 MG/ML
4 INJECTION, SOLUTION INTRAVENOUS ONCE
Status: COMPLETED | OUTPATIENT
Start: 2020-09-23 | End: 2020-09-23

## 2020-09-23 RX ORDER — SODIUM CHLORIDE 0.9 % (FLUSH) 0.9 %
10 SYRINGE (ML) INJECTION PRN
Status: DISCONTINUED | OUTPATIENT
Start: 2020-09-23 | End: 2020-09-25 | Stop reason: HOSPADM

## 2020-09-23 RX ORDER — HYDROCODONE BITARTRATE AND ACETAMINOPHEN 5; 325 MG/1; MG/1
1 TABLET ORAL EVERY 6 HOURS PRN
COMMUNITY

## 2020-09-23 RX ORDER — LABETALOL HYDROCHLORIDE 5 MG/ML
20 INJECTION, SOLUTION INTRAVENOUS
Status: COMPLETED | OUTPATIENT
Start: 2020-09-23 | End: 2020-09-23

## 2020-09-23 RX ORDER — ACETAMINOPHEN 325 MG/1
650 TABLET ORAL EVERY 6 HOURS PRN
Status: DISCONTINUED | OUTPATIENT
Start: 2020-09-23 | End: 2020-09-25 | Stop reason: HOSPADM

## 2020-09-23 RX ORDER — ACETAMINOPHEN 325 MG/1
650 TABLET ORAL EVERY 4 HOURS PRN
Status: DISCONTINUED | OUTPATIENT
Start: 2020-09-23 | End: 2020-09-25 | Stop reason: HOSPADM

## 2020-09-23 RX ORDER — LABETALOL HYDROCHLORIDE 5 MG/ML
40 INJECTION, SOLUTION INTRAVENOUS
Status: ACTIVE | OUTPATIENT
Start: 2020-09-23 | End: 2020-09-23

## 2020-09-23 RX ORDER — LABETALOL HYDROCHLORIDE 5 MG/ML
INJECTION, SOLUTION INTRAVENOUS
Status: COMPLETED
Start: 2020-09-23 | End: 2020-09-23

## 2020-09-23 RX ORDER — ONDANSETRON 2 MG/ML
4 INJECTION INTRAMUSCULAR; INTRAVENOUS EVERY 6 HOURS PRN
Status: DISCONTINUED | OUTPATIENT
Start: 2020-09-23 | End: 2020-09-25 | Stop reason: HOSPADM

## 2020-09-23 RX ORDER — LABETALOL HYDROCHLORIDE 5 MG/ML
80 INJECTION, SOLUTION INTRAVENOUS
Status: ACTIVE | OUTPATIENT
Start: 2020-09-23 | End: 2020-09-23

## 2020-09-23 RX ORDER — FAMOTIDINE 20 MG/1
20 TABLET, FILM COATED ORAL 2 TIMES DAILY
Status: DISCONTINUED | OUTPATIENT
Start: 2020-09-23 | End: 2020-09-25 | Stop reason: HOSPADM

## 2020-09-23 RX ORDER — DOCUSATE SODIUM 100 MG/1
100 CAPSULE, LIQUID FILLED ORAL 2 TIMES DAILY
Status: DISCONTINUED | OUTPATIENT
Start: 2020-09-23 | End: 2020-09-25 | Stop reason: HOSPADM

## 2020-09-23 RX ORDER — SODIUM CHLORIDE, SODIUM LACTATE, POTASSIUM CHLORIDE, CALCIUM CHLORIDE 600; 310; 30; 20 MG/100ML; MG/100ML; MG/100ML; MG/100ML
INJECTION, SOLUTION INTRAVENOUS CONTINUOUS
Status: DISCONTINUED | OUTPATIENT
Start: 2020-09-23 | End: 2020-09-25 | Stop reason: HOSPADM

## 2020-09-23 RX ORDER — CALCIUM GLUCONATE 94 MG/ML
1 INJECTION, SOLUTION INTRAVENOUS PRN
Status: DISCONTINUED | OUTPATIENT
Start: 2020-09-23 | End: 2020-09-25 | Stop reason: HOSPADM

## 2020-09-23 RX ORDER — HYDRALAZINE HYDROCHLORIDE 20 MG/ML
10 INJECTION INTRAMUSCULAR; INTRAVENOUS
Status: ACTIVE | OUTPATIENT
Start: 2020-09-23 | End: 2020-09-23

## 2020-09-23 RX ADMIN — NIFEDIPINE 30 MG: 30 TABLET, FILM COATED, EXTENDED RELEASE ORAL at 13:36

## 2020-09-23 RX ADMIN — SODIUM CHLORIDE, POTASSIUM CHLORIDE, SODIUM LACTATE AND CALCIUM CHLORIDE: 600; 310; 30; 20 INJECTION, SOLUTION INTRAVENOUS at 18:19

## 2020-09-23 RX ADMIN — MAGNESIUM SULFATE HEPTAHYDRATE 4 G: 40 INJECTION, SOLUTION INTRAVENOUS at 18:41

## 2020-09-23 RX ADMIN — LABETALOL HYDROCHLORIDE 20 MG: 5 INJECTION INTRAVENOUS at 17:37

## 2020-09-23 RX ADMIN — MAGNESIUM SULFATE IN WATER 2 G/HR: 40 INJECTION, SOLUTION INTRAVENOUS at 19:10

## 2020-09-23 RX ADMIN — ACETAMINOPHEN 650 MG: 325 TABLET ORAL at 22:19

## 2020-09-23 RX ADMIN — LABETALOL HYDROCHLORIDE 20 MG: 5 INJECTION, SOLUTION INTRAVENOUS at 17:37

## 2020-09-23 ASSESSMENT — PAIN SCALES - GENERAL: PAINLEVEL_OUTOF10: 8

## 2020-09-23 NOTE — FLOWSHEET NOTE
Called Dr Elana Wyatt about this severe range blood pressure:     09/23/20 1655   Vital Signs   Pulse 71   BP (!) 161/85     Order to give 20mg IV push labetelol and follow protocol to repeat BP's after giving ant- hypertensive agent. States she will be over to see patient soon.

## 2020-09-23 NOTE — FLOWSHEET NOTE
Spoke with Dr. Jannetta Brittle regarding patient's arrival to triage. Orders previous received from Dr. Shante Pulido before patient arrived from office. Discussed BPs and labs. Labs WNL. BPs 140s/80s with one BP 170s/80s. Orders to continue to observe for another hour. Physician to come to triage to evaluate patient.

## 2020-09-23 NOTE — PROGRESS NOTES
Maternal emotional well being screening form completed and reviewed with patient. Current score is 0. Patient given referral to [address] [place] [address] ([phone]):  No

## 2020-09-23 NOTE — PROGRESS NOTES
Patient with continued severe range blood pressures and mild headache. Treated with IV labetalol x1. Risks, benefits and alternatives were reviewed with the patient. Will start magnesium for seizure prophylaxis.       Will closely monitor    Getachew Andrew, DO

## 2020-09-23 NOTE — H&P
Start Date End Date Taking? Authorizing Provider   HYDROcodone-acetaminophen (NORCO) 5-325 MG per tablet Take 1 tablet by mouth every 6 hours as needed for Pain. Yes Historical Provider, MD   ibuprofen (ADVIL;MOTRIN) 800 MG tablet Take 1 tablet by mouth every 8 hours 20  Yes Val Read DO   ferrous sulfate (IRON 325) 325 (65 Fe) MG tablet Take 1 tablet by mouth daily (with breakfast) 20  Yes Val Read DO   docusate sodium (COLACE, DULCOLAX) 100 MG CAPS Take 100 mg by mouth 2 times daily 20  Yes Val Read DO   Prenatal Vit-Fe Fumarate-FA (PRENATAL PLUS) 27-1 MG TABS Take 1 tablet by mouth daily 20  Yes Nasreenelie Conley DO   gabapentin (NEURONTIN) 100 MG capsule Take 100 mg by mouth daily. Yes Historical Provider, MD   Blood Pressure KIT 1 Device by Does not apply route once for 1 dose 20  Val Read DO      Allergies:  Patient has no known allergies.      Surgical History:  Past Surgical History:   Procedure Laterality Date     SECTION      DILATION AND CURETTAGE      TONSILLECTOMY AND ADENOIDECTOMY       Family History:  Family History   Problem Relation Age of Onset    Cancer Maternal Grandmother     High Blood Pressure Maternal Grandmother      Social History:  Social History     Substance and Sexual Activity   Alcohol Use No     Social History     Substance and Sexual Activity   Drug Use Not Currently    Types: Marijuana    Comment: prio heroin, tring to wean off     Social History     Tobacco Use   Smoking Status Current Every Day Smoker    Packs/day: 0.50    Years: 15.00    Pack years: 7.50    Types: Cigarettes   Smokeless Tobacco Never Used     Physical Exam:  BP (!) 159/85   Pulse (!) 49   Temp 98.2 °F (36.8 °C) (Oral)   Resp 16   LMP 2019   General: Alert, well appearing, no acute distress  Head: Normocephalic, atraumatic  Lungs: Clear to auscultation bilaterally without rales, rhonchi, wheezing  CV: Regular rate and regular rhythm, normal S1, S2 without murmurs, rubs, clicks or gallops. Abdomen: Gravid, soft, non-tender, non-distended. No rebound or guarding. Extremities: No redness or tenderness, neg Nakita's sign. No pedal edema. Skin: Well perfused, normal coloration and turgor, no lesions or rashes visualized  Neuro: Alert, oriented, normal speech, no focal deficits, moves extremities appropriately. Patellar reflexes 1+ bilaterally, no clonus.    Psych: Appropriate, normal affect, appears stated age    Labs:  Admission on 09/23/2020   Component Date Value    Sodium 09/23/2020 137     Potassium 09/23/2020 4.6     Chloride 09/23/2020 102     CO2 09/23/2020 26     Anion Gap 09/23/2020 9     Glucose 09/23/2020 111*    BUN 09/23/2020 18     CREATININE 09/23/2020 0.7     GFR Non- 09/23/2020 >60     GFR  09/23/2020 >60     Calcium 09/23/2020 8.9     Total Protein 09/23/2020 7.1     Alb 09/23/2020 3.7     Albumin/Globulin Ratio 09/23/2020 1.1     Total Bilirubin 09/23/2020 <0.2     Alkaline Phosphatase 09/23/2020 112     ALT 09/23/2020 15     AST 09/23/2020 14*    Globulin 09/23/2020 3.4     WBC 09/23/2020 7.0     RBC 09/23/2020 3.90*    Hemoglobin 09/23/2020 12.2     Hematocrit 09/23/2020 35.5*    MCV 09/23/2020 91.0     MCH 09/23/2020 31.4     MCHC 09/23/2020 34.5     RDW 09/23/2020 13.1     Platelets 59/28/1719 286     MPV 09/23/2020 8.7     Neutrophils % 09/23/2020 54.8     Lymphocytes % 09/23/2020 37.6     Monocytes % 09/23/2020 4.1     Eosinophils % 09/23/2020 2.6     Basophils % 09/23/2020 0.9     Neutrophils Absolute 09/23/2020 3.8     Lymphocytes Absolute 09/23/2020 2.6     Monocytes Absolute 09/23/2020 0.3     Eosinophils Absolute 09/23/2020 0.2     Basophils Absolute 09/23/2020 0.1     Protime 09/23/2020 11.3     INR 09/23/2020 0.97     aPTT 09/23/2020 35.0     Fibrinogen 09/23/2020 425*    Uric Acid, Serum 2020 5.4     Color, UA 2020 Yellow     Clarity, UA 2020 Clear     Glucose, Ur 2020 Negative     Bilirubin Urine 2020 Negative     Ketones, Urine 2020 Negative     Specific Gravity, UA 2020 1.010     Blood, Urine 2020 MODERATE*    pH, UA 2020 6.0     Protein, UA 2020 Negative     Urobilinogen, Urine 2020 0.2     Nitrite, Urine 2020 Negative     Leukocyte Esterase, Urine 2020 MODERATE*    Microscopic Examination 2020 YES     Urine Type 2020 NotGiven     Protein, Ur 2020 7.00     Creatinine, Ur 2020 32.3     Protein/Creat Ratio 2020 0.2     WBC, UA 2020 10-20*    RBC, UA 2020 11-20*    Epithelial Cells, UA 2020 6-10*    Bacteria, UA 2020 1+*       Assessment/Plan:     Yemi Lehman is a 31 y.o. [id number] s/p  at 38w3d, POD #11    1.  Preeclampsia with severe features     - Severe range Bps on admission, however labile in nature     - Headache that is improving with Tylenol     - HELLP labs stable     - Will start PO procardia and admit for observation     - Reviewed that if continues with severe range Bps will consider magnesium for seizure prophylaxis     - Routine postpartum care - see orders    Pelon Valencia DO

## 2020-09-24 LAB
A/G RATIO: 1.1 (ref 1.1–2.2)
A/G RATIO: 1.1 (ref 1.1–2.2)
ALBUMIN SERPL-MCNC: 4.1 G/DL (ref 3.4–5)
ALBUMIN SERPL-MCNC: 4.1 G/DL (ref 3.4–5)
ALP BLD-CCNC: 129 U/L (ref 40–129)
ALP BLD-CCNC: 129 U/L (ref 40–129)
ALT SERPL-CCNC: 17 U/L (ref 10–40)
ALT SERPL-CCNC: 19 U/L (ref 10–40)
ANION GAP SERPL CALCULATED.3IONS-SCNC: 11 MMOL/L (ref 3–16)
ANION GAP SERPL CALCULATED.3IONS-SCNC: 13 MMOL/L (ref 3–16)
AST SERPL-CCNC: 15 U/L (ref 15–37)
AST SERPL-CCNC: 17 U/L (ref 15–37)
BASOPHILS ABSOLUTE: 0.1 K/UL (ref 0–0.2)
BASOPHILS ABSOLUTE: 0.1 K/UL (ref 0–0.2)
BASOPHILS RELATIVE PERCENT: 1.1 %
BASOPHILS RELATIVE PERCENT: 1.4 %
BILIRUB SERPL-MCNC: 0.3 MG/DL (ref 0–1)
BILIRUB SERPL-MCNC: <0.2 MG/DL (ref 0–1)
BUN BLDV-MCNC: 10 MG/DL (ref 7–20)
BUN BLDV-MCNC: 14 MG/DL (ref 7–20)
CALCIUM SERPL-MCNC: 7.4 MG/DL (ref 8.3–10.6)
CALCIUM SERPL-MCNC: 7.9 MG/DL (ref 8.3–10.6)
CANDIDA SPECIES, DNA PROBE: NORMAL
CHLORIDE BLD-SCNC: 97 MMOL/L (ref 99–110)
CHLORIDE BLD-SCNC: 97 MMOL/L (ref 99–110)
CO2: 26 MMOL/L (ref 21–32)
CO2: 28 MMOL/L (ref 21–32)
CREAT SERPL-MCNC: 0.7 MG/DL (ref 0.6–1.1)
CREAT SERPL-MCNC: 0.7 MG/DL (ref 0.6–1.1)
EOSINOPHILS ABSOLUTE: 0.2 K/UL (ref 0–0.6)
EOSINOPHILS ABSOLUTE: 0.2 K/UL (ref 0–0.6)
EOSINOPHILS RELATIVE PERCENT: 2 %
EOSINOPHILS RELATIVE PERCENT: 2.1 %
GARDNERELLA VAGINALIS, DNA PROBE: NORMAL
GFR AFRICAN AMERICAN: >60
GFR AFRICAN AMERICAN: >60
GFR NON-AFRICAN AMERICAN: >60
GFR NON-AFRICAN AMERICAN: >60
GLOBULIN: 3.8 G/DL
GLOBULIN: 3.8 G/DL
GLUCOSE BLD-MCNC: 115 MG/DL (ref 70–99)
GLUCOSE BLD-MCNC: 180 MG/DL (ref 70–99)
HCT VFR BLD CALC: 39.7 % (ref 36–48)
HCT VFR BLD CALC: 39.7 % (ref 36–48)
HEMOGLOBIN: 13.6 G/DL (ref 12–16)
HEMOGLOBIN: 14 G/DL (ref 12–16)
LYMPHOCYTES ABSOLUTE: 1.8 K/UL (ref 1–5.1)
LYMPHOCYTES ABSOLUTE: 2.5 K/UL (ref 1–5.1)
LYMPHOCYTES RELATIVE PERCENT: 23.1 %
LYMPHOCYTES RELATIVE PERCENT: 29.3 %
MAGNESIUM: 4.3 MG/DL (ref 1.8–2.4)
MAGNESIUM: 5.6 MG/DL (ref 1.8–2.4)
MAGNESIUM: 6.3 MG/DL (ref 1.8–2.4)
MAGNESIUM: 6.6 MG/DL (ref 1.8–2.4)
MCH RBC QN AUTO: 31 PG (ref 26–34)
MCH RBC QN AUTO: 31.8 PG (ref 26–34)
MCHC RBC AUTO-ENTMCNC: 34.3 G/DL (ref 31–36)
MCHC RBC AUTO-ENTMCNC: 35.3 G/DL (ref 31–36)
MCV RBC AUTO: 90.1 FL (ref 80–100)
MCV RBC AUTO: 90.4 FL (ref 80–100)
MONOCYTES ABSOLUTE: 0.3 K/UL (ref 0–1.3)
MONOCYTES ABSOLUTE: 0.3 K/UL (ref 0–1.3)
MONOCYTES RELATIVE PERCENT: 3.8 %
MONOCYTES RELATIVE PERCENT: 3.9 %
NEUTROPHILS ABSOLUTE: 5.5 K/UL (ref 1.7–7.7)
NEUTROPHILS ABSOLUTE: 5.5 K/UL (ref 1.7–7.7)
NEUTROPHILS RELATIVE PERCENT: 63.4 %
NEUTROPHILS RELATIVE PERCENT: 69.9 %
PDW BLD-RTO: 13.2 % (ref 12.4–15.4)
PDW BLD-RTO: 13.4 % (ref 12.4–15.4)
PLATELET # BLD: 308 K/UL (ref 135–450)
PLATELET # BLD: 331 K/UL (ref 135–450)
PMV BLD AUTO: 8.1 FL (ref 5–10.5)
PMV BLD AUTO: 8.6 FL (ref 5–10.5)
POTASSIUM SERPL-SCNC: 4.5 MMOL/L (ref 3.5–5.1)
POTASSIUM SERPL-SCNC: 4.7 MMOL/L (ref 3.5–5.1)
RBC # BLD: 4.39 M/UL (ref 4–5.2)
RBC # BLD: 4.41 M/UL (ref 4–5.2)
SODIUM BLD-SCNC: 136 MMOL/L (ref 136–145)
SODIUM BLD-SCNC: 136 MMOL/L (ref 136–145)
TOTAL PROTEIN: 7.9 G/DL (ref 6.4–8.2)
TOTAL PROTEIN: 7.9 G/DL (ref 6.4–8.2)
TRICHOMONAS VAGINALIS DNA: NORMAL
URIC ACID, SERUM: 5.5 MG/DL (ref 2.6–6)
URIC ACID, SERUM: 5.7 MG/DL (ref 2.6–6)
WBC # BLD: 7.8 K/UL (ref 4–11)
WBC # BLD: 8.6 K/UL (ref 4–11)

## 2020-09-24 PROCEDURE — 96361 HYDRATE IV INFUSION ADD-ON: CPT

## 2020-09-24 PROCEDURE — 6370000000 HC RX 637 (ALT 250 FOR IP): Performed by: OBSTETRICS & GYNECOLOGY

## 2020-09-24 PROCEDURE — 83735 ASSAY OF MAGNESIUM: CPT

## 2020-09-24 PROCEDURE — 85025 COMPLETE CBC W/AUTO DIFF WBC: CPT

## 2020-09-24 PROCEDURE — 84550 ASSAY OF BLOOD/URIC ACID: CPT

## 2020-09-24 PROCEDURE — 80053 COMPREHEN METABOLIC PANEL: CPT

## 2020-09-24 PROCEDURE — 6360000002 HC RX W HCPCS: Performed by: OBSTETRICS & GYNECOLOGY

## 2020-09-24 PROCEDURE — 6360000002 HC RX W HCPCS

## 2020-09-24 PROCEDURE — 2580000003 HC RX 258: Performed by: OBSTETRICS & GYNECOLOGY

## 2020-09-24 PROCEDURE — 36415 COLL VENOUS BLD VENIPUNCTURE: CPT

## 2020-09-24 RX ORDER — BUTALBITAL, ACETAMINOPHEN AND CAFFEINE 50; 325; 40 MG/1; MG/1; MG/1
1 TABLET ORAL EVERY 4 HOURS PRN
Status: DISCONTINUED | OUTPATIENT
Start: 2020-09-24 | End: 2020-09-25 | Stop reason: HOSPADM

## 2020-09-24 RX ORDER — PROCHLORPERAZINE EDISYLATE 5 MG/ML
10 INJECTION INTRAMUSCULAR; INTRAVENOUS ONCE
Status: COMPLETED | OUTPATIENT
Start: 2020-09-24 | End: 2020-09-24

## 2020-09-24 RX ORDER — DIPHENHYDRAMINE HYDROCHLORIDE 50 MG/ML
INJECTION INTRAMUSCULAR; INTRAVENOUS
Status: COMPLETED
Start: 2020-09-24 | End: 2020-09-24

## 2020-09-24 RX ORDER — DIPHENHYDRAMINE HYDROCHLORIDE 50 MG/ML
12.5 INJECTION INTRAMUSCULAR; INTRAVENOUS ONCE
Status: COMPLETED | OUTPATIENT
Start: 2020-09-24 | End: 2020-09-24

## 2020-09-24 RX ADMIN — BUTALBITAL, ACETAMINOPHEN, AND CAFFEINE 1 TABLET: 50; 325; 40 TABLET ORAL at 20:21

## 2020-09-24 RX ADMIN — BUTALBITAL, ACETAMINOPHEN, AND CAFFEINE 1 TABLET: 50; 325; 40 TABLET ORAL at 08:55

## 2020-09-24 RX ADMIN — DIPHENHYDRAMINE HYDROCHLORIDE 12.5 MG: 50 INJECTION INTRAMUSCULAR; INTRAVENOUS at 14:51

## 2020-09-24 RX ADMIN — DIPHENHYDRAMINE HYDROCHLORIDE 12.5 MG: 50 INJECTION, SOLUTION INTRAMUSCULAR; INTRAVENOUS at 14:51

## 2020-09-24 RX ADMIN — ACETAMINOPHEN 650 MG: 325 TABLET ORAL at 05:03

## 2020-09-24 RX ADMIN — PROCHLORPERAZINE EDISYLATE 10 MG: 5 INJECTION INTRAMUSCULAR; INTRAVENOUS at 14:53

## 2020-09-24 RX ADMIN — NIFEDIPINE 30 MG: 30 TABLET, FILM COATED, EXTENDED RELEASE ORAL at 08:56

## 2020-09-24 RX ADMIN — BUTALBITAL, ACETAMINOPHEN, AND CAFFEINE 1 TABLET: 50; 325; 40 TABLET ORAL at 12:43

## 2020-09-24 RX ADMIN — MAGNESIUM SULFATE IN WATER 2 G/HR: 40 INJECTION, SOLUTION INTRAVENOUS at 15:26

## 2020-09-24 RX ADMIN — SODIUM CHLORIDE, POTASSIUM CHLORIDE, SODIUM LACTATE AND CALCIUM CHLORIDE: 600; 310; 30; 20 INJECTION, SOLUTION INTRAVENOUS at 07:00

## 2020-09-24 RX ADMIN — MAGNESIUM SULFATE IN WATER 2 G/HR: 40 INJECTION, SOLUTION INTRAVENOUS at 05:21

## 2020-09-24 ASSESSMENT — PAIN SCALES - GENERAL
PAINLEVEL_OUTOF10: 6
PAINLEVEL_OUTOF10: 2
PAINLEVEL_OUTOF10: 7
PAINLEVEL_OUTOF10: 5

## 2020-09-24 NOTE — PLAN OF CARE
Problem: Coping:  Goal: Coping behaviors will improve  Description: Coping behaviors will improve  Outcome: Ongoing  Goal: Ability to verbalize feelings will improve  Description: Ability to verbalize feelings will improve  Outcome: Ongoing     Problem: Fluid Volume:  Goal: Will maintain adequate fluid volume  Description: Will maintain adequate fluid volume  Outcome: Ongoing  Goal: Ability to achieve and maintain adequate urine output will improve  Description: Ability to achieve and maintain adequate urine output will improve  Outcome: Ongoing  Goal: Peripheral tissue perfusion will improve  Description: Peripheral tissue perfusion will improve  Outcome: Ongoing     Problem: Physical Regulation:  Goal: Risk for medication side effects will decrease  Description: Risk for medication side effects will decrease  Outcome: Ongoing  Goal: Will remain free of preeclampsia complications  Description: Will remain free of preeclampsia complications  Outcome: Ongoing  Goal: Signs of adequate cerebral perfusion will increase  Description: Signs of adequate cerebral perfusion will increase  Outcome: Ongoing     Problem: Sensory:  Goal: Ability to compensate for vision loss will improve  Description: Ability to compensate for vision loss will improve  Outcome: Ongoing

## 2020-09-24 NOTE — PROGRESS NOTES
Notified Dr. Dali Marques of panic value of 4.4 magnesium. MD acknowledged. Will continue to monitor.

## 2020-09-24 NOTE — PROGRESS NOTES
Post Partum Progress Note    Subjective:  Camron Ponce is a 31 y.o. [id number] status-post   at 38+3 readmitted with preeclampsia with severe features. The pregnancy was complicated by preeclampsia, prior  delivery, trichomonas, amniotic band, hx  delivery, anemia, tobacco use, echogenic focus, hx of heroin abuse. Patient is doing OK this afternoon. Continues to report headache, has received tylenol and fioricet. Says the fioricet helped some initially but the headache came back. Does have a history of migraines, says this feels a little different than those. No blurry vision, no chest pain, no SOB. Denies RUQ pain. Otherwise pain is controlled, no dizziness/lightheadedness. Objective:  /81   Pulse 71   Temp 97.5 °F (36.4 °C) (Oral)   Resp 18   LMP 2019     GENERAL APPEARANCE: alert, well appearing, in no apparent distress  LUNGS: CTAB, no wheezes/rales  HEART: regular rate and rhythm  ABDOMEN POSTPARTUM: soft, nontender  EXTREMITIES: no redness or tenderness in the calves or thighs, no edema, no clonus, 1+ patellar reflexes    I/O last 3 completed shifts: In: 1618.2 [I.V.:1618.2]  Out: 3700 [Urine:3700]    Pertinent Labs:   Admission on 2020   Component Date Value Ref Range Status    Sodium 2020 137  136 - 145 mmol/L Final    Potassium 2020 4.6  3.5 - 5.1 mmol/L Final    Chloride 2020 102  99 - 110 mmol/L Final    CO2 2020 26  21 - 32 mmol/L Final    Anion Gap 2020 9  3 - 16 Final    Glucose 2020 111* 70 - 99 mg/dL Final    BUN 2020 18  7 - 20 mg/dL Final    CREATININE 2020 0.7  0.6 - 1.1 mg/dL Final    GFR Non- 2020 >60  >60 Final    Comment: >60 mL/min/1.73m2 EGFR, calc. for ages 25 and older using the  MDRD formula (not corrected for weight), is valid for stable  renal function.       GFR  2020 >60  >60 Final    Comment: Chronic Kidney Disease: less than 60 36.2 sec Final    Comment: Therapeutic range: 49.0 - 76.0 sec    Effective 11-19-19 9:00am EST  Please note reference ranges have  changed for PTT Testing.  Fibrinogen 09/23/2020 425* 200 - 397 mg/dL Final    Uric Acid, Serum 09/23/2020 5.4  2.6 - 6.0 mg/dL Final    Color, UA 09/23/2020 Yellow  Straw/Yellow Final    Clarity, UA 09/23/2020 Clear  Clear Final    Glucose, Ur 09/23/2020 Negative  Negative mg/dL Final    Bilirubin Urine 09/23/2020 Negative  Negative Final    Ketones, Urine 09/23/2020 Negative  Negative mg/dL Final    Specific [place], UA 09/23/2020 1.010  1.005 - 1.030 Final    Blood, Urine 09/23/2020 MODERATE* Negative Final    pH, UA 09/23/2020 6.0  5.0 - 8.0 Final    Protein, UA 09/23/2020 Negative  Negative mg/dL Final    Urobilinogen, Urine 09/23/2020 0.2  <2.0 E.U./dL Final    Nitrite, Urine 09/23/2020 Negative  Negative Final    Leukocyte Esterase, Urine 09/23/2020 MODERATE* Negative Final    Microscopic Examination 09/23/2020 YES   Final    Urine Type 09/23/2020 NotGiven   Final    Protein, Ur 09/23/2020 7.00  <12 mg/dL Final    Creatinine, Ur 09/23/2020 32.3  28.0 - 259.0 mg/dL Final    Protein/Creat Ratio 09/23/2020 0.2  mg/dL Final    No established reference range.     WBC, UA 09/23/2020 10-20* 0 - 5 /HPF Final    RBC, UA 09/23/2020 11-20* 0 - 4 /HPF Final    Epithelial Cells, UA 09/23/2020 6-10* 0 - 5 /HPF Final    Bacteria, UA 09/23/2020 1+* None Seen /HPF Final    WBC 09/23/2020 8.4  4.0 - 11.0 K/uL Final    RBC 09/23/2020 3.91* 4.00 - 5.20 M/uL Final    Hemoglobin 09/23/2020 12.1  12.0 - 16.0 g/dL Final    Hematocrit 09/23/2020 35.6* 36.0 - 48.0 % Final    MCV 09/23/2020 91.1  80.0 - 100.0 fL Final    MCH 09/23/2020 30.9  26.0 - 34.0 pg Final    MCHC 09/23/2020 33.9  31.0 - 36.0 g/dL Final    RDW 09/23/2020 12.8  12.4 - 15.4 % Final    Platelets 91/27/1458 284  135 - 450 K/uL Final    MPV 09/23/2020 8.6  5.0 - 10.5 fL Final    Uric Acid, Serum 09/23/2020 5.3  2.6 - 6.0 mg/dL Final    Sodium 09/23/2020 139  136 - 145 mmol/L Final    Potassium reflex Magnesium 09/23/2020 4.2  3.5 - 5.1 mmol/L Final    Chloride 09/23/2020 100  99 - 110 mmol/L Final    CO2 09/23/2020 29  21 - 32 mmol/L Final    Anion Gap 09/23/2020 10  3 - 16 Final    Glucose 09/23/2020 102* 70 - 99 mg/dL Final    BUN 09/23/2020 18  7 - 20 mg/dL Final    CREATININE 09/23/2020 0.7  0.6 - 1.1 mg/dL Final    GFR Non- 09/23/2020 >60  >60 Final    Comment: >60 mL/min/1.73m2 EGFR, calc. for ages 25 and older using the  MDRD formula (not corrected for weight), is valid for stable  renal function.  GFR  09/23/2020 >60  >60 Final    Comment: Chronic Kidney Disease: less than 60 ml/min/1.73 sq.m. Kidney Failure: less than 15 ml/min/1.73 sq.m. Results valid for patients 18 years and older.       Calcium 09/23/2020 8.6  8.3 - 10.6 mg/dL Final    Total Protein 09/23/2020 7.2  6.4 - 8.2 g/dL Final    Alb 09/23/2020 3.8  3.4 - 5.0 g/dL Final    Albumin/Globulin Ratio 09/23/2020 1.1  1.1 - 2.2 Final    Total Bilirubin 09/23/2020 <0.2  0.0 - 1.0 mg/dL Final    Alkaline Phosphatase 09/23/2020 113  40 - 129 U/L Final    ALT 09/23/2020 16  10 - 40 U/L Final    AST 09/23/2020 14* 15 - 37 U/L Final    Globulin 09/23/2020 3.4  g/dL Final    Magnesium 09/23/2020 4.40* 1.80 - 2.40 mg/dL Final    Magnesium 09/24/2020 5.60* 1.80 - 2.40 mg/dL Final    Magnesium 09/23/2020 1.90  1.80 - 2.40 mg/dL Final    WBC 09/24/2020 7.8  4.0 - 11.0 K/uL Final    RBC 09/24/2020 4.39  4.00 - 5.20 M/uL Final    Hemoglobin 09/24/2020 13.6  12.0 - 16.0 g/dL Final    Hematocrit 09/24/2020 39.7  36.0 - 48.0 % Final    MCV 09/24/2020 90.4  80.0 - 100.0 fL Final    MCH 09/24/2020 31.0  26.0 - 34.0 pg Final    MCHC 09/24/2020 34.3  31.0 - 36.0 g/dL Final    RDW 09/24/2020 13.2  12.4 - 15.4 % Final    Platelets 23/23/8522 308  135 - 450 K/uL Final    MPV 09/24/2020 8.6 5.0 - 10.5 fL Final    Neutrophils % 09/24/2020 69.9  % Final    Lymphocytes % 09/24/2020 23.1  % Final    Monocytes % 09/24/2020 3.8  % Final    Eosinophils % 09/24/2020 2.1  % Final    Basophils % 09/24/2020 1.1  % Final    Neutrophils Absolute 09/24/2020 5.5  1.7 - 7.7 K/uL Final    Lymphocytes Absolute 09/24/2020 1.8  1.0 - 5.1 K/uL Final    Monocytes Absolute 09/24/2020 0.3  0.0 - 1.3 K/uL Final    Eosinophils Absolute 09/24/2020 0.2  0.0 - 0.6 K/uL Final    Basophils Absolute 09/24/2020 0.1  0.0 - 0.2 K/uL Final    Sodium 09/24/2020 136  136 - 145 mmol/L Final    Potassium 09/24/2020 4.5  3.5 - 5.1 mmol/L Final    Chloride 09/24/2020 97* 99 - 110 mmol/L Final    CO2 09/24/2020 26  21 - 32 mmol/L Final    Anion Gap 09/24/2020 13  3 - 16 Final    Glucose 09/24/2020 180* 70 - 99 mg/dL Final    BUN 09/24/2020 10  7 - 20 mg/dL Final    CREATININE 09/24/2020 0.7  0.6 - 1.1 mg/dL Final    GFR Non- 09/24/2020 >60  >60 Final    Comment: >60 mL/min/1.73m2 EGFR, calc. for ages 25 and older using the  MDRD formula (not corrected for weight), is valid for stable  renal function.  GFR  09/24/2020 >60  >60 Final    Comment: Chronic Kidney Disease: less than 60 ml/min/1.73 sq.m. Kidney Failure: less than 15 ml/min/1.73 sq.m. Results valid for patients 18 years and older.       Calcium 09/24/2020 7.4* 8.3 - 10.6 mg/dL Final    Total Protein 09/24/2020 7.9  6.4 - 8.2 g/dL Final    Alb 09/24/2020 4.1  3.4 - 5.0 g/dL Final    Albumin/Globulin Ratio 09/24/2020 1.1  1.1 - 2.2 Final    Total Bilirubin 09/24/2020 <0.2  0.0 - 1.0 mg/dL Final    Alkaline Phosphatase 09/24/2020 129  40 - 129 U/L Final    ALT 09/24/2020 19  10 - 40 U/L Final    AST 09/24/2020 17  15 - 37 U/L Final    Globulin 09/24/2020 3.8  g/dL Final    Uric Acid, Serum 09/24/2020 5.7  2.6 - 6.0 mg/dL Final    Magnesium 09/24/2020 6.30* 1.80 - 2.40 mg/dL Final       Assessment/Plan:  [employer]

## 2020-09-25 VITALS
TEMPERATURE: 98.3 F | RESPIRATION RATE: 16 BRPM | DIASTOLIC BLOOD PRESSURE: 74 MMHG | HEART RATE: 74 BPM | SYSTOLIC BLOOD PRESSURE: 123 MMHG

## 2020-09-25 LAB
A/G RATIO: 1.2 (ref 1.1–2.2)
ALBUMIN SERPL-MCNC: 4.2 G/DL (ref 3.4–5)
ALP BLD-CCNC: 119 U/L (ref 40–129)
ALT SERPL-CCNC: 15 U/L (ref 10–40)
ANION GAP SERPL CALCULATED.3IONS-SCNC: 10 MMOL/L (ref 3–16)
AST SERPL-CCNC: 10 U/L (ref 15–37)
BASOPHILS ABSOLUTE: 0.1 K/UL (ref 0–0.2)
BASOPHILS RELATIVE PERCENT: 0.7 %
BILIRUB SERPL-MCNC: 0.3 MG/DL (ref 0–1)
BUN BLDV-MCNC: 18 MG/DL (ref 7–20)
CALCIUM SERPL-MCNC: 8.8 MG/DL (ref 8.3–10.6)
CHLORIDE BLD-SCNC: 101 MMOL/L (ref 99–110)
CO2: 27 MMOL/L (ref 21–32)
CREAT SERPL-MCNC: 0.7 MG/DL (ref 0.6–1.1)
EOSINOPHILS ABSOLUTE: 0.2 K/UL (ref 0–0.6)
EOSINOPHILS RELATIVE PERCENT: 2.9 %
GFR AFRICAN AMERICAN: >60
GFR NON-AFRICAN AMERICAN: >60
GLOBULIN: 3.5 G/DL
GLUCOSE BLD-MCNC: 93 MG/DL (ref 70–99)
HCT VFR BLD CALC: 37.8 % (ref 36–48)
HEMOGLOBIN: 12.8 G/DL (ref 12–16)
LYMPHOCYTES ABSOLUTE: 2.7 K/UL (ref 1–5.1)
LYMPHOCYTES RELATIVE PERCENT: 33.8 %
MCH RBC QN AUTO: 31.3 PG (ref 26–34)
MCHC RBC AUTO-ENTMCNC: 34 G/DL (ref 31–36)
MCV RBC AUTO: 92.1 FL (ref 80–100)
MONOCYTES ABSOLUTE: 0.4 K/UL (ref 0–1.3)
MONOCYTES RELATIVE PERCENT: 5.2 %
NEUTROPHILS ABSOLUTE: 4.6 K/UL (ref 1.7–7.7)
NEUTROPHILS RELATIVE PERCENT: 57.4 %
PDW BLD-RTO: 13 % (ref 12.4–15.4)
PLATELET # BLD: 292 K/UL (ref 135–450)
PMV BLD AUTO: 8.8 FL (ref 5–10.5)
POTASSIUM SERPL-SCNC: 4.9 MMOL/L (ref 3.5–5.1)
RBC # BLD: 4.1 M/UL (ref 4–5.2)
SODIUM BLD-SCNC: 138 MMOL/L (ref 136–145)
TOTAL PROTEIN: 7.7 G/DL (ref 6.4–8.2)
WBC # BLD: 7.9 K/UL (ref 4–11)

## 2020-09-25 PROCEDURE — 36415 COLL VENOUS BLD VENIPUNCTURE: CPT

## 2020-09-25 PROCEDURE — 80053 COMPREHEN METABOLIC PANEL: CPT

## 2020-09-25 PROCEDURE — 99238 HOSP IP/OBS DSCHRG MGMT 30/<: CPT | Performed by: OBSTETRICS & GYNECOLOGY

## 2020-09-25 PROCEDURE — 6370000000 HC RX 637 (ALT 250 FOR IP): Performed by: OBSTETRICS & GYNECOLOGY

## 2020-09-25 PROCEDURE — 96361 HYDRATE IV INFUSION ADD-ON: CPT

## 2020-09-25 PROCEDURE — 85025 COMPLETE CBC W/AUTO DIFF WBC: CPT

## 2020-09-25 RX ORDER — NIFEDIPINE 30 MG/1
30 TABLET, FILM COATED, EXTENDED RELEASE ORAL DAILY
Qty: 30 TABLET | Refills: 1 | Status: SHIPPED | OUTPATIENT
Start: 2020-09-26

## 2020-09-25 RX ORDER — BUTALBITAL, ACETAMINOPHEN AND CAFFEINE 50; 325; 40 MG/1; MG/1; MG/1
1 TABLET ORAL EVERY 4 HOURS PRN
Qty: 20 TABLET | Refills: 0 | Status: SHIPPED | OUTPATIENT
Start: 2020-09-25

## 2020-09-25 RX ADMIN — NIFEDIPINE 30 MG: 30 TABLET, FILM COATED, EXTENDED RELEASE ORAL at 08:20

## 2020-09-25 RX ADMIN — DOCUSATE SODIUM 100 MG: 100 CAPSULE, LIQUID FILLED ORAL at 08:21

## 2020-09-25 NOTE — PROGRESS NOTES
Department of Obstetrics and Gynecology  Labor and Delivery  Attending Post Partum Progress Note      SUBJECTIVE:  Campos Davis is a 31 y.o. [id number] status-post   at 38+3 readmitted with preeclampsia with severe features. The pregnancy was complicated by preeclampsia, prior  delivery, trichomonas, amniotic band, hx  delivery, anemia, tobacco use, echogenic focus, hx of heroin abuse.      Patient is doing well this morning. Headache resolved last night with Fioricet and caffeine. History is positive for migraines. Current headaches are different. Denies vision changes and RUQ pain. Denies fever, chills, chest pain, nausea, vomiting, diarrhea, constipation, dysuria and hematuria. Denies significant vaginal bleeding.       OBJECTIVE:      Vitals:  BP (!) 107/59   Pulse 64   Temp 98.4 °F (36.9 °C) (Oral)   Resp 16   LMP 2019   Blood pressures:  107-143/59-93  CONSTITUTIONAL:  awake, alert, cooperative, no apparent distress, and appears stated age  LUNGS:  No increased work of breathing, good air exchange, clear to auscultation bilaterally, no crackles or wheezing  CARDIOVASCULAR:  normal S1 and S2  ABDOMEN:  soft, non-distended and non-tender  MUSCULOSKELETAL:  full range of motion noted  EXTREMITIES: No C/C/E  NEUROLOGIC:  Mental Status Exam:  Level of Alertness:   awake  Orientation:   person, place, time  Memory:   normal  Fund of Knowledge:  normal  Attention/Concentration:  normal  Language:  normal  SKIN:  normal skin color, texture, turgor    DATA:    2020  7:47 AM - [facility] Incoming Lab Results From Soft (Epic Adt)     Component  Value  Ref Range & Units  Status  Collected  Lab    Sodium  138  136 - 145 mmol/L  Final  2020  6:43 AM  [facility] Lab    Potassium  4.9  3.5 - 5.1 mmol/L  Final  2020  6:43 AM  [facility] Lab    Chloride  101  99 - 110 mmol/L  Final  2020  6:43 AM  [facility] Lab    CO2  27  21 - 32 mmol/L by: [address] Lab   9/25/2020  7:31 AM - [facility] Incoming Lab Results From Soft (Epic Adt)     Component  Value  Ref Range & Units  Status  Collected  Lab    WBC  7.9  4.0 - 11.0 K/uL  Final  09/25/2020  6:43 AM  [facility] Lab    RBC  4.10  4.00 - 5.20 M/uL  Final  09/25/2020  6:43 AM  [facility] Lab    Hemoglobin  12.8  12.0 - 16.0 g/dL  Final  09/25/2020  6:43 AM  [facility] Lab    Hematocrit  37.8  36.0 - 48.0 %  Final  09/25/2020  6:43 AM  [facility] Lab    MCV  92.1  80.0 - 100.0 fL  Final  09/25/2020  6:43 AM  [facility]    [facility]  31.3  26.0 - 34.0 pg  Final  09/25/2020  6:43 AM  [facility] Lab    MCHC  34.0  31.0 - 36.0 g/dL  Final  09/25/2020  6:43 AM  [facility]    RDW  13.0  12.4 - 15.4 %  Final  09/25/2020  6:43 AM  [facility] Lab    Platelets  019  728 - 450 K/uL  Final  09/25/2020  6:43 AM  [address] Lab    MPV  8.8  5.0 - 10.5 fL  Final  09/25/2020  6:43 AM  [facility]    Neutrophils %  57.4  %  Final  09/25/2020  6:43 AM  [facility]    Lymphocytes %  33.8  %  Final  09/25/2020  6:43 AM  [address] Lab    Monocytes %  5.2  %  Final  09/25/2020  6:43 AM  [facility]    Eosinophils %  2.9  %  Final  09/25/2020  6:43 AM  [facility]    Basophils %  0.7  %  Final  09/25/2020  6:43 AM  [facility]    Neutrophils Absolute  4.6  1.7 - 7.7 K/uL  Final  09/25/2020  6:43 AM  [facility] Lab    Lymphocytes Absolute  2.7  1.0 - 5.1 K/uL  Final  09/25/2020  6:43 AM  [facility] Lab    Monocytes Absolute  0.4  0.0 - 1.3 K/uL  Final  09/25/2020  6:43 AM  [facility] Lab    Eosinophils Absolute  0.2  0.0 - 0.6 K/uL  Final  09/25/2020  6:43 AM  [facility] Lab    Basophils Absolute  0.1  0.0 - 0.2 K/uL  Final  09/25/2020  6:43 AM  [facility] Lab    Testing Performed By Lab - Abbreviation  Name  Director  Address  Valid Date Range    79-ZL- [id number] [place] [facility]  Rowena Morin M.D.  [address] [facility] [id number]  17 [id number]-Present     Assessment/Plan:  Torrie Guerin is V 84 y.o.  s/p  at 38w3d, POD #13     1. Preeclampsia with severe features     - Severe range BP on admission, however labile in nature and most recently normotensive     - Continue PO procardia 30mg daily and titrate as needed     - s/p magnesium x 24hrs, discontinued last evening     - Headache improved     - HELLP labs stable, continue to monitor     - Routine postpartum care - see orders     Dispo: home today    Antonella Lowe D.O.

## 2020-09-25 NOTE — PLAN OF CARE
Problem: Coping:  Goal: Coping behaviors will improve  Description: Coping behaviors will improve  Outcome: Met This Shift  Goal: Ability to verbalize feelings will improve  Description: Ability to verbalize feelings will improve  Outcome: Met This Shift     Problem: Fluid Volume:  Goal: Will maintain adequate fluid volume  Description: Will maintain adequate fluid volume  Outcome: Met This Shift  Goal: Ability to achieve and maintain adequate urine output will improve  Description: Ability to achieve and maintain adequate urine output will improve  Outcome: Met This Shift  Goal: Peripheral tissue perfusion will improve  Description: Peripheral tissue perfusion will improve  Outcome: Met This Shift     Problem: Physical Regulation:  Goal: Risk for medication side effects will decrease  Description: Risk for medication side effects will decrease  Outcome: Met This Shift  Goal: Will remain free of preeclampsia complications  Description: Will remain free of preeclampsia complications  Outcome: Met This Shift  Goal: Signs of adequate cerebral perfusion will increase  Description: Signs of adequate cerebral perfusion will increase  Outcome: Met This Shift     Problem: Sensory:  Goal: Ability to compensate for vision loss will improve  Description: Ability to compensate for vision loss will improve  Outcome: Met This Shift     Problem: Pain:  Goal: Pain level will decrease  Description: Pain level will decrease  Outcome: Met This Shift  Goal: Control of acute pain  Description: Control of acute pain  Outcome: Met This Shift  Goal: Control of chronic pain  Description: Control of chronic pain  Outcome: Met This Shift

## 2020-09-25 NOTE — DISCHARGE SUMMARY
Discharge Summary    Date:2020        Patient Lev Needs     YOB: 1990     Age:30 y.o. Admit Date:2020   Admission Condition:stable   Discharged Condition:stable  Discharge Date: 20     Discharge Diagnoses   1. Postpartum pre-eclampsia with severe features  2. Tobacco use  3. cephalgia  4. S/P  postpartum day #13  Hospital Stay       Consultants:   None    Treatments:   Magnesium sulfate, anti-hypertensives    Significant Diagnostic Studies:   Recent Labs:  CBC:   Lab Results   Component Value Date    WBC 7.9 2020    RBC 4.10 2020    HGB 12.8 2020    HCT 37.8 2020    MCV 92.1 2020    MCH 31.3 2020    MCHC 34.0 2020    RDW 13.0 2020     2020     CMP:    Lab Results   Component Value Date    GLUCOSE 93 2020     2020    K 4.9 2020    K 4.2 2020     2020    CO2 27 2020    BUN 18 2020    CREATININE 0.7 2020    ANIONGAP 10 2020    ALKPHOS 119 2020    ALT 15 2020    AST 10 2020    BILITOT 0.3 2020    LABALBU 4.2 2020    LABGLOM >60 2020    GFRAA >60 2020    PROT 7.7 2020    CALCIUM 8.8 2020       Radiology Last 7 Days:  No results found. Discharge Plan   Disposition: Home    Provider Follow-Up:   7-10 days [facility]    Hospital/Incidental Findings Requiring Follow-Up:  Elevated blood pressures    Patient Instructions   Diet: regular diet    Activity: No heavy lifting or driving x 5-7 days. Pelvic rest x 6-8 weeks. Other Instructions:   Call for increased pain, significant vaginal bleeding or temperature greater than 101 degrees F.          Discharge Medications         Medication List      ASK your doctor about these medications    Blood Pressure Kit  1 Device by Does not apply route once for 1 dose     docusate 100 MG Caps  Commonly known as:  COLACE, DULCOLAX  Take 100 mg by mouth 2 times daily     ferrous sulfate 325 (65 Fe) MG tablet  Commonly known as:  IRON 325  Take 1 tablet by mouth daily (with breakfast)     gabapentin 100 MG capsule  Commonly known as:  NEURONTIN     HYDROcodone-acetaminophen 5-325 MG per tablet  Commonly known as:  NORCO     ibuprofen 800 MG tablet  Commonly known as:  ADVIL;MOTRIN  Take 1 tablet by mouth every 8 hours     Prenatal Plus 27-1 MG Tabs  Take 1 tablet by mouth daily            Electronically signed by Colonel Terry DO on 9/25/20 at 8:31 AM EDT

## 2020-09-25 NOTE — PROGRESS NOTES
Patient discharged in stable condition after reviewing all paperwork. Patient states she will make a follow up appointment with Dr. Glo Medina office for next Friday. Patient has blood pressure cuff at home and states she will continue taking her BP's. New prescriptions reviewed with patient. All questions answered.

## 2020-09-28 PROBLEM — O41.8X20 AMNIOTIC BAND IN SECOND TRIMESTER: Status: RESOLVED | Noted: 2020-04-22 | Resolved: 2020-09-28

## 2020-09-28 PROBLEM — O13.3 GESTATIONAL HYPERTENSION, THIRD TRIMESTER: Status: RESOLVED | Noted: 2020-09-01 | Resolved: 2020-09-28

## 2020-09-28 PROBLEM — O99.321 SUBOXONE MAINTENANCE TREATMENT COMPLICATING PREGNANCY, ANTEPARTUM, FIRST TRIMESTER (HCC): Status: RESOLVED | Noted: 2020-02-08 | Resolved: 2020-09-28

## 2020-09-28 PROBLEM — O35.BXX0 ECHOGENIC FOCUS OF HEART OF FETUS AFFECTING ANTEPARTUM CARE OF MOTHER: Status: RESOLVED | Noted: 2020-05-17 | Resolved: 2020-09-28

## 2020-09-28 PROBLEM — O99.333 MATERNAL TOBACCO USE IN THIRD TRIMESTER: Status: RESOLVED | Noted: 2020-02-08 | Resolved: 2020-09-28

## 2020-09-28 PROBLEM — O41.8X10 SUBCHORIONIC HEMORRHAGE IN FIRST TRIMESTER: Status: RESOLVED | Noted: 2020-02-08 | Resolved: 2020-09-28

## 2020-09-28 PROBLEM — O46.8X1 SUBCHORIONIC HEMORRHAGE IN FIRST TRIMESTER: Status: RESOLVED | Noted: 2020-02-08 | Resolved: 2020-09-28

## 2020-09-28 PROBLEM — O09.899 HISTORY OF PRETERM DELIVERY, CURRENTLY PREGNANT: Status: RESOLVED | Noted: 2020-03-07 | Resolved: 2020-09-28

## 2020-09-28 PROBLEM — O23.41 URINARY TRACT INFECTION IN MOTHER DURING FIRST TRIMESTER OF PREGNANCY: Status: RESOLVED | Noted: 2020-02-08 | Resolved: 2020-09-28

## 2020-09-28 PROBLEM — Z34.93 PRENATAL CARE IN THIRD TRIMESTER: Status: RESOLVED | Noted: 2020-02-08 | Resolved: 2020-09-28

## 2020-09-28 PROBLEM — F11.20 SUBOXONE MAINTENANCE TREATMENT COMPLICATING PREGNANCY, ANTEPARTUM, FIRST TRIMESTER (HCC): Status: RESOLVED | Noted: 2020-02-08 | Resolved: 2020-09-28

## 2020-09-28 PROBLEM — O99.013 ANEMIA DURING PREGNANCY IN THIRD TRIMESTER: Status: RESOLVED | Noted: 2020-08-08 | Resolved: 2020-09-28

## 2020-09-28 PROBLEM — Z86.19 HISTORY OF TRICHOMONAL VAGINITIS: Status: ACTIVE | Noted: 2020-02-08

## 2020-09-28 ASSESSMENT — ENCOUNTER SYMPTOMS
CONSTIPATION: 0
ABDOMINAL PAIN: 0
VOMITING: 0
SHORTNESS OF BREATH: 0
DIARRHEA: 0
NAUSEA: 0
RESPIRATORY NEGATIVE: 1
GASTROINTESTINAL NEGATIVE: 1

## 2020-09-28 NOTE — PROGRESS NOTES
Subjective:      Patient ID: Susan Méndez is a 31 y.o. female. HPI  Susan Méndez is a 32 y/o [id number] who presents for postpartum visit. Patient is 1 week s/p successful  at 38w3d. Pregnancy has been complicated by GHTN/preeclampsia, prior  delivery (for breech presentation), trichomonas, amniotic band, hx  delivery (17 (OH)P throughout pregnancy, anemia, tobacco use, echogenic focus, hx heroin abuse. Patient has not felt well for the past 24-48 hours. Has notes daily headaches ranging from mild to severe. Woke up with headache this morning. Denies vision changes and RUQ pain. Denies fever, chills, chest pain shortness of breath, nausea, vomiting, diarrhea constipation, dysuria and hematuria. Has noted a mild vaginal odor, denies irritation and pruritis. No current BP medications  Home blood pressure yesterday: 162/80, high: 172/102. Patient is bottle feeding. Review of Systems   Constitutional: Negative. Negative for activity change, appetite change, chills, fatigue, fever and unexpected weight change. Eyes: Negative for visual disturbance. Respiratory: Negative. Negative for shortness of breath. Cardiovascular: Negative. Negative for chest pain. Gastrointestinal: Negative. Negative for abdominal pain, constipation, diarrhea, nausea and vomiting. Endocrine: Negative for cold intolerance and heat intolerance. Genitourinary: Positive for vaginal bleeding and vaginal discharge (odor). Negative for difficulty urinating, dysuria, hematuria, pelvic pain and vaginal pain. Skin: Negative. Neurological: Positive for headaches. Hematological: Does not bruise/bleed easily. Psychiatric/Behavioral: Negative. Negative for dysphoric mood and suicidal ideas. The patient is not nervous/anxious. Objective:   Physical Exam  Vitals signs and nursing note reviewed. Exam conducted with a chaperone present.    Constitutional:       General: She is not in acute distress. Appearance: She is well-developed. She is not diaphoretic. HENT:      Head: Normocephalic and atraumatic. Pulmonary:      Effort: Pulmonary effort is normal.   Abdominal:      General: There is no distension. Palpations: Abdomen is soft. Tenderness: There is no abdominal tenderness. There is no guarding. Genitourinary:     General: Normal vulva. Exam position: Lithotomy position. Labia:         Right: No rash, tenderness, lesion or injury. Left: No rash, tenderness, lesion or injury. Vagina: Signs of injury present. No foreign body. Vaginal discharge and tenderness present. No erythema, bleeding or lesions. Comments: Perineum healing well. No apparent signs of infection or compromise. Vaginal pathogens testing performed. Skin:     General: Skin is warm and dry. Neurological:      Mental Status: She is alert and oriented to person, place, and time. Psychiatric:         Behavior: Behavior normal.         Thought Content: Thought content normal.         Judgment: Judgment normal.         Assessment:       Diagnosis Orders   1. Postpartum care following vaginal delivery     2. Preeclampsia in postpartum period     3. Vaginal discharge  VAGINAL PATHOGENS PROBE *A   4. Vaginal odor  VAGINAL PATHOGENS PROBE *A   5. History of heroin abuse ([facility] [address]     6. History of trichomonal vaginitis     7. Hx of  section     8. History of loop electrical excision procedure (LEEP)     9. History of abnormal cervical Pap smear     10. S/P D&C (status post dilation and curettage)             Plan:      Patient transported to L&D for serial blood pressures and lab work, probable admission for postpartum pre-eclampsia.            Bandar Lowe DO

## 2022-10-10 ENCOUNTER — OFFICE VISIT (OUTPATIENT)
Dept: OBGYN CLINIC | Age: 32
End: 2022-10-10
Payer: COMMERCIAL

## 2022-10-10 VITALS
TEMPERATURE: 98.2 F | SYSTOLIC BLOOD PRESSURE: 124 MMHG | DIASTOLIC BLOOD PRESSURE: 80 MMHG | HEART RATE: 100 BPM | WEIGHT: 124.6 LBS | BODY MASS INDEX: 17.88 KG/M2

## 2022-10-10 DIAGNOSIS — R87.610 ASCUS WITH POSITIVE HIGH RISK HPV CERVICAL: ICD-10-CM

## 2022-10-10 DIAGNOSIS — Z98.891 HISTORY OF VBAC: ICD-10-CM

## 2022-10-10 DIAGNOSIS — Z20.2 POSSIBLE EXPOSURE TO STD: ICD-10-CM

## 2022-10-10 DIAGNOSIS — Z32.01 POSITIVE PREGNANCY TEST: ICD-10-CM

## 2022-10-10 DIAGNOSIS — N91.2 AMENORRHEA: ICD-10-CM

## 2022-10-10 DIAGNOSIS — Z98.890 HISTORY OF LOOP ELECTRICAL EXCISION PROCEDURE (LEEP): ICD-10-CM

## 2022-10-10 DIAGNOSIS — R87.810 ASCUS WITH POSITIVE HIGH RISK HPV CERVICAL: ICD-10-CM

## 2022-10-10 DIAGNOSIS — Z80.3 FAMILY HISTORY OF BREAST CANCER: ICD-10-CM

## 2022-10-10 DIAGNOSIS — Z98.891 HX OF CESAREAN SECTION: ICD-10-CM

## 2022-10-10 DIAGNOSIS — F12.90 MARIJUANA USE: ICD-10-CM

## 2022-10-10 DIAGNOSIS — Z12.4 PAP SMEAR FOR CERVICAL CANCER SCREENING: ICD-10-CM

## 2022-10-10 DIAGNOSIS — F11.11 HISTORY OF HEROIN ABUSE (HCC): ICD-10-CM

## 2022-10-10 DIAGNOSIS — Z32.01 POSITIVE PREGNANCY TEST: Primary | ICD-10-CM

## 2022-10-10 DIAGNOSIS — Z87.42 HX OF ABNORMAL CERVICAL PAP SMEAR: ICD-10-CM

## 2022-10-10 DIAGNOSIS — Z86.19 HISTORY OF TRICHOMONAL VAGINITIS: ICD-10-CM

## 2022-10-10 DIAGNOSIS — Z01.419 WOMEN'S ANNUAL ROUTINE GYNECOLOGICAL EXAMINATION: Primary | ICD-10-CM

## 2022-10-10 LAB
CRL: NORMAL
SAC DIAMETER: NORMAL

## 2022-10-10 PROCEDURE — 99999 PR OFFICE/OUTPT VISIT,PROCEDURE ONLY: CPT | Performed by: OBSTETRICS & GYNECOLOGY

## 2022-10-10 PROCEDURE — 76801 OB US < 14 WKS SINGLE FETUS: CPT | Performed by: OBSTETRICS & GYNECOLOGY

## 2022-10-10 PROCEDURE — 99395 PREV VISIT EST AGE 18-39: CPT | Performed by: OBSTETRICS & GYNECOLOGY

## 2022-10-10 PROCEDURE — 81025 URINE PREGNANCY TEST: CPT | Performed by: OBSTETRICS & GYNECOLOGY

## 2022-10-10 NOTE — PROGRESS NOTES
[facility]   Annual Exam     CC:   Chief Complaint   Patient presents with    Amenorrhea        HPI:  34 y.o. [id number] presents for her gynecologic exam. She complains of Amenorrhea. Patient's last menstrual period was 2022 (approximate). Amberly Loya Has had a (+) home pregnancy test. Denies any VB / Cramping since that time. Denies significant nausea/vomiting. On prenatal vitamin. Previous pregnancies: G4 --  at 38+3 EGA, IOL for Pre Eclampsia, Progesterone for hx PTD. G3-- PLTCS due to Breech at [address]. G2 --  , 32wk. G1 -- Term  at 39 wks. GYN Hx: Maternal GM - with breast cancer, Paternal GM - unknown GYN CA. Medical Hx: Hx of opiate abuse, controlled with suboxone, now on gabapentin. Has self weaned. Has not used medication in last 4 days. Was on a very low dose (<1mg). States symptoms are controlled / denies signs of withdrawal.     Screening:  Last pap smear: ASCUS HPV OT 2020  History of abnormal pap smears: Y  STI History: Y      Review of Systems:   Review of Systems   Constitutional:  Negative for activity change, appetite change and fatigue. Eyes:  Negative for photophobia and visual disturbance. Respiratory:  Negative for shortness of breath. Cardiovascular:  Negative for chest pain, palpitations and leg swelling. Gastrointestinal:  Positive for nausea. Negative for abdominal pain and vomiting. Genitourinary:  Negative for difficulty urinating.        (+) absence of Menses   (+) breast tenderness    Skin:  Negative for color change. Neurological:  Negative for dizziness and numbness. Hematological:  Negative for adenopathy. Does not bruise/bleed easily. Psychiatric/Behavioral:  Negative for behavioral problems. The patient is not nervous/anxious.       Primary Care Physician: Helen Carlson MD    Obstetric History  OB History    Para Term  AB Living   5 4 3 1   4   SAB IAB Ectopic Molar Multiple Live Births             4      # Outcome Date GA Lbr Trey/2nd Weight Sex Delivery Anes PTL Lv   5 Current            4 Term 20 38w3d 02:12 / 00:10 7 lb 0.4 oz (3.187 kg) M Vag-Spont EPI N DOLORES      Birth Comments: godwin   3 Term 10/31/11 40w0d  6 lb 1 oz (2.75 kg) M CS-Unspec  N DOLORES      Complications: Breech presentation at birth   2  08 32w0d  4 lb 6 oz (1.984 kg) M Vag-Spont OTHER Y DOLORES   1 Term 07 39w0d  6 lb 1 oz (2.75 kg) F Vag-Spont EPI N DOLORES       Gynecologic History  Menstrual History:  Menarche: early teens  LMP: Patient's last menstrual period was 2022 (approximate). Menstrual Period: regular    Sexual History:  Contraception: see above  Currently IS sexually active  Denies sexual problems    Medical History:  Past Medical History:   Diagnosis Date    Abnormal Pap smear of cervix     Anemia during pregnancy in third trimester 2020    Depression     Fibromyalgia     Gestational hypertension, third trimester 2020    Heroin abuse ([facility] [address]     Scarlet fever     Strep throat     Traumatic injury during pregnancy, third trimester        Medications:  Current Outpatient Medications   Medication Sig Dispense Refill    gabapentin (NEURONTIN) 100 MG capsule Take 100 mg by mouth daily. butalbital-acetaminophen-caffeine (FIORICET, ESGIC) -40 MG per tablet Take 1 tablet by mouth every 4 hours as needed for Headaches (Patient not taking: Reported on 10/10/2022) 20 tablet 0    NIFEdipine (ADALAT CC) 30 MG extended release tablet Take 1 tablet by mouth daily (Patient not taking: Reported on 10/10/2022) 30 tablet 1    HYDROcodone-acetaminophen (NORCO) 5-325 MG per tablet Take 1 tablet by mouth every 6 hours as needed for Pain.  (Patient not taking: Reported on 10/10/2022)      ibuprofen (ADVIL;MOTRIN) 800 MG tablet Take 1 tablet by mouth every 8 hours (Patient not taking: Reported on 10/10/2022) 30 tablet 0    ferrous sulfate (IRON 325) 325 (65 Fe) MG tablet Take 1 tablet by mouth daily (with breakfast) (Patient not taking: Reported on 10/10/2022) 30 tablet 0    docusate sodium (COLACE, DULCOLAX) 100 MG CAPS Take 100 mg by mouth 2 times daily (Patient not taking: Reported on 10/10/2022) 60 capsule 0    Blood Pressure KIT 1 Device by Does not apply route once for 1 dose 1 kit 0    Prenatal Vit-Fe Fumarate-FA (PRENATAL PLUS) 27-1 MG TABS Take 1 tablet by mouth daily (Patient not taking: Reported on 10/10/2022) 30 tablet 3     No current facility-administered medications for this visit. Surgical History:  Past Surgical History:   Procedure Laterality Date     SECTION      DILATION AND CURETTAGE      TONSILLECTOMY AND ADENOIDECTOMY         Allergies:  No Known Allergies    Family History:  Family History   Problem Relation Age of Onset    Cancer Maternal Grandmother     High Blood Pressure Maternal Grandmother        Social History:  Social History     Socioeconomic History    Marital status: Single   Tobacco Use    Smoking status: Every Day     Packs/day: 0.50     Years: 15.00     Pack years: 7.50     Types: Cigarettes    Smokeless tobacco: Never   Vaping Use    Vaping Use: Never used   Substance and Sexual Activity    Alcohol use: No    Drug use: Not Currently     Types: Marijuana [place])     Comment: prio heroin, tring to wean off    Sexual activity: Yes     Partners: Male       Objective: Body mass index is 17.88 kg/m². /80 (Site: Left Upper Arm, Position: Sitting, Cuff Size: Small Adult)   Pulse 100   Temp 98.2 °F (36.8 °C) (Infrared)   Wt 124 lb 9.6 oz (56.5 kg)   LMP 2022 (Approximate)   BMI 17.88 kg/m²   General: Alert, well appearing, no acute distress  Head: Normocephalic, atraumatic  Mouth: Mucous membranes moist, pharynx normal without lesions  Thyroid: No thyromegaly or masses present   Breasts: Symmetric, non-tender to palpation, no skin changes, palpable axillary lymph nodes or masses noted  Lungs: Respiratory effort normal   CV: Regular rate.   Abdomen: Soft, nontender, nondistended Pelvic:   External: normal appearing genitalia without masses, tenderness or lesions  Vagina: moist, pink mucosa, without abnormal discharge or lesions  Cervix: no masses or lesions visualized, no cervical motion tenderness  Uterus: mobile, midline, no masses palpated  Adnexa: mobile, non-tender to palpation, without masses  Rectovaginal: deferred  Extremities: No redness or tenderness, neg Nakita's sign  Skin: Well perfused, normal coloration and turgor, no lesions or rashes visualized  Neuro: Alert, oriented, normal speech, no focal deficits, moves extremities appropriately  Osteopathic: No TART changes    Imaging:  OBSTETRIC ULTRASOUND--1ST TRIMESTER    DATE: 10/10/2022    PHYSICIAN: LEATHA Rouse.     SONOGRAPHER: Zach Vergara RDMS    INDICATION: Amenorrhea    TYPE OF SCAN:  abdominal    FINDINGS:      The cul de sac is normal.  The cervix is normal.  The uterus is gravid. The uterus measures 12.82 cm x 9.99 cm x 8.96 cm. No uterine anomalies are evident. The right ovary is present. The right ovary measures 4.23 cm x 3.43 cm x 2.88 cm. The left ovary is not visualized. There is a single intrauterine pregnancy identified. A fetal pole is noted with a CRL measuring 5.20 cm, consistent with gestational age of 9weeks and 6days and EDC of 04/25/2023. There is a 2 day discrepancy when compared with the gestational age of 12weeks and 1days and EDC of 04/23/2023 set by [facility] (07/17/2022). Yolk sac is present and measures . 98 cm. Fetal cardiac activity is present at 160 bpm.     IMPRESSION:    Single IUP with cardiac activity. Consistent with a gestational age of 12weeks and 1days and EDC of 04/23/2023 set by [facility] (07/17/2022). Assessment/Plan:  34 y.o. [id number] presenting for her annual exam with (+) preg test:     Diagnosis Orders   1. Women's annual routine gynecological examination        2. Pap smear for cervical cancer screening  PAP SMEAR      3.  Possible exposure to STD  Vaginal Pathogens Probes *A    C.trachomatis N.gonorrhoeae DNA      4. Amenorrhea        5. Positive pregnancy test  POCT urine pregnancy    Culture, Urine    Urinalysis with Microscopic      6. Hx of  section        7. History of         8. Family history of breast cancer        9. Hx of abnormal cervical Pap smear      ASCUS/HPV OTH       10. History of heroin abuse ([address]        11. Marijuana use        12. History of trichomonal vaginitis        13. History of loop electrical excision procedure (LEEP)        14. ASCUS with positive high risk HPV cervical        15. Preeclampsia in postpartum period           - reviewed US results, consistent with a gestational age of 16 weeks and 1 days and EDC of 2023 set by [facility] (2022). - continue prenatal vitamin  - Hx Opiate Abuse: reviewed transitioning to Subutex with assistance of Dr. Alessandro Marie, pt is okay with self weaning for now   - Fam Hx of GYN Cancer / Hx of CS: Pt is interested in RLTCS with Bilateral Salpingectomy -- have requested approval from ethics, needs to sign paperwork   - Hx abnormal PAP: Repeat pending, may need colpo in pregnancy   - reviewed genetic screening options -- likely no, but undecided  - apt schedule, office policies and prenatal folder reviewed, questions or concerns addressed   - early OB return precautions reviewed   - pt aware that I will be on leave soon, she was counseled on FU w/ my partners and office policies     Follow Up  - Will call patient with results   -Return in about 4 weeks (around 2022) for Initial Prenatal Visit.     Kathlee Merlin,

## 2022-10-11 LAB
BACTERIA: ABNORMAL /HPF
BILIRUBIN URINE: NEGATIVE
BLOOD, URINE: NEGATIVE
C TRACH DNA GENITAL QL NAA+PROBE: NEGATIVE
CANDIDA SPECIES, DNA PROBE: ABNORMAL
CLARITY: ABNORMAL
COLOR: YELLOW
EPITHELIAL CELLS, UA: 1 /HPF (ref 0–5)
GARDNERELLA VAGINALIS, DNA PROBE: ABNORMAL
GLUCOSE URINE: 100 MG/DL
HYALINE CASTS: 0 /LPF (ref 0–8)
KETONES, URINE: ABNORMAL MG/DL
LEUKOCYTE ESTERASE, URINE: NEGATIVE
MICROSCOPIC EXAMINATION: YES
N. GONORRHOEAE DNA: NEGATIVE
NITRITE, URINE: NEGATIVE
PH UA: 6 (ref 5–8)
PROTEIN UA: NEGATIVE MG/DL
RBC UA: 1 /HPF (ref 0–4)
SPECIFIC GRAVITY UA: 1.02 (ref 1–1.03)
TRICHOMONAS VAGINALIS DNA: ABNORMAL
URINE TYPE: ABNORMAL
UROBILINOGEN, URINE: 0.2 E.U./DL
WBC UA: 1 /HPF (ref 0–5)

## 2022-10-12 LAB
HPV COMMENT: ABNORMAL
HPV TYPE 16: NOT DETECTED
HPV TYPE 18: NOT DETECTED
HPVOH (OTHER TYPES): DETECTED
URINE CULTURE, ROUTINE: NORMAL

## 2022-10-23 ASSESSMENT — ENCOUNTER SYMPTOMS
PHOTOPHOBIA: 0
SHORTNESS OF BREATH: 0
VOMITING: 0
ABDOMINAL PAIN: 0
COLOR CHANGE: 0
NAUSEA: 1

## 2022-11-10 ENCOUNTER — INITIAL PRENATAL (OUTPATIENT)
Dept: OBGYN CLINIC | Age: 32
End: 2022-11-10
Payer: COMMERCIAL

## 2022-11-10 ENCOUNTER — OFFICE VISIT (OUTPATIENT)
Dept: OBGYN CLINIC | Age: 32
End: 2022-11-10
Payer: COMMERCIAL

## 2022-11-10 VITALS
HEART RATE: 80 BPM | DIASTOLIC BLOOD PRESSURE: 66 MMHG | SYSTOLIC BLOOD PRESSURE: 118 MMHG | WEIGHT: 132.2 LBS | BODY MASS INDEX: 18.97 KG/M2

## 2022-11-10 DIAGNOSIS — Z98.890 HISTORY OF LOOP ELECTRICAL EXCISION PROCEDURE (LEEP): ICD-10-CM

## 2022-11-10 DIAGNOSIS — Z98.890 HISTORY OF LOOP ELECTRICAL EXCISION PROCEDURE (LEEP): Primary | ICD-10-CM

## 2022-11-10 DIAGNOSIS — Z98.891 HX OF CESAREAN SECTION: ICD-10-CM

## 2022-11-10 DIAGNOSIS — R87.810 ASCUS WITH POSITIVE HIGH RISK HPV CERVICAL: ICD-10-CM

## 2022-11-10 DIAGNOSIS — Z34.92 PRENATAL CARE IN SECOND TRIMESTER: Primary | ICD-10-CM

## 2022-11-10 DIAGNOSIS — R81 GLUCOSURIA: ICD-10-CM

## 2022-11-10 DIAGNOSIS — F11.11 HISTORY OF HEROIN ABUSE (HCC): ICD-10-CM

## 2022-11-10 DIAGNOSIS — Z98.891 HISTORY OF VBAC: ICD-10-CM

## 2022-11-10 DIAGNOSIS — Z80.3 FAMILY HISTORY OF BREAST CANCER: ICD-10-CM

## 2022-11-10 DIAGNOSIS — R87.610 ASCUS WITH POSITIVE HIGH RISK HPV CERVICAL: ICD-10-CM

## 2022-11-10 PROCEDURE — G8427 DOCREV CUR MEDS BY ELIG CLIN: HCPCS | Performed by: OBSTETRICS & GYNECOLOGY

## 2022-11-10 PROCEDURE — 99214 OFFICE O/P EST MOD 30 MIN: CPT | Performed by: OBSTETRICS & GYNECOLOGY

## 2022-11-10 PROCEDURE — 76817 TRANSVAGINAL US OBSTETRIC: CPT | Performed by: OBSTETRICS & GYNECOLOGY

## 2022-11-10 PROCEDURE — 36415 COLL VENOUS BLD VENIPUNCTURE: CPT | Performed by: OBSTETRICS & GYNECOLOGY

## 2022-11-10 PROCEDURE — G8484 FLU IMMUNIZE NO ADMIN: HCPCS | Performed by: OBSTETRICS & GYNECOLOGY

## 2022-11-10 PROCEDURE — G8420 CALC BMI NORM PARAMETERS: HCPCS | Performed by: OBSTETRICS & GYNECOLOGY

## 2022-11-10 PROCEDURE — 4004F PT TOBACCO SCREEN RCVD TLK: CPT | Performed by: OBSTETRICS & GYNECOLOGY

## 2022-11-10 NOTE — PROGRESS NOTES
Initial OB Office Visit    CC:   Chief Complaint   Patient presents with    Initial Prenatal Visit       HPI:  Pt seen and examined. No concerns/complaints. Denies VB, LOF, cramps. No FM yet. Doing well since last visit without issues. Maternal wellness questionnaire reviewed - no concerns today. Score 0. Objective:  /66   Pulse 80   Wt 132 lb 3.2 oz (60 kg)   LMP 2022   BMI 18.97 kg/m²   Gen: AO, NAD  Abd: Soft, NT  FHT: 138    Assessment/Plan:  34 y.o. [id number] at 16w4d (Estimated Date of Delivery: 23) presents for LISE appointment:      Diagnosis Orders   1. Prenatal care in second trimester  HIV Screen    Hep C AB RLFX HCV PCR-A    Hepatitis B Surface Antigen    Type and Screen    Rubella antibody, IgG    Varicella Zoster Antibody, IgG    Drug Screen Multi Urine With Bup    CBC with Auto Differential    Syphilis Antibody Cascading Reflex    Glucose Challenge Gestational    Lactate Dehydrogenase    Uric Acid    Comprehensive Metabolic Panel    Protein / creatinine ratio, urine      2. ASCUS with positive high risk HPV cervical        3. History of heroin abuse ([facility] [address]        4. History of loop electrical excision procedure (LEEP)        5. Hx of  section        6. Preeclampsia in postpartum period  Lactate Dehydrogenase    Uric Acid    Comprehensive Metabolic Panel    Protein / creatinine ratio, urine      7. History of         8. Family history of breast cancer        9.  Glucosuria  Glucose Challenge Gestational        Doing well today, routine care  - FWB reassuring on US today  - IPV labs sent today  - q2wk CL (pt reports having these with her last pregnancy)  - plan colpo prior to [address] next visit (rare ASCUS/HPV other+, h/o LEEP)  - states she is weaning her subutex on her own, going OK, not interested in subutex/suboxone in pregnancy  - desires RCD with salpingectomy for delivery, will sign paperwork later in pregnancy  - h/o PreE, baseline labs sent today, start daily ASA next visit  - early 1hr GTT due to glucosuria on amenorrhea labs, however when LPN went in room to draw blood pt was sucking on a sucker, advised this is likely to alter her results, will f/u pending test results    Dispo: RTC in 2 weeks for colpo, CL  Reggie Christian MD

## 2022-11-10 NOTE — PROGRESS NOTES
Temp-98f infrared  Maternal emotional well being screening form completed and reviewed with patient. Current score is 0. Patient given referral to [address] [place] [address] ([phone]):  No

## 2022-11-11 LAB
A/G RATIO: 1.6 (ref 1.1–2.2)
ABO/RH: NORMAL
ABO/RH: NORMAL
ALBUMIN SERPL-MCNC: 4.1 G/DL (ref 3.4–5)
ALP BLD-CCNC: 53 U/L (ref 40–129)
ALT SERPL-CCNC: 7 U/L (ref 10–40)
AMPHETAMINE SCREEN, URINE: NORMAL
ANION GAP SERPL CALCULATED.3IONS-SCNC: 16 MMOL/L (ref 3–16)
ANTIBODY SCREEN: NORMAL
AST SERPL-CCNC: 9 U/L (ref 15–37)
BARBITURATE SCREEN URINE: NORMAL
BASOPHILS ABSOLUTE: 0.1 K/UL (ref 0–0.2)
BASOPHILS RELATIVE PERCENT: 1 %
BENZODIAZEPINE SCREEN, URINE: NORMAL
BILIRUB SERPL-MCNC: 0.3 MG/DL (ref 0–1)
BUN BLDV-MCNC: 10 MG/DL (ref 7–20)
BUPRENORPHINE URINE: NORMAL
CALCIUM SERPL-MCNC: 9.2 MG/DL (ref 8.3–10.6)
CANNABINOID SCREEN URINE: NORMAL
CHLORIDE BLD-SCNC: 101 MMOL/L (ref 99–110)
CO2: 19 MMOL/L (ref 21–32)
COCAINE METABOLITE SCREEN URINE: NORMAL
CREAT SERPL-MCNC: <0.5 MG/DL (ref 0.6–1.1)
CREATININE URINE: 97.5 MG/DL (ref 28–259)
EOSINOPHILS ABSOLUTE: 0.1 K/UL (ref 0–0.6)
EOSINOPHILS RELATIVE PERCENT: 1.5 %
FENTANYL SCREEN, URINE: NORMAL
GFR SERPL CREATININE-BSD FRML MDRD: >60 ML/MIN/{1.73_M2}
GLUCOSE BLD-MCNC: 180 MG/DL (ref 70–99)
GLUCOSE CHALLENGE: 180 MG/DL
HCT VFR BLD CALC: 33.6 % (ref 36–48)
HEMOGLOBIN: 11.2 G/DL (ref 12–16)
HEPATITIS B SURFACE ANTIGEN INTERPRETATION: NORMAL
HEPATITIS C VIRUS AB BY CIA INDEX: 0.07 IV
HEPATITIS C VIRUS AB BY CIA INTERPRETATION: NEGATIVE
LACTATE DEHYDROGENASE: 152 U/L (ref 100–190)
LYMPHOCYTES ABSOLUTE: 2 K/UL (ref 1–5.1)
LYMPHOCYTES RELATIVE PERCENT: 27.4 %
Lab: NORMAL
MCH RBC QN AUTO: 31.4 PG (ref 26–34)
MCHC RBC AUTO-ENTMCNC: 33.5 G/DL (ref 31–36)
MCV RBC AUTO: 93.8 FL (ref 80–100)
METHADONE SCREEN, URINE: NORMAL
MONOCYTES ABSOLUTE: 0.3 K/UL (ref 0–1.3)
MONOCYTES RELATIVE PERCENT: 3.7 %
NEUTROPHILS ABSOLUTE: 4.8 K/UL (ref 1.7–7.7)
NEUTROPHILS RELATIVE PERCENT: 66.4 %
OPIATE SCREEN URINE: NORMAL
OXYCODONE URINE: NORMAL
PDW BLD-RTO: 13.6 % (ref 12.4–15.4)
PH UA: 7
PHENCYCLIDINE SCREEN URINE: NORMAL
PLATELET # BLD: 180 K/UL (ref 135–450)
PMV BLD AUTO: 10.4 FL (ref 5–10.5)
POTASSIUM SERPL-SCNC: 4.1 MMOL/L (ref 3.5–5.1)
PROTEIN PROTEIN: 11 MG/DL
PROTEIN/CREAT RATIO: 0.1 MG/DL
RBC # BLD: 3.58 M/UL (ref 4–5.2)
RUBELLA ANTIBODY IGG: 96.5 IU/ML
SODIUM BLD-SCNC: 136 MMOL/L (ref 136–145)
TOTAL PROTEIN: 6.6 G/DL (ref 6.4–8.2)
TOTAL SYPHILLIS IGG/IGM: NORMAL
URIC ACID, SERUM: 3 MG/DL (ref 2.6–6)
VARICELLA-ZOSTER VIRUS AB, IGG: NORMAL
WBC # BLD: 7.3 K/UL (ref 4–11)

## 2022-11-13 LAB — MISCELLANEOUS LAB TEST ORDER: NORMAL

## 2022-11-22 ENCOUNTER — ROUTINE PRENATAL (OUTPATIENT)
Dept: OBGYN CLINIC | Age: 32
End: 2022-11-22
Payer: COMMERCIAL

## 2022-11-22 ENCOUNTER — OFFICE VISIT (OUTPATIENT)
Dept: OBGYN CLINIC | Age: 32
End: 2022-11-22
Payer: COMMERCIAL

## 2022-11-22 VITALS
HEART RATE: 82 BPM | SYSTOLIC BLOOD PRESSURE: 126 MMHG | WEIGHT: 137 LBS | DIASTOLIC BLOOD PRESSURE: 82 MMHG | BODY MASS INDEX: 19.66 KG/M2

## 2022-11-22 DIAGNOSIS — Z98.890 HISTORY OF LOOP ELECTRICAL EXCISION PROCEDURE (LEEP): ICD-10-CM

## 2022-11-22 DIAGNOSIS — Z80.3 FAMILY HISTORY OF BREAST CANCER: ICD-10-CM

## 2022-11-22 DIAGNOSIS — Z98.891 HX OF CESAREAN SECTION: ICD-10-CM

## 2022-11-22 DIAGNOSIS — R87.610 ASCUS WITH POSITIVE HIGH RISK HPV CERVICAL: ICD-10-CM

## 2022-11-22 DIAGNOSIS — Z98.891 HISTORY OF VBAC: ICD-10-CM

## 2022-11-22 DIAGNOSIS — Z98.890 HISTORY OF LOOP ELECTRICAL EXCISION PROCEDURE (LEEP): Primary | ICD-10-CM

## 2022-11-22 DIAGNOSIS — R87.810 ASCUS WITH POSITIVE HIGH RISK HPV CERVICAL: ICD-10-CM

## 2022-11-22 DIAGNOSIS — R81 GLUCOSURIA: ICD-10-CM

## 2022-11-22 DIAGNOSIS — F11.11 HISTORY OF HEROIN ABUSE (HCC): ICD-10-CM

## 2022-11-22 DIAGNOSIS — Z34.92 PRENATAL CARE IN SECOND TRIMESTER: Primary | ICD-10-CM

## 2022-11-22 PROCEDURE — G8420 CALC BMI NORM PARAMETERS: HCPCS | Performed by: OBSTETRICS & GYNECOLOGY

## 2022-11-22 PROCEDURE — 99213 OFFICE O/P EST LOW 20 MIN: CPT | Performed by: OBSTETRICS & GYNECOLOGY

## 2022-11-22 PROCEDURE — G8427 DOCREV CUR MEDS BY ELIG CLIN: HCPCS | Performed by: OBSTETRICS & GYNECOLOGY

## 2022-11-22 PROCEDURE — 57452 EXAM OF CERVIX W/SCOPE: CPT | Performed by: OBSTETRICS & GYNECOLOGY

## 2022-11-22 PROCEDURE — 4004F PT TOBACCO SCREEN RCVD TLK: CPT | Performed by: OBSTETRICS & GYNECOLOGY

## 2022-11-22 PROCEDURE — 36415 COLL VENOUS BLD VENIPUNCTURE: CPT | Performed by: OBSTETRICS & GYNECOLOGY

## 2022-11-22 PROCEDURE — G8484 FLU IMMUNIZE NO ADMIN: HCPCS | Performed by: OBSTETRICS & GYNECOLOGY

## 2022-11-22 PROCEDURE — 76817 TRANSVAGINAL US OBSTETRIC: CPT | Performed by: OBSTETRICS & GYNECOLOGY

## 2022-11-22 NOTE — PROGRESS NOTES
Colposcopy Procedure Note    Indications: Pt presents for colposcopy secondary to rare ASCUS/HPV other+ pap smear with history of LEEP. Pt is pregnant, 18+2. Procedure Details   The risks and benefits of the procedure were discussed in detail with the patient. She was aware the risks may include, but are not limited to bleeding, infection and damage to surround structures. She acknowledged these risks and elected to proceed. Written consent was obtained. Urine HCG testing (today): positive    A speculum was placed in vagina and excellent visualization of cervix was achieved, the cervix was swabbed x3 with acetic acid solution. Single acetowhite lesion visualized at 12 o'clock, appears low grade without concerning features so no biopsies were obtained given pregnant status. ECC was not performed. Colposcopy was  adequate . Findings:  Cervix:   Physical Exam  Genitourinary:            Vaginal inspection: Normal without gross visible lesions  Vulvar inspection: Normal without gross visible lesions    Specimens: None    Complications: none.     Plan:  Plan repeat pap/cotesting in 12 months    Iman Kee MD

## 2022-11-22 NOTE — PROGRESS NOTES
Temp-97.9F infrared    Patient began drinking glucose 100G at 0834. Finished drink at [phone]. No c/o n/v at this time. Will continue to monitor.

## 2022-11-22 NOTE — PROGRESS NOTES
Return OB Office Visit    CC:   Chief Complaint   Patient presents with    Routine Prenatal Visit       HPI:  Pt seen and examined. No concerns/complaints. Denies VB, LOF, ctx. +FM. Maternal wellness questionnaire reviewed - no concerns today. Score 0. Objective:  /82   Pulse 82   Wt 137 lb (62.1 kg)   LMP 2022   BMI 19.66 kg/m²   Gen: AO, NAD  Abd: Soft, NT  FHT: 135    Assessment/Plan:  34 y.o. [id number] at 18w2d (Estimated Date of Delivery: 23) presents for LISE appointment:      Diagnosis Orders   1. Prenatal care in second trimester        2. ASCUS with positive high risk HPV cervical        3. History of heroin abuse ([facility] [address]        4. History of loop electrical excision procedure (LEEP)        5. Hx of  section        6. Preeclampsia in postpartum period        7. History of         8. Family history of breast cancer        9.  Glucosuria          Doing well today, routine care  - FWB reassuring on US today  - IPV labs reviewed and wnl, Hgb 11.2  - q2wk CL (pt reports having these with her last pregnancy) -- 3.69cm today  - colpo completed 22, plan to repeat pap/cotesting in 12 months (rare ASCUS/HPV other+, h/o LEEP)  - states she is weaning her subutex on her own, going OK, not interested in subutex/suboxone in pregnancy  - desires RCD with salpingectomy for delivery, will sign paperwork later in pregnancy  - h/o PreE, baseline labs wnl, PCR 0.1, started daily ASA today  - early 1hr GTT due to glucosuria on amenorrhea labs was elevated (180), 3hr GTT completed today    Dispo: RTC in 2 weeks for anatomy/CL  Vern Villar MD

## 2022-11-23 LAB
GLUCOSE FASTING: 81 MG/DL
GLUCOSE TOLERANCE TEST 1 HOUR: 156 MG/DL
GLUCOSE TOLERANCE TEST 2 HOUR: 71 MG/DL
GLUCOSE TOLERANCE TEST 3 HOUR: 44 MG/DL

## 2022-12-04 DIAGNOSIS — Z34.82 PRENATAL CARE, SUBSEQUENT PREGNANCY IN SECOND TRIMESTER: Primary | ICD-10-CM

## 2022-12-05 ENCOUNTER — ROUTINE PRENATAL (OUTPATIENT)
Dept: OBGYN CLINIC | Age: 32
End: 2022-12-05
Payer: COMMERCIAL

## 2022-12-05 VITALS
SYSTOLIC BLOOD PRESSURE: 100 MMHG | HEART RATE: 80 BPM | WEIGHT: 140.6 LBS | BODY MASS INDEX: 20.17 KG/M2 | DIASTOLIC BLOOD PRESSURE: 66 MMHG | TEMPERATURE: 98.1 F

## 2022-12-05 DIAGNOSIS — R87.610 ASCUS WITH POSITIVE HIGH RISK HPV CERVICAL: ICD-10-CM

## 2022-12-05 DIAGNOSIS — Z98.890 HISTORY OF LOOP ELECTRICAL EXCISION PROCEDURE (LEEP): ICD-10-CM

## 2022-12-05 DIAGNOSIS — R81 GLUCOSURIA: ICD-10-CM

## 2022-12-05 DIAGNOSIS — Z80.3 FAMILY HISTORY OF BREAST CANCER: ICD-10-CM

## 2022-12-05 DIAGNOSIS — F11.11 HISTORY OF HEROIN ABUSE (HCC): ICD-10-CM

## 2022-12-05 DIAGNOSIS — Z34.92 PRENATAL CARE IN SECOND TRIMESTER: Primary | ICD-10-CM

## 2022-12-05 DIAGNOSIS — Z98.891 HX OF CESAREAN SECTION: ICD-10-CM

## 2022-12-05 DIAGNOSIS — R87.810 ASCUS WITH POSITIVE HIGH RISK HPV CERVICAL: ICD-10-CM

## 2022-12-05 DIAGNOSIS — Z98.891 HISTORY OF VBAC: ICD-10-CM

## 2022-12-05 PROCEDURE — G8427 DOCREV CUR MEDS BY ELIG CLIN: HCPCS | Performed by: OBSTETRICS & GYNECOLOGY

## 2022-12-05 PROCEDURE — 99213 OFFICE O/P EST LOW 20 MIN: CPT | Performed by: OBSTETRICS & GYNECOLOGY

## 2022-12-05 PROCEDURE — 4004F PT TOBACCO SCREEN RCVD TLK: CPT | Performed by: OBSTETRICS & GYNECOLOGY

## 2022-12-05 PROCEDURE — G8484 FLU IMMUNIZE NO ADMIN: HCPCS | Performed by: OBSTETRICS & GYNECOLOGY

## 2022-12-05 PROCEDURE — G8420 CALC BMI NORM PARAMETERS: HCPCS | Performed by: OBSTETRICS & GYNECOLOGY

## 2022-12-05 NOTE — PROGRESS NOTES
Return OB Office Visit    CC:   Chief Complaint   Patient presents with    Routine Prenatal Visit       HPI:  Patient seen and examined. Patient is overall doing well today. Denies VB, LOF, ctx. +FM. Denies headaches, vision changes, RUQ pain, increased LE edema. Denies chest pain, shortness of breath, fever, chills, nausea, vomiting. Is requesting rx for Gabapentin. Has a history of heroin use and took Subutex prior to pregnancy. Reports she does not like taking Subutex in pregnancy and transitioned to Gabapentin. Is currently getting Gabapentin off the street and is taking 800mg in AM.  States she thought Dr. Yasmeen Ferreira was supposed to provide for her. Review of Systems: The following ROS was otherwise negative, except as noted in the HPI: constitutional, HEENT, respiratory, cardiovascular, gastrointestinal, genitourinary, skin, musculoskeletal, neurological, psych    Objective:  /66   Pulse 80   Temp 98.1 °F (36.7 °C) (Infrared)   Wt 140 lb 9.6 oz (63.8 kg)   LMP 07/17/2022   BMI 20.17 kg/m²     Physical Exam  Vitals reviewed. Constitutional:       General: She is not in acute distress. Appearance: She is well-developed. HENT:      Head: Normocephalic and atraumatic. Eyes:      Conjunctiva/sclera: Conjunctivae normal.   Cardiovascular:      Rate and Rhythm: Normal rate. Pulmonary:      Effort: Pulmonary effort is normal. No respiratory distress. Abdominal:      General: There is no distension. Palpations: Abdomen is soft. Tenderness: There is no abdominal tenderness. There is no guarding or rebound. Musculoskeletal:         General: No swelling. Skin:     General: Skin is warm and dry. Neurological:      Mental Status: She is alert and oriented to person, place, and time. Psychiatric:         Mood and Affect: Mood normal.         Behavior: Behavior normal.         Thought Content:  Thought content normal.       Ultrasound:   Narrative   OBSTETRIC ULTRASOUND -- SECOND TRIMESTER       DATE:  12/5/22       PHYSICIAN: LEATHA Hassan D.O.       SONOGRAPHER: ROSALIO Daniel RDMS       INDICATION:  Second trimester, Anatomical screening       TYPE OF SCAN: abdominal and vaginal 3.5 MHz 5MHz       FINDINGS:         A single viable intrauterine pregnancy is noted in variable presentation. Cardiac and somatic activity are noted. The following values were obtained:                   Fetal heart rate                                                  153bpm                   BPD                                                                    4.43cm                        20.2 %               Head Circumference                                          17.20cm                      25.4 %                Abdominal Circumference                              15.86cm                         72.0 %               Femur Length                                                     3.04cm                        18.3 %               Humerus Length                                                2.95cm                        35.1 %               Cerebellum                                                         1.92cm                        23.5 %                   Amniotic fluid DVP                                             5.10cm                   EFW                                                                   337g                46.7 percentile       Subjective amniotic fluid volume is normal. Based on sonographic criteria,    the estimated fetal age is 19weeks and 1days with EDC of 4/30/23. There    is a 7 day discordance with the established EDC of 4/23/23. The patient has a right lateral placenta that is adequate distance in    relation to the internal cervical os. The evaluation of the lower uterine    segment and cervix reveals normal appearing anatomy. Transvaginal cervical    length is 4.07cm with no funneling noted. The uterus is    unremarkable/gravid.  Maternal ovaries and adnexae are not well visualized    due to the size of the uterus and patient's gravid state. Normal Anatomy Seen:                                                                                          CSP                                                               Kidneys                                      Lateral ventricles                                                               Umbilical    arteries                    Cerebellum   Ductal arch                                          Bladder                                       Cisterna magna   Face                                                    Nuchal fold   Diaphragm                                          Profile                                         Upper extremities                                                             Nose/lips                                    Lower extremities   ACI                                                      PCI                                             Choroid plexus       Suboptimal anatomy seen: Spine, RVOT, LVOT, AA, cross over, 4 chamber    heart, stomach       Abnormal anatomy seen: The fetal genitalia is noted to be Male. Impression   Single living IUP. No gross structural abnormalities are visualized. Amniotic fluid volume is subjectively normal.  Suboptimal spine, RVOT,    LVOT, AA, cross over, 4 chamber heart, stomach       The patient is well aware of the limitations of ultrasound in the    detection of fetal anomalies. The scan is limited by fetal position. Nikki Dillon,         Assessment/Plan:   Ja Coleman is a 34 y.o. [id number] at 20w1d who presents for routine OB visit    1.  Prenatal care in second trimester     - Patient is doing well today without complaints     - Fetal wellbeing reassuring by US today     - Maternal wellness questionnaire reviewed - no concerns today, score 0     - Encourage PNV     -    - PNL: A pos/ab neg, R Immune, Varicella Immune, HepB neg, HepC negative, HIV neg, syphilis non-reactive, Hgb 11.2, Plt 180, UDS neg, UCx neg, GCCT neg/neg, Pap ASCUS HR other HPV pos 10/10/2022     - Anatomy: Lateral placenta, no previa, CL 4.07cm, AIMEE wnl, EFW 46.7 %ile, no gross anatomic abnormalities, Sub-optimal Spine, LVOT, RVOT, AA, cross over, 4 ch heart, stomach. Male fetus     - Labor, decreased FM, VB, LOF precautions reviewed     - Return precautions reviewed     - Follow-up in 2 weeks for serial CL and 4 weeks for repeat anatomy images     2. History of heroin abuse ([facility] [address]     - Hx of Suboxone use     - Self weaned off of Suboxone as she does not desire in pregnancy     - However, has done so using 800mg of Gabapentin daily     - Reviewed r/b/a of both in pregnancy     - Patient was under the impression that a provider who does Subutex was going to give her the Gabapentin     - Reviewed with Dr. Hiren Mijares and advised that she only does Subutex     - For Gabapentin would need to enroll in the [employer] program     -  working on referral    3. Hx of  section     - Desires RCS    4. ASCUS with positive high risk HPV cervical     - Pap smear with rare ASCUS, HR HPV other types positive     - Hx of LEEP     - Colpo 2022 - Low grade acetowhite changes     - Plan to repeat pap/cotesting in 12 months    5. History of loop electrical excision procedure (LEEP)     - CL at 18 weeks 3.69cm     - CL at 20 weeks 4.07cm     - Continue serial cervical length (pt reports having these with her last pregnancy)    6. History of      - Desires RCS    7. Preeclampsia in postpartum period     - Hx of postpartum preeclampsia     - Baseline labs wnl, PCR 0.1     - Daily ASA    8. Family history of breast cancer     - Desires RCS with prophylactic salpingectomy     - Approved through Ethics via message from Dr. James Walsh     - Paperwork signed today    9.  Glucosuria     - Early 1 hr GTT due to glucosuria on amenorrhea labs was elevated 180     - 3 hr GTT within normal limits 81 / 156 / 71 / 44    Approximately 25 minutes spent in room counseling patient on condition and coordination of care with over 50% in direct face to face counseling.       Judith Abler, DO

## 2022-12-05 NOTE — PROGRESS NOTES
Maternal emotional well being screening form completed and reviewed with patient.  Current score is 0

## 2022-12-06 ENCOUNTER — TELEPHONE (OUTPATIENT)
Dept: OBGYN CLINIC | Age: 32
End: 2022-12-06

## 2022-12-06 NOTE — TELEPHONE ENCOUNTER
Called patient about the referral for [facility]. They have a 2 day intrduction to the program and most patients do back to back. Patient will be there most of the day. They are open on the weekdays and can take walk ins after 3pm. She is going to look at her schedule to see what days work and call back tomorrow. No referral is needed from the provider. Scheduling number is [phone]. She is currently on street gabapentin. Routing to the [place] and FYI to Dr. Leticia Tellez.

## 2022-12-08 NOTE — TELEPHONE ENCOUNTER
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 Routing to Dr. Lalit Montenegro.

## 2022-12-22 DIAGNOSIS — Z34.92 PRENATAL CARE IN SECOND TRIMESTER: Primary | ICD-10-CM

## 2022-12-22 DIAGNOSIS — Z98.890 HISTORY OF LOOP ELECTRICAL EXCISION PROCEDURE (LEEP): ICD-10-CM

## 2023-01-03 ENCOUNTER — ROUTINE PRENATAL (OUTPATIENT)
Dept: OBGYN CLINIC | Age: 33
End: 2023-01-03

## 2023-01-03 VITALS
BODY MASS INDEX: 20.85 KG/M2 | HEART RATE: 69 BPM | SYSTOLIC BLOOD PRESSURE: 100 MMHG | DIASTOLIC BLOOD PRESSURE: 58 MMHG | WEIGHT: 145.3 LBS

## 2023-01-03 DIAGNOSIS — Z34.92 PRENATAL CARE IN SECOND TRIMESTER: Primary | ICD-10-CM

## 2023-01-03 DIAGNOSIS — Z98.891 HX OF CESAREAN SECTION: ICD-10-CM

## 2023-01-03 DIAGNOSIS — F11.11 HISTORY OF HEROIN ABUSE (HCC): ICD-10-CM

## 2023-01-03 DIAGNOSIS — Z98.891 HISTORY OF VBAC: ICD-10-CM

## 2023-01-03 DIAGNOSIS — Z86.19 HISTORY OF TRICHOMONAL VAGINITIS: ICD-10-CM

## 2023-01-03 DIAGNOSIS — Z98.890 HISTORY OF LOOP ELECTRICAL EXCISION PROCEDURE (LEEP): ICD-10-CM

## 2023-01-03 DIAGNOSIS — N90.89: ICD-10-CM

## 2023-01-03 DIAGNOSIS — Z80.3 FAMILY HISTORY OF BREAST CANCER: ICD-10-CM

## 2023-01-03 NOTE — PROGRESS NOTES
Maternal emotional well being screening form completed and reviewed with patient. Current score is 0. Patient given referral to [address] [place] [address] ([phone]):  No

## 2023-01-08 PROBLEM — N90.89: Status: ACTIVE | Noted: 2023-01-08

## 2023-01-09 NOTE — PROGRESS NOTES
33 y/o [id number] female at 24 weeks 2 days gestation with [facility] 23 presents for prenatal visit and ultrasound. Pregnancy is complicated by uterine synechiae--lower uterine segment, history of heroin use (Subutex use prior to pregnancy)--no current use, Gabapentin use--no current use, history of , history of , history of LEEP procedure, ASCUS pap smear with high risk HPV positive, history of postpartum pre-eclampsia, family history of breast cancer, and elevated early 1 hour GTT with normal 3 hour GTT. Denies vaginal bleeding, loss of fluid, and pelvic pain. Admits to fetal movement. Denies headache, vision changes, and RUQ pain  Denies fever, chills, chest pain, shortness of breath, nausea, vomiting, diarrhea, constipation, dysuria and hematuria. Maternal Wellness Questionnaire reviewed--no concerns. OBSTETRICAL ULTRASOUND GROWTH     DATE: 2023     PHYSICIAN: NICO Johansen      SONOGRAPHER: Radha Humphrey RDMS     INDICATION: Growth, transvaginal      TYPE OF SCAN: vaginal abdominal     FINDINGS:  A single viable intrauterine pregnancy is noted in cephalic presentation.  Cardiac and somatic activity are noted.     The following values were obtained:              Fetal heart rate                                               132bpm              BPD                                                                 5.99cm                        48.2 %              Head Circumference                                       22.39cm                      35.7 %               Abdominal Circumference                              20.06cm                      54.3 %              Femur Length                                                  4.21cm                        20.7 %              Amniotic fluid index                                         6.57cm              EFW                                                                681g                40.7 percentile     Amniotic fluid volume is normal. Based on sonographic criteria the estimated fetal age is 24weeks and 2days with EDC of 2023. There is a 0 day discordance with the established EDC of 2023.      The patient has a posterior placenta that is adequate distance in relation to the internal cervical os. The evaluation of the lower uterine segment and cervix reveals normal appearing anatomy. The uterus is unremarkable/gravid. Maternal ovaries and adnexae are not well visualized due to the size of the uterus and patient's gravid state.     Anatomy seen includes:  4chamber heart, RVOT, LVOT, aortic arch stomach, kidneys, bladder     IMPRESSION:  Single live IUP in the second trimester. Adequate interval fetal growth. Cervical spine remains limited due to fetal position. Synechiae noted.      Imaging is limited secondary to fetal position. The patient is well aware of the limitations of ultrasound in the detection of anomalies. Diagnosis Orders   1. Prenatal care in second trimester        2. History of heroin abuse ([address]        3. History of trichomonal vaginitis        4. Hx of  section        5. Family history of breast cancer        6. History of         7. History of loop electrical excision procedure (LEEP)        8. Synechia vulvae          Approximately 20 minutes spent in counseling and coordination of care with over 50% in direct face to face counseling. Ultrasound result reviewed--reassurance--synechiae noted--growth scan recommended Q 4 weeks. Start daily 81 mg ASA  Repeat  with ovarian cancer risk reducing bilateral salpingectomy planned--approved by Ethics   Growth scan every 4 weeks  1 hour GTT and HgA1C next visit. Follow up prn and 4 weeks for prenatal visit and growth scan.

## 2023-01-18 ENCOUNTER — ROUTINE PRENATAL (OUTPATIENT)
Dept: OBGYN CLINIC | Age: 33
End: 2023-01-18
Payer: COMMERCIAL

## 2023-01-18 VITALS
HEIGHT: 70 IN | BODY MASS INDEX: 21.36 KG/M2 | TEMPERATURE: 98.1 F | DIASTOLIC BLOOD PRESSURE: 60 MMHG | WEIGHT: 149.2 LBS | SYSTOLIC BLOOD PRESSURE: 110 MMHG | HEART RATE: 95 BPM

## 2023-01-18 DIAGNOSIS — O09.899 HISTORY OF PRETERM DELIVERY, CURRENTLY PREGNANT: ICD-10-CM

## 2023-01-18 DIAGNOSIS — Z98.891 HISTORY OF VBAC: ICD-10-CM

## 2023-01-18 DIAGNOSIS — F11.11 HISTORY OF HEROIN ABUSE (HCC): ICD-10-CM

## 2023-01-18 DIAGNOSIS — O99.332 MATERNAL TOBACCO USE IN SECOND TRIMESTER: ICD-10-CM

## 2023-01-18 DIAGNOSIS — Z98.891 HX OF CESAREAN SECTION: ICD-10-CM

## 2023-01-18 DIAGNOSIS — Z80.3 FAMILY HISTORY OF BREAST CANCER: ICD-10-CM

## 2023-01-18 DIAGNOSIS — O41.8X20 AMNIOTIC BAND IN SECOND TRIMESTER, SINGLE OR UNSPECIFIED FETUS: ICD-10-CM

## 2023-01-18 DIAGNOSIS — Z34.92 PRENATAL CARE IN SECOND TRIMESTER: Primary | ICD-10-CM

## 2023-01-18 DIAGNOSIS — Z98.890 HISTORY OF LOOP ELECTRICAL EXCISION PROCEDURE (LEEP): ICD-10-CM

## 2023-01-18 PROCEDURE — G8484 FLU IMMUNIZE NO ADMIN: HCPCS | Performed by: OBSTETRICS & GYNECOLOGY

## 2023-01-18 PROCEDURE — G8420 CALC BMI NORM PARAMETERS: HCPCS | Performed by: OBSTETRICS & GYNECOLOGY

## 2023-01-18 PROCEDURE — G8427 DOCREV CUR MEDS BY ELIG CLIN: HCPCS | Performed by: OBSTETRICS & GYNECOLOGY

## 2023-01-18 PROCEDURE — 4004F PT TOBACCO SCREEN RCVD TLK: CPT | Performed by: OBSTETRICS & GYNECOLOGY

## 2023-01-18 PROCEDURE — 99213 OFFICE O/P EST LOW 20 MIN: CPT | Performed by: OBSTETRICS & GYNECOLOGY

## 2023-01-31 ENCOUNTER — ROUTINE PRENATAL (OUTPATIENT)
Dept: OBGYN CLINIC | Age: 33
End: 2023-01-31

## 2023-01-31 VITALS — SYSTOLIC BLOOD PRESSURE: 100 MMHG | HEART RATE: 85 BPM | DIASTOLIC BLOOD PRESSURE: 60 MMHG

## 2023-01-31 DIAGNOSIS — Z34.83 PRENATAL CARE, SUBSEQUENT PREGNANCY IN THIRD TRIMESTER: Primary | ICD-10-CM

## 2023-01-31 DIAGNOSIS — Z98.890 HISTORY OF LOOP ELECTRICAL EXCISION PROCEDURE (LEEP): ICD-10-CM

## 2023-01-31 DIAGNOSIS — O41.8X20 AMNIOTIC BAND IN SECOND TRIMESTER, SINGLE OR UNSPECIFIED FETUS: ICD-10-CM

## 2023-01-31 DIAGNOSIS — N90.89: ICD-10-CM

## 2023-01-31 DIAGNOSIS — O99.332 MATERNAL TOBACCO USE IN SECOND TRIMESTER: ICD-10-CM

## 2023-01-31 DIAGNOSIS — R87.810 ASCUS WITH POSITIVE HIGH RISK HPV CERVICAL: ICD-10-CM

## 2023-01-31 DIAGNOSIS — R87.610 ASCUS WITH POSITIVE HIGH RISK HPV CERVICAL: ICD-10-CM

## 2023-01-31 DIAGNOSIS — Z98.891 HISTORY OF VBAC: ICD-10-CM

## 2023-01-31 DIAGNOSIS — Z86.19 HISTORY OF TRICHOMONAL VAGINITIS: ICD-10-CM

## 2023-01-31 DIAGNOSIS — O09.899 HISTORY OF PRETERM DELIVERY, CURRENTLY PREGNANT: ICD-10-CM

## 2023-01-31 DIAGNOSIS — F11.11 HISTORY OF HEROIN ABUSE (HCC): ICD-10-CM

## 2023-01-31 DIAGNOSIS — Z98.891 HX OF CESAREAN SECTION: ICD-10-CM

## 2023-01-31 DIAGNOSIS — Z80.3 FAMILY HISTORY OF BREAST CANCER: ICD-10-CM

## 2023-01-31 NOTE — PROGRESS NOTES
34 y.o. [id number] at 28w2d EGA Estimated Date of Delivery: 4/23/23 here for LISE:     Pt seen and examined. No concerns/complaints. Denies VB, LOF, CTX. Endorses (+) FM. Denies fevers / chills / chest pain / shortness of breath. Denies HA, changes with vision, RUQ pain, edema. MWQ reviewed. Doing 3hr GTT today. Objective:  /60   Pulse 85   LMP 07/17/2022   Gen: AO, NAD  Abd: Soft, NT, gravid   Ext: Mild LE edema  OMM: Increased lumbar lordosis    Images:   Narrative   OBSTETRICAL ULTRASOUND GROWTH       DATE: 01/31/2023       PHYSICIAN: LEATHA King.        SONOGRAPHER: Ra Reynoso RDMS       INDICATION: Growth,        TYPE OF SCAN: abdominal       FINDINGS:   A single viable intrauterine pregnancy is noted in cephalic presentation. Cardiac and somatic activity are noted. The following values were obtained:               Fetal heart rate                                                  136bpm               BPD                                                                    7.25cm                        64.7 %               Head Circumference                                          27.74cm                      80.5 %                Abdominal Circumference                              24.5cm                           57.5 %               Femur Length                                                     5.15cm                        16.0 %               Amniotic fluid index                                            9.03cm               EFW                                                                   1241g              45.9 percentile       Amniotic fluid volume is normal. Based on sonographic criteria the    estimated fetal age is 29weeks and 0days with EDC of 04/18/2023. There is    a 5 day discordance with the established EDC of 04/23/2023. The patient has an anterior posterior placenta that is adequate distance    in relation to the internal cervical os.  The evaluation of the lower    uterine segment and cervix reveals normal appearing anatomy. The uterus    is unremarkable/gravid. Maternal ovaries and adnexae are not well    visualized due to the size of the uterus and patient's gravid state. Anatomy seen includes: 4 chamber heart, RVOT, LVOT, 3VV, aortic arch,    spine,stomach, kidneys, bladder           Impression   Single live IUP in the third trimester. Adequate interval fetal growth. Synechia seen again today as previously noted. Imaging is limited secondary to fetal position. The patient is well aware of the limitations of ultrasound in the    detection of anomalies. Ramona Boland,         Assessment/Plan:   Diagnosis Orders   1. Prenatal care, subsequent pregnancy in third trimester  GLUCOSE TOLERANCE OBSTETRIC    CBC with Auto Differential      2. History of  delivery, currently pregnant        3. Amniotic band in second trimester, single or unspecified fetus        4. Hx of  section        5. History of loop electrical excision procedure (LEEP)        6. History of heroin abuse ([facility] [address]        7. History of         8. Family history of breast cancer        9. Maternal tobacco use in second trimester        10. History of trichomonal vaginitis        11. Synechia vulvae        12. ASCUS with positive high risk HPV cervical        13. Hx of Preeclampsia in postpartum period           Prenatal Care, Subsequent Pregnancy   Reassuring fetal / maternal status today  Aneuploidy / Carrier Screen: declined     AFP: declined   PNL: A pos/ab neg, R Immune, Varicella Immune, HepB neg, HepC negative, HIV neg, syphilis non-reactive, Hgb 11.2, Plt 180, UDS neg, UCx neg, GCCT neg/neg, Pap ASCUS HR other HPV pos 10/10/2022  Anatomy: Lateral placenta, no previa, CL 4.07cm, AIMEE wnl, EFW 46.7 %ile, no gross anatomic abnormalities, Sub-optimal Spine, LVOT, RVOT, AA, cross over, 4 ch heart, stomach.   Male fetus  28 wk: GCT (+)  Hgb Plt nml 3hr GTT normal   Tdap: at 28 wks    GBS: 35-37 wks   Birth Plan: RCS + BS -- see below    Breastfeeding Education: reviewed     History of heroin abuse ([place] [address]     - Hx of Suboxone use -- Self weaned off of Suboxone as she does not desire in pregnancy     - However, has done so using 800mg of Gabapentin daily     - Reviewed r/b/a of both in pregnancy     - Patient was under the impression that a provider who does Subutex was going to give her the Gabapentin     - Reviewed with Dr. Jalil Crenshaw and advised that she only does Subutex     - For Gabapentin would need to enroll in the [place] program     -  working on referral     Hx of  section     - Desires RCS, see below      ASCUS with positive high risk HPV cervical     - Pap smear with rare ASCUS, HR HPV other types positive     - Hx of LEEP     - Colpo 2022 - Low grade acetowhite changes    - Plan to repeat pap/cotesting in 12 months     History of loop electrical excision procedure (LEEP)     - CL at 18 weeks 3.69cm     - CL at 20 weeks 4.07cm     - normal appearing CL at 24 wks      History of     - Plans for RLTCS with BS -- scheduled on 23 at 0800 with Maciej La     - Ethics approved BS, Sterilization for signed      Preeclampsia in postpartum period     - Hx of postpartum preeclampsia     - Baseline labs wnl, PCR 0.1     - Daily ASA     Family history of breast cancer     - Desires RCS with prophylactic salpingectomy     - Approved through Ethics via message from Dr. Maciej La     - Mena Sharif signed at previous visit     Hx of  Delivery   - reviewed options for progesterone supplementation -- declines     Abnormal Ultrasound / ? Band   - region of Synechia noted again today  - will continue US surveillance and growth scans     Follow Up  Return OB precautions reviewed   Return in about 2 weeks (around 2023) for Return OB visit.     Approximately 20 minutes spent in room counseling patient on condition and coordination of care with over 50% in direct face to face counseling.      Prabha Cornell, DO

## 2023-02-01 ENCOUNTER — TELEPHONE (OUTPATIENT)
Dept: OBGYN CLINIC | Age: 33
End: 2023-02-01

## 2023-02-01 PROBLEM — N90.89: Status: RESOLVED | Noted: 2023-01-08 | Resolved: 2023-02-01

## 2023-02-01 LAB
BASOPHILS ABSOLUTE: 0.1 K/UL (ref 0–0.2)
BASOPHILS RELATIVE PERCENT: 0.6 %
EOSINOPHILS ABSOLUTE: 0.1 K/UL (ref 0–0.6)
EOSINOPHILS RELATIVE PERCENT: 1.3 %
GLUCOSE FASTING: 74 MG/DL
GLUCOSE TOLERANCE TEST 1 HOUR: 157 MG/DL
GLUCOSE TOLERANCE TEST 2 HOUR: 97 MG/DL
GLUCOSE TOLERANCE TEST 3 HOUR: 46 MG/DL
HCT VFR BLD CALC: 31.1 % (ref 36–48)
HEMOGLOBIN: 10 G/DL (ref 12–16)
LYMPHOCYTES ABSOLUTE: 2 K/UL (ref 1–5.1)
LYMPHOCYTES RELATIVE PERCENT: 24.1 %
MCH RBC QN AUTO: 30.8 PG (ref 26–34)
MCHC RBC AUTO-ENTMCNC: 32.3 G/DL (ref 31–36)
MCV RBC AUTO: 95.4 FL (ref 80–100)
MONOCYTES ABSOLUTE: 0.3 K/UL (ref 0–1.3)
MONOCYTES RELATIVE PERCENT: 4.3 %
NEUTROPHILS ABSOLUTE: 5.7 K/UL (ref 1.7–7.7)
NEUTROPHILS RELATIVE PERCENT: 69.7 %
PDW BLD-RTO: 13.5 % (ref 12.4–15.4)
PLATELET # BLD: 243 K/UL (ref 135–450)
PMV BLD AUTO: 9.4 FL (ref 5–10.5)
RBC # BLD: 3.26 M/UL (ref 4–5.2)
WBC # BLD: 8.1 K/UL (ref 4–11)

## 2023-02-01 NOTE — TELEPHONE ENCOUNTER
Danielle called to report Critical level. 3 hour GTT result at 46 for the 3 hour draw. Routing to Dr. Asa Allen.

## 2023-02-01 NOTE — PROGRESS NOTES
33 y/o [id number] female at 26 weeks 3 days gestation with [facility] 23 presents for prenatal visit and ultrasound. Pregnancy is complicated by uterine synechiae--lower uterine segment, tobacco use (1/2 PPD), history of heroin use (Subutex use prior to pregnancy)--no current use, Gabapentin use--no current use, history of , history of , history of LEEP procedure, ASCUS pap smear with high risk HPV positive, history of postpartum pre-eclampsia, family history of breast cancer, and elevated early 1 hour GTT with normal 3 hour GTT. Denies vaginal bleeding, loss of fluid, and pelvic pain. Admits to fetal movement. Denies headache, vision changes, and RUQ pain  Denies fever, chills, chest pain, shortness of breath, nausea, vomiting, diarrhea, constipation, dysuria and hematuria. Maternal Wellness Questionnaire reviewed--no concerns. Diagnosis Orders   1. Prenatal care in second trimester        2. History of  delivery, currently pregnant        3. Amniotic band in second trimester, single or unspecified fetus        4. Hx of  section        5. History of loop electrical excision procedure (LEEP)        6. History of heroin abuse ([facility] [address]        7. History of         8. Family history of breast cancer        9. Maternal tobacco use in second trimester          Approximately 20 minutes spent in counseling and coordination of care with over 50% in direct face to face counseling. Growth scan every 4 weeks  Continue daily 81 mg ASA  Repeat  with ovarian cancer risk reducing bilateral salpingectomy planned--approved by Ethics  3 hour GTT, HgA1C next visit  Smoking cessation  Follow up prn and 2 weeks for prenatal visit and lab work.

## 2023-02-14 ENCOUNTER — ROUTINE PRENATAL (OUTPATIENT)
Dept: OBGYN CLINIC | Age: 33
End: 2023-02-14
Payer: COMMERCIAL

## 2023-02-14 VITALS
WEIGHT: 151 LBS | SYSTOLIC BLOOD PRESSURE: 122 MMHG | DIASTOLIC BLOOD PRESSURE: 78 MMHG | BODY MASS INDEX: 21.67 KG/M2 | HEART RATE: 99 BPM

## 2023-02-14 DIAGNOSIS — Z98.890 HISTORY OF LOOP ELECTRICAL EXCISION PROCEDURE (LEEP): ICD-10-CM

## 2023-02-14 DIAGNOSIS — Z80.3 FAMILY HISTORY OF BREAST CANCER: ICD-10-CM

## 2023-02-14 DIAGNOSIS — Z34.83 PRENATAL CARE, SUBSEQUENT PREGNANCY IN THIRD TRIMESTER: Primary | ICD-10-CM

## 2023-02-14 DIAGNOSIS — Z98.891 HX OF CESAREAN SECTION: ICD-10-CM

## 2023-02-14 DIAGNOSIS — O09.899 HISTORY OF PRETERM DELIVERY, CURRENTLY PREGNANT: ICD-10-CM

## 2023-02-14 DIAGNOSIS — O41.8X20 AMNIOTIC BAND IN SECOND TRIMESTER, SINGLE OR UNSPECIFIED FETUS: ICD-10-CM

## 2023-02-14 DIAGNOSIS — Z98.891 HISTORY OF VBAC: ICD-10-CM

## 2023-02-14 DIAGNOSIS — F11.11 HISTORY OF HEROIN ABUSE (HCC): ICD-10-CM

## 2023-02-14 DIAGNOSIS — O99.333 MATERNAL TOBACCO USE IN THIRD TRIMESTER: ICD-10-CM

## 2023-02-14 PROCEDURE — 4004F PT TOBACCO SCREEN RCVD TLK: CPT | Performed by: OBSTETRICS & GYNECOLOGY

## 2023-02-14 PROCEDURE — G8428 CUR MEDS NOT DOCUMENT: HCPCS | Performed by: OBSTETRICS & GYNECOLOGY

## 2023-02-14 PROCEDURE — 99212 OFFICE O/P EST SF 10 MIN: CPT | Performed by: OBSTETRICS & GYNECOLOGY

## 2023-02-14 PROCEDURE — G8420 CALC BMI NORM PARAMETERS: HCPCS | Performed by: OBSTETRICS & GYNECOLOGY

## 2023-02-14 PROCEDURE — G8484 FLU IMMUNIZE NO ADMIN: HCPCS | Performed by: OBSTETRICS & GYNECOLOGY

## 2023-02-14 NOTE — PROGRESS NOTES
34 y.o. [id number] at 30w2d EGA Estimated Date of Delivery: 23 here for LISE:     Pt seen and examined. No concerns/complaints. Denies VB, LOF, CTX. Endorses (+) FM. Denies fevers / chills / chest pain / shortness of breath. Denies HA, changes with vision, RUQ pain, edema. MWQ reviewed. Objective:  /78   Pulse 99   Wt 151 lb (68.5 kg)   LMP 2022   BMI 21.67 kg/m²   Gen: AO, NAD  Abd: Soft, NT, gravid   Ext: Mild LE edema  OMM: Increased lumbar lordosis    Assessment/Plan:   Diagnosis Orders   1. Prenatal care, subsequent pregnancy in third trimester        2. History of  delivery, currently pregnant        3. Amniotic band in second trimester, single or unspecified fetus        4. Hx of  section        5. History of loop electrical excision procedure (LEEP)        6. History of heroin abuse ([facility] [address]        7. History of         8. Family history of breast cancer        9. Maternal tobacco use in third trimester           Prenatal Care, Subsequent Pregnancy   Reassuring fetal / maternal status today  Aneuploidy / Carrier Screen: declined     AFP: declined   PNL: A pos/ab neg, R Immune, Varicella Immune, HepB neg, HepC negative, HIV neg, syphilis non-reactive, Hgb 11.2, Plt 180, UDS neg, UCx neg, GCCT neg/neg, Pap ASCUS HR other HPV pos 10/10/2022  Anatomy: Lateral placenta, no previa, CL 4.07cm, AIMEE wnl, EFW 46.7 %ile, no gross anatomic abnormalities, Sub-optimal Spine, LVOT, RVOT, AA, cross over, 4 ch heart, stomach.   Male fetus  28 wk: GCT (+)  Hgb Plt nml   3hr GTT normal   Tdap: at 28 wks    GBS: 35-37 wks   Birth Plan: RCS + BS -- see below    Breastfeeding Education: reviewed     History of heroin abuse ([facility] [address]     - Hx of Suboxone use -- Self weaned off of Suboxone as she does not desire in pregnancy     - However, has done so using 800mg of Gabapentin daily     - Reviewed r/b/a of both in pregnancy     - Patient was under the impression that a provider who does Subutex was going to give her the Gabapentin     - Reviewed with Dr. Lise Moser and advised that she only does Subutex / for Gabapentin would need to enroll in the [place] program >> referral pending      Hx of  section     - Desires RCS, see below      ASCUS with positive high risk HPV cervical     - Pap smear with rare ASCUS, HR HPV other types positive     - Hx of LEEP     - Colpo 2022 - Low grade acetowhite changes    - Plan to repeat pap/cotesting in 12 months     History of loop electrical excision procedure (LEEP)     - CL at 18 weeks 3.69cm     - CL at 20 weeks 4.07cm     - normal appearing CL at 24 wks      History of     - Plans for RLTCS with BS -- scheduled on 23 at 0800 with Camryn Grove     - Ethics approved BS, Sterilization for signed      Preeclampsia in postpartum period     - Hx of postpartum preeclampsia     - Baseline labs wnl, PCR 0.1     - Daily ASA     Family history of breast cancer     - Desires RCS with prophylactic salpingectomy     - Approved through Ethics via message from Dr. Vinay Mayes signed at previous visit     Hx of  Delivery   - reviewed options for progesterone supplementation -- declines     Abnormal Ultrasound / ? Band   - region of Synechia noted again today  - will continue US surveillance and growth scans     Follow Up  Return OB precautions reviewed   Return in about 2 weeks (around 2023) for Return OB visit. Approximately 10 minutes spent in room counseling patient on condition and coordination of care with over 50% in direct face to face counseling.      Kamille Tena DO

## 2023-03-02 ENCOUNTER — ROUTINE PRENATAL (OUTPATIENT)
Dept: OBGYN CLINIC | Age: 33
End: 2023-03-02
Payer: COMMERCIAL

## 2023-03-02 DIAGNOSIS — O99.333 MATERNAL TOBACCO USE IN THIRD TRIMESTER: ICD-10-CM

## 2023-03-02 DIAGNOSIS — Z36.89 ENCOUNTER FOR ULTRASOUND TO CHECK FETAL GROWTH: ICD-10-CM

## 2023-03-02 DIAGNOSIS — O41.8X20 AMNIOTIC BAND IN SECOND TRIMESTER, SINGLE OR UNSPECIFIED FETUS: ICD-10-CM

## 2023-03-02 DIAGNOSIS — O09.899 HISTORY OF PRETERM DELIVERY, CURRENTLY PREGNANT: ICD-10-CM

## 2023-03-02 DIAGNOSIS — F11.11 HISTORY OF HEROIN ABUSE (HCC): ICD-10-CM

## 2023-03-02 DIAGNOSIS — Z98.891 HX OF CESAREAN SECTION: ICD-10-CM

## 2023-03-02 DIAGNOSIS — R87.610 ASCUS WITH POSITIVE HIGH RISK HPV CERVICAL: ICD-10-CM

## 2023-03-02 DIAGNOSIS — Z98.890 HISTORY OF LOOP ELECTRICAL EXCISION PROCEDURE (LEEP): ICD-10-CM

## 2023-03-02 DIAGNOSIS — R87.810 ASCUS WITH POSITIVE HIGH RISK HPV CERVICAL: ICD-10-CM

## 2023-03-02 DIAGNOSIS — Z86.19 HISTORY OF TRICHOMONAL VAGINITIS: ICD-10-CM

## 2023-03-02 DIAGNOSIS — Z98.891 HISTORY OF VBAC: ICD-10-CM

## 2023-03-02 DIAGNOSIS — Z80.3 FAMILY HISTORY OF BREAST CANCER: ICD-10-CM

## 2023-03-02 DIAGNOSIS — Z34.83 PRENATAL CARE, SUBSEQUENT PREGNANCY IN THIRD TRIMESTER: Primary | ICD-10-CM

## 2023-03-02 PROCEDURE — G8427 DOCREV CUR MEDS BY ELIG CLIN: HCPCS | Performed by: OBSTETRICS & GYNECOLOGY

## 2023-03-02 PROCEDURE — 99213 OFFICE O/P EST LOW 20 MIN: CPT | Performed by: OBSTETRICS & GYNECOLOGY

## 2023-03-02 PROCEDURE — G8484 FLU IMMUNIZE NO ADMIN: HCPCS | Performed by: OBSTETRICS & GYNECOLOGY

## 2023-03-02 PROCEDURE — G8420 CALC BMI NORM PARAMETERS: HCPCS | Performed by: OBSTETRICS & GYNECOLOGY

## 2023-03-02 PROCEDURE — 4004F PT TOBACCO SCREEN RCVD TLK: CPT | Performed by: OBSTETRICS & GYNECOLOGY

## 2023-03-04 NOTE — PROGRESS NOTES
34 y.o. [id number] at [address] [facility] Estimated Date of Delivery: 4/23/23 here for LISE:     Pt seen and examined. No concerns/complaints. Admits to fatigue  Denies VB, LOF, CTX. Endorses (+) FM. Denies fevers / chills / chest pain / shortness of breath. Denies HA, changes with vision, RUQ pain, edema. MWQ reviewed. Growth ultrasound today    Objective:  /78   Pulse 96   Wt 156 lb 3.2 oz (70.9 kg)   LMP 07/17/2022   BMI 22.41 kg/m²   Gen: AO, NAD  Abd: Soft, NT, gravid   Ext: Mild LE edema  OMM: Increased lumbar lordosis    Images:   OBSTETRICAL ULTRASOUND GROWTH     DATE: 3/2/23     PHYSICIAN: LEATHA Styles.      SONOGRAPHER: ROSALIO Daniel RDMS     INDICATION: Growth, Synechia placenta     TYPE OF SCAN: abdominal     FINDINGS:  A single viable intrauterine pregnancy is noted in cephalic presentation. Cardiac and somatic activity are noted. The following values were obtained:              Fetal heart rate                                               135bpm              BPD                                                                 8.27cm                        63.5 %              Head Circumference                                       31.24cm                      76.2 %               Abdominal Circumference                              29.77cm                      81.8 %              Femur Length                                                  6.05cm                        12.8 %              Amniotic fluid index                                         13.96cm              EFW                                                                2135g              59.0 percentile     Amniotic fluid volume is normal. Based on sonographic criteria the estimated fetal age is 33weeks and 3days with EDC of 4/17/23. There is a 6 day discordance with the established EDC of 4/23/23. The patient has a right lateral placenta that is adequate distance in relation to the internal cervical os.  The evaluation of the lower uterine segment and cervix reveals normal appearing anatomy. The uterus is unremarkable/gravid. Maternal ovaries and adnexae are not well visualized due to the size of the uterus and patient's gravid state. Anatomy seen includes: heart, stomach, kidneys, bladder     IMPRESSION:  Single live IUP in the third trimester. Adequate interval fetal growth. BPP . Synechia is again noted. Imaging is limited secondary to fetal position. The patient is well aware of the limitations of ultrasound in the detection of anomalies. Romel Medina DO     Assessment/Plan:   Diagnosis Orders   1. Prenatal care, subsequent pregnancy in third trimester        2. Encounter for ultrasound to check fetal growth        3. History of  delivery, currently pregnant        4. Amniotic band in second trimester, single or unspecified fetus        5. Hx of  section        6. History of loop electrical excision procedure (LEEP)        7. History of heroin abuse ([facility] [address]        8. History of         9. Family history of breast cancer        10. Maternal tobacco use in third trimester        6. Hx of Preeclampsia in postpartum period        15. History of trichomonal vaginitis             Prenatal Care, Subsequent Pregnancy   Reassuring fetal / maternal status today  Aneuploidy / Carrier Screen: declined     AFP: declined   PNL: A pos/ab neg, R Immune, Varicella Immune, HepB neg, HepC negative, HIV neg, syphilis non-reactive, Hgb 11.2, Plt 180, UDS neg, UCx neg, GCCT neg/neg, Pap ASCUS HR other HPV pos 10/10/2022  Anatomy: Lateral placenta, no previa, CL 4.07cm, AIMEE wnl, EFW 46.7 %ile, no gross anatomic abnormalities, Sub-optimal Spine, LVOT, RVOT, AA, cross over, 4 ch heart, stomach.   Male fetus  28 wk: GCT (+)  Hgb Plt nml   3hr GTT normal   Tdap: at 28 wks    GBS: 35-37 wks   Birth Plan: RCS + BS -- see below    Breastfeeding Education: reviewed     History of heroin abuse ([facility] [address]     - Hx of Suboxone use -- Self weaned off of Suboxone as she does not desire in pregnancy     - However, has done so using 800mg of Gabapentin daily     - Reviewed r/b/a of both in pregnancy     - Patient was under the impression that a provider who does Subutex was going to give her the Gabapentin     - Reviewed with Dr. Eliel Antonio and advised that she only does Subutex / for Gabapentin would need to enroll in the [place] program >> referral pending      Hx of  section     - Desires RCS, see below      ASCUS with positive high risk HPV cervical     - Pap smear with rare ASCUS, HR HPV other types positive     - Hx of LEEP     - Colpo 2022 - Low grade acetowhite changes    - Plan to repeat pap/cotesting in 12 months     History of loop electrical excision procedure (LEEP)     - CL at 18 weeks 3.69cm / CL at 20 weeks 4.07cm /  normal appearing CL at 24 wks      History of     - Plans for RLTCS with BS -- scheduled on 23 at 0800 with Pooja Mendiola     - Ethics approved BS, Sterilization form has been signed      Preeclampsia in postpartum period     - Hx of postpartum preeclampsia     - Baseline labs wnl, PCR 0.1     - Daily ASA     Family history of breast cancer     - Desires RCS with prophylactic salpingectomy     - Approved through Ethics via message from Dr. Pooja Mendiola     - Baljit Enamorado signed at previous visit     Hx of  Delivery   - reviewed options for progesterone supplementation -- declines     Abnormal Ultrasound / ? Band   - region of Synechia noted again today  - will continue US surveillance and growth scans    - 3/7/23 EFW 59.0%ile, AIMEE 13.96cm, Vertex, BPP 8/8   - Repeat due around 2023     Follow Up  Return OB precautions reviewed   Return in about 2 weeks (around 3/16/2023) for Return OB visit. Approximately 20 minutes spent in room counseling patient on condition and coordination of care with over 50% in direct face to face counseling.      Vladimir Messer DO

## 2023-03-07 VITALS
DIASTOLIC BLOOD PRESSURE: 78 MMHG | BODY MASS INDEX: 22.41 KG/M2 | SYSTOLIC BLOOD PRESSURE: 124 MMHG | HEART RATE: 96 BPM | WEIGHT: 156.2 LBS

## 2023-03-14 ENCOUNTER — ROUTINE PRENATAL (OUTPATIENT)
Dept: OBGYN CLINIC | Age: 33
End: 2023-03-14
Payer: COMMERCIAL

## 2023-03-14 VITALS
HEART RATE: 97 BPM | WEIGHT: 160.8 LBS | SYSTOLIC BLOOD PRESSURE: 104 MMHG | TEMPERATURE: 97.8 F | DIASTOLIC BLOOD PRESSURE: 76 MMHG | BODY MASS INDEX: 23.07 KG/M2

## 2023-03-14 DIAGNOSIS — Z80.3 FAMILY HISTORY OF BREAST CANCER: ICD-10-CM

## 2023-03-14 DIAGNOSIS — O09.899 HISTORY OF PRETERM DELIVERY, CURRENTLY PREGNANT: ICD-10-CM

## 2023-03-14 DIAGNOSIS — Z86.19 HISTORY OF TRICHOMONAL VAGINITIS: ICD-10-CM

## 2023-03-14 DIAGNOSIS — R87.610 ASCUS WITH POSITIVE HIGH RISK HPV CERVICAL: ICD-10-CM

## 2023-03-14 DIAGNOSIS — Z98.891 HISTORY OF VBAC: ICD-10-CM

## 2023-03-14 DIAGNOSIS — Z98.890 HISTORY OF LOOP ELECTRICAL EXCISION PROCEDURE (LEEP): ICD-10-CM

## 2023-03-14 DIAGNOSIS — Z34.83 PRENATAL CARE, SUBSEQUENT PREGNANCY IN THIRD TRIMESTER: Primary | ICD-10-CM

## 2023-03-14 DIAGNOSIS — O41.8X20 AMNIOTIC BAND IN SECOND TRIMESTER, SINGLE OR UNSPECIFIED FETUS: ICD-10-CM

## 2023-03-14 DIAGNOSIS — O99.333 MATERNAL TOBACCO USE IN THIRD TRIMESTER: ICD-10-CM

## 2023-03-14 DIAGNOSIS — F11.11 HISTORY OF HEROIN ABUSE (HCC): ICD-10-CM

## 2023-03-14 DIAGNOSIS — R87.810 ASCUS WITH POSITIVE HIGH RISK HPV CERVICAL: ICD-10-CM

## 2023-03-14 DIAGNOSIS — Z98.891 HX OF CESAREAN SECTION: ICD-10-CM

## 2023-03-14 PROCEDURE — G8484 FLU IMMUNIZE NO ADMIN: HCPCS | Performed by: OBSTETRICS & GYNECOLOGY

## 2023-03-14 PROCEDURE — G8427 DOCREV CUR MEDS BY ELIG CLIN: HCPCS | Performed by: OBSTETRICS & GYNECOLOGY

## 2023-03-14 PROCEDURE — 4004F PT TOBACCO SCREEN RCVD TLK: CPT | Performed by: OBSTETRICS & GYNECOLOGY

## 2023-03-14 PROCEDURE — G8420 CALC BMI NORM PARAMETERS: HCPCS | Performed by: OBSTETRICS & GYNECOLOGY

## 2023-03-14 PROCEDURE — 99212 OFFICE O/P EST SF 10 MIN: CPT | Performed by: OBSTETRICS & GYNECOLOGY

## 2023-03-14 NOTE — PROGRESS NOTES
34 y.o. [id number] at 34w2d EGA Estimated Date of Delivery: 23 here for LISE:     Pt seen and examined. No concerns/complaints. Denies VB, LOF, CTX. Endorses (+) FM. Denies fevers / chills / chest pain / shortness of breath. Denies HA, changes with vision, RUQ pain, edema. MWQ reviewed. Objective:  /76   Pulse 97   Temp 97.8 °F (36.6 °C) (Infrared)   Wt 160 lb 12.8 oz (72.9 kg)   LMP 2022   BMI 23.07 kg/m²   Gen: AO, NAD  Abd: Soft, NT, gravid   Ext: Mild LE edema  OMM: Increased lumbar lordosis    Assessment/Plan:   Diagnosis Orders   1. Prenatal care, subsequent pregnancy in third trimester        2. History of  delivery, currently pregnant        3. Amniotic band in second trimester, single or unspecified fetus        4. Hx of  section        5. History of loop electrical excision procedure (LEEP)        6. History of heroin abuse ([facility] [address]        7. History of         8. Family history of breast cancer        9. Maternal tobacco use in third trimester        10. Hx of Preeclampsia in postpartum period        6. History of trichomonal vaginitis        12. ASCUS with positive high risk HPV cervical             Prenatal Care, Subsequent Pregnancy   Reassuring fetal / maternal status today  Aneuploidy / Carrier Screen: declined     AFP: declined   PNL: A pos/ab neg, R Immune, Varicella Immune, HepB neg, HepC negative, HIV neg, syphilis non-reactive, Hgb 11.2, Plt 180, UDS neg, UCx neg, GCCT neg/neg, Pap ASCUS HR other HPV pos 10/10/2022  Anatomy: Lateral placenta, no previa, CL 4.07cm, AIMEE wnl, EFW 46.7 %ile, no gross anatomic abnormalities, Sub-optimal Spine, LVOT, RVOT, AA, cross over, 4 ch heart, stomach.   Male fetus  28 wk: GCT (+)  Hgb Plt nml   3hr GTT normal   Tdap: at 28 wks    GBS: 35-37 wks   Birth Plan: RCS + BS -- see below    Breastfeeding Education: reviewed     History of heroin abuse ([facility] [address]     - Hx of Suboxone use -- Self weaned off of Suboxone as she does not desire in pregnancy     - However, has done so using 800mg of Gabapentin daily     - Reviewed r/b/a of both in pregnancy     - Patient was under the impression that a provider who does Subutex was going to give her the Gabapentin     - Reviewed with Dr. Dianna Hancock and advised that she only does Subutex / for Gabapentin would need to enroll in the [facility] program >> referral pending      Hx of  section     - Desires RCS, see below      ASCUS with positive high risk HPV cervical     - Pap smear with rare ASCUS, HR HPV other types positive     - Hx of LEEP     - Colpo 2022 - Low grade acetowhite changes    - Plan to repeat pap/cotesting in 12 months     History of loop electrical excision procedure (LEEP)     - CL at 18 weeks 3.69cm / CL at 20 weeks 4.07cm /  normal appearing CL at 24 wks      History of     - Plans for RLTCS with BS -- scheduled on 23 at 0800 with Alisa Jaquez     - Ethics approved BS, Sterilization form has been signed      Preeclampsia in postpartum period     - Hx of postpartum preeclampsia     - Baseline labs wnl, PCR 0.1     - Daily ASA     Family history of breast cancer     - Desires RCS with prophylactic salpingectomy     - Approved through Ethics via message from Dr. Alisa Jaquez     - Samira Zuniga signed at previous visit     Hx of  Delivery   - reviewed options for progesterone supplementation -- declines     Abnormal Ultrasound / ? Band   - region of Synechia noted again today  - will continue US surveillance and growth scans    - 3/7/23 EFW 59.0%ile, AIMEE 13.96cm, Vertex, BPP 8/   - Repeat due around 2023     Follow Up  Return OB precautions reviewed   Return in about 2 weeks (around 3/28/2023) for Return OB visit. Approximately 10 minutes spent in room counseling patient on condition and coordination of care with over 50% in direct face to face counseling.      Valentino Plummer, DO

## 2023-03-30 ENCOUNTER — TELEPHONE (OUTPATIENT)
Dept: OBGYN CLINIC | Age: 33
End: 2023-03-30

## 2023-03-30 ENCOUNTER — ROUTINE PRENATAL (OUTPATIENT)
Dept: OBGYN CLINIC | Age: 33
End: 2023-03-30
Payer: COMMERCIAL

## 2023-03-30 VITALS
BODY MASS INDEX: 23.39 KG/M2 | SYSTOLIC BLOOD PRESSURE: 130 MMHG | HEART RATE: 99 BPM | DIASTOLIC BLOOD PRESSURE: 80 MMHG | WEIGHT: 163 LBS

## 2023-03-30 DIAGNOSIS — O99.333 MATERNAL TOBACCO USE IN THIRD TRIMESTER: ICD-10-CM

## 2023-03-30 DIAGNOSIS — Z98.890 HISTORY OF LOOP ELECTRICAL EXCISION PROCEDURE (LEEP): ICD-10-CM

## 2023-03-30 DIAGNOSIS — F11.11 HISTORY OF HEROIN ABUSE (HCC): ICD-10-CM

## 2023-03-30 DIAGNOSIS — R87.610 ASCUS WITH POSITIVE HIGH RISK HPV CERVICAL: ICD-10-CM

## 2023-03-30 DIAGNOSIS — Z34.83 PRENATAL CARE, SUBSEQUENT PREGNANCY IN THIRD TRIMESTER: Primary | ICD-10-CM

## 2023-03-30 DIAGNOSIS — Z80.3 FAMILY HISTORY OF BREAST CANCER: ICD-10-CM

## 2023-03-30 DIAGNOSIS — R87.810 ASCUS WITH POSITIVE HIGH RISK HPV CERVICAL: ICD-10-CM

## 2023-03-30 DIAGNOSIS — Z86.19 HISTORY OF TRICHOMONAL VAGINITIS: ICD-10-CM

## 2023-03-30 DIAGNOSIS — O41.8X20 AMNIOTIC BAND IN SECOND TRIMESTER, SINGLE OR UNSPECIFIED FETUS: ICD-10-CM

## 2023-03-30 DIAGNOSIS — Z98.891 HX OF CESAREAN SECTION: ICD-10-CM

## 2023-03-30 DIAGNOSIS — O09.899 HISTORY OF PRETERM DELIVERY, CURRENTLY PREGNANT: ICD-10-CM

## 2023-03-30 DIAGNOSIS — Z36.89 ENCOUNTER FOR ULTRASOUND TO CHECK FETAL GROWTH: ICD-10-CM

## 2023-03-30 DIAGNOSIS — Z98.891 HISTORY OF VBAC: ICD-10-CM

## 2023-03-30 DIAGNOSIS — Z36.9 ANTENATAL SCREENING ENCOUNTER: ICD-10-CM

## 2023-03-30 PROCEDURE — G8420 CALC BMI NORM PARAMETERS: HCPCS | Performed by: OBSTETRICS & GYNECOLOGY

## 2023-03-30 PROCEDURE — 4004F PT TOBACCO SCREEN RCVD TLK: CPT | Performed by: OBSTETRICS & GYNECOLOGY

## 2023-03-30 PROCEDURE — G8484 FLU IMMUNIZE NO ADMIN: HCPCS | Performed by: OBSTETRICS & GYNECOLOGY

## 2023-03-30 PROCEDURE — G8428 CUR MEDS NOT DOCUMENT: HCPCS | Performed by: OBSTETRICS & GYNECOLOGY

## 2023-03-30 PROCEDURE — 99213 OFFICE O/P EST LOW 20 MIN: CPT | Performed by: OBSTETRICS & GYNECOLOGY

## 2023-03-30 NOTE — TELEPHONE ENCOUNTER
Pt called reporting she has light bleeding and small clots when she wipes. Not enough to saturate a pad, pt reports no loss of fluids at this time, pt reports no contractions at this time. Spoke with Linda Pires, doctor recommends monitoring for now, pt to report to L&D triage if she starts to experience painful contractions, loss of fluids or if bleeding becomes heavy enough to saturate a pad. Pt was seen in office today by primary OB, pt reported she was 3cm dilated and 50% effaced.

## 2023-03-30 NOTE — PROGRESS NOTES
34 y.o. [id number] at 36w4d  EGA Estimated Date of Delivery: 4/23/23 here for LISE:     Pt seen and examined. Denies VB, LOF, CTX -- but does admit to abdominal pain yesterday and fatigue. Endorses (+) FM. Denies fevers / chills / chest pain / shortness of breath. Denies HA, changes with vision, RUQ pain, edema. MWQ reviewed. Objective:  /80   Pulse 99   Wt 163 lb (73.9 kg)   LMP 07/17/2022   BMI 23.39 kg/m²   Gen: AO, NAD  Abd: Soft, NT, gravid    VE: 3/50/-2   GBS pending   Ext: Mild LE edema  OMM: Increased lumbar lordosis    OBSTETRICAL ULTRASOUND GROWTH     DATE: 03/30/2023     PHYSICIAN: LEATHA Mittal.      SONOGRAPHER: Liana Linder RDMS     INDICATION: Growth,      TYPE OF SCAN: abdominal     FINDINGS:  A single viable intrauterine pregnancy is noted in cephalic presentation. Cardiac and somatic activity are noted. The following values were obtained:              Fetal heart rate                                               158bpm              BPD                                                                 8.93cm                        49.9 %              Head Circumference                                       33.29cm                      57.2 %               Abdominal Circumference                              33.78cm                      87.6 %              Femur Length                                                  7.01cm                        31.0 %              Amniotic fluid index                                         9.07cm              EFW                                                                3123g              68.5 percentile     Amniotic fluid volume is normal. Based on sonographic criteria the estimated fetal age is 37weeks and [address] with EDC of 04/20/2023. There is a 3 day discordance with the established EDC of 04/23/2023. The patient has a right lateral placenta that is adequate distance in relation to the internal cervical os.  The evaluation of

## 2023-03-30 NOTE — TELEPHONE ENCOUNTER
Reviewed symptoms with patient -- recommend watching for now, she was checked today and this is likely the source of her spotting. Encouraged her to call back if symptoms worsen.      Katherine

## 2023-04-02 LAB — GP B STREP SPEC QL CULT: NORMAL

## 2023-04-03 ENCOUNTER — ROUTINE PRENATAL (OUTPATIENT)
Dept: OBGYN CLINIC | Age: 33
End: 2023-04-03
Payer: COMMERCIAL

## 2023-04-03 ENCOUNTER — HOSPITAL ENCOUNTER (OUTPATIENT)
Age: 33
Setting detail: OBSERVATION
Discharge: HOME | End: 2023-04-04
Attending: OBSTETRICS & GYNECOLOGY | Admitting: OBSTETRICS & GYNECOLOGY
Payer: COMMERCIAL

## 2023-04-03 VITALS — WEIGHT: 164 LBS | SYSTOLIC BLOOD PRESSURE: 180 MMHG | DIASTOLIC BLOOD PRESSURE: 94 MMHG | BODY MASS INDEX: 23.53 KG/M2

## 2023-04-03 DIAGNOSIS — R87.610 ASCUS WITH POSITIVE HIGH RISK HPV CERVICAL: ICD-10-CM

## 2023-04-03 DIAGNOSIS — Z34.83 PRENATAL CARE, SUBSEQUENT PREGNANCY IN THIRD TRIMESTER: Primary | ICD-10-CM

## 2023-04-03 DIAGNOSIS — O99.333 MATERNAL TOBACCO USE IN THIRD TRIMESTER: ICD-10-CM

## 2023-04-03 DIAGNOSIS — O09.899 HISTORY OF PRETERM DELIVERY, CURRENTLY PREGNANT: ICD-10-CM

## 2023-04-03 DIAGNOSIS — O41.8X20 AMNIOTIC BAND IN SECOND TRIMESTER, SINGLE OR UNSPECIFIED FETUS: ICD-10-CM

## 2023-04-03 DIAGNOSIS — Z80.3 FAMILY HISTORY OF BREAST CANCER: ICD-10-CM

## 2023-04-03 DIAGNOSIS — F11.11 HISTORY OF HEROIN ABUSE (HCC): ICD-10-CM

## 2023-04-03 DIAGNOSIS — O16.3 ELEVATED BLOOD PRESSURE AFFECTING PREGNANCY IN THIRD TRIMESTER, ANTEPARTUM: ICD-10-CM

## 2023-04-03 DIAGNOSIS — Z86.19 HISTORY OF TRICHOMONAL VAGINITIS: ICD-10-CM

## 2023-04-03 DIAGNOSIS — Z98.891 HISTORY OF VBAC: ICD-10-CM

## 2023-04-03 DIAGNOSIS — R87.810 ASCUS WITH POSITIVE HIGH RISK HPV CERVICAL: ICD-10-CM

## 2023-04-03 DIAGNOSIS — Z98.890 HISTORY OF LOOP ELECTRICAL EXCISION PROCEDURE (LEEP): ICD-10-CM

## 2023-04-03 DIAGNOSIS — Z98.891 HX OF CESAREAN SECTION: ICD-10-CM

## 2023-04-03 LAB
ALBUMIN SERPL-MCNC: 3.5 G/DL (ref 3.4–5)
ALBUMIN/GLOB SERPL: 1.1 {RATIO} (ref 1.1–2.2)
ALP SERPL-CCNC: 146 U/L (ref 40–129)
ALT SERPL-CCNC: <5 U/L (ref 10–40)
AMPHETAMINES UR QL SCN>1000 NG/ML: NORMAL
ANION GAP SERPL CALCULATED.3IONS-SCNC: 11 MMOL/L (ref 3–16)
AST SERPL-CCNC: 8 U/L (ref 15–37)
BARBITURATES UR QL SCN>200 NG/ML: NORMAL
BASOPHILS # BLD: 0.1 K/UL (ref 0–0.2)
BASOPHILS NFR BLD: 0.8 %
BENZODIAZ UR QL SCN>200 NG/ML: NORMAL
BILIRUB SERPL-MCNC: 0.4 MG/DL (ref 0–1)
BUN SERPL-MCNC: 7 MG/DL (ref 7–20)
BUPRENORPHINE+NOR UR QL SCN: NORMAL
CALCIUM SERPL-MCNC: 8.8 MG/DL (ref 8.3–10.6)
CANNABINOIDS UR QL SCN>50 NG/ML: NORMAL
CHLORIDE SERPL-SCNC: 104 MMOL/L (ref 99–110)
CO2 SERPL-SCNC: 21 MMOL/L (ref 21–32)
COCAINE UR QL SCN: NORMAL
CREAT SERPL-MCNC: <0.5 MG/DL (ref 0.6–1.1)
CREAT UR-MCNC: 16 MG/DL (ref 28–259)
DEPRECATED RDW RBC AUTO: 13.2 % (ref 12.4–15.4)
DRUG SCREEN COMMENT UR-IMP: NORMAL
EOSINOPHIL # BLD: 0.1 K/UL (ref 0–0.6)
EOSINOPHIL NFR BLD: 1.3 %
FENTANYL SCREEN, URINE: NORMAL
GFR SERPLBLD CREATININE-BSD FMLA CKD-EPI: >60 ML/MIN/{1.73_M2}
GLUCOSE SERPL-MCNC: 74 MG/DL (ref 70–99)
HCT VFR BLD AUTO: 30.4 % (ref 36–48)
HGB BLD-MCNC: 10.5 G/DL (ref 12–16)
LDH SERPL L TO P-CCNC: 134 U/L (ref 100–190)
LYMPHOCYTES # BLD: 2.6 K/UL (ref 1–5.1)
LYMPHOCYTES NFR BLD: 23 %
MCH RBC QN AUTO: 31.2 PG (ref 26–34)
MCHC RBC AUTO-ENTMCNC: 34.6 G/DL (ref 31–36)
MCV RBC AUTO: 90.3 FL (ref 80–100)
METHADONE UR QL SCN>300 NG/ML: NORMAL
MONOCYTES # BLD: 0.5 K/UL (ref 0–1.3)
MONOCYTES NFR BLD: 4.6 %
NEUTROPHILS # BLD: 8.1 K/UL (ref 1.7–7.7)
NEUTROPHILS NFR BLD: 70.3 %
OPIATES UR QL SCN>300 NG/ML: NORMAL
OXYCODONE UR QL SCN: NORMAL
PCP UR QL SCN>25 NG/ML: NORMAL
PH UR STRIP: 5 [PH]
PLATELET # BLD AUTO: 295 K/UL (ref 135–450)
PMV BLD AUTO: 10.3 FL (ref 5–10.5)
POTASSIUM SERPL-SCNC: 4 MMOL/L (ref 3.5–5.1)
PROT SERPL-MCNC: 6.8 G/DL (ref 6.4–8.2)
PROT UR-MCNC: <4 MG/DL
PROT/CREAT UR-RTO: ABNORMAL MG/DL
RBC # BLD AUTO: 3.37 M/UL (ref 4–5.2)
SODIUM SERPL-SCNC: 136 MMOL/L (ref 136–145)
SPECIMEN STATUS: NORMAL
URATE SERPL-MCNC: 4.5 MG/DL (ref 2.6–6)
WBC # BLD AUTO: 11.5 K/UL (ref 4–11)

## 2023-04-03 PROCEDURE — 2580000003 HC RX 258: Performed by: OBSTETRICS & GYNECOLOGY

## 2023-04-03 PROCEDURE — 96361 HYDRATE IV INFUSION ADD-ON: CPT

## 2023-04-03 PROCEDURE — 99222 1ST HOSP IP/OBS MODERATE 55: CPT | Performed by: OBSTETRICS & GYNECOLOGY

## 2023-04-03 PROCEDURE — 83615 LACTATE (LD) (LDH) ENZYME: CPT

## 2023-04-03 PROCEDURE — G0378 HOSPITAL OBSERVATION PER HR: HCPCS

## 2023-04-03 PROCEDURE — 99212 OFFICE O/P EST SF 10 MIN: CPT | Performed by: OBSTETRICS & GYNECOLOGY

## 2023-04-03 PROCEDURE — 82570 ASSAY OF URINE CREATININE: CPT

## 2023-04-03 PROCEDURE — 85025 COMPLETE CBC W/AUTO DIFF WBC: CPT

## 2023-04-03 PROCEDURE — 80053 COMPREHEN METABOLIC PANEL: CPT

## 2023-04-03 PROCEDURE — 4004F PT TOBACCO SCREEN RCVD TLK: CPT | Performed by: OBSTETRICS & GYNECOLOGY

## 2023-04-03 PROCEDURE — 84550 ASSAY OF BLOOD/URIC ACID: CPT

## 2023-04-03 PROCEDURE — 96360 HYDRATION IV INFUSION INIT: CPT

## 2023-04-03 PROCEDURE — 84156 ASSAY OF PROTEIN URINE: CPT

## 2023-04-03 PROCEDURE — 1220000000 HC SEMI PRIVATE OB R&B

## 2023-04-03 PROCEDURE — 80307 DRUG TEST PRSMV CHEM ANLYZR: CPT

## 2023-04-03 PROCEDURE — G8420 CALC BMI NORM PARAMETERS: HCPCS | Performed by: OBSTETRICS & GYNECOLOGY

## 2023-04-03 PROCEDURE — G8428 CUR MEDS NOT DOCUMENT: HCPCS | Performed by: OBSTETRICS & GYNECOLOGY

## 2023-04-03 RX ORDER — SODIUM CHLORIDE 0.9 % (FLUSH) 0.9 %
5-40 SYRINGE (ML) INJECTION PRN
Status: DISCONTINUED | OUTPATIENT
Start: 2023-04-03 | End: 2023-04-04 | Stop reason: HOSPADM

## 2023-04-03 RX ORDER — SODIUM CHLORIDE 9 MG/ML
INJECTION, SOLUTION INTRAVENOUS PRN
Status: DISCONTINUED | OUTPATIENT
Start: 2023-04-03 | End: 2023-04-04 | Stop reason: HOSPADM

## 2023-04-03 RX ORDER — SODIUM CHLORIDE, SODIUM LACTATE, POTASSIUM CHLORIDE, CALCIUM CHLORIDE 600; 310; 30; 20 MG/100ML; MG/100ML; MG/100ML; MG/100ML
INJECTION, SOLUTION INTRAVENOUS CONTINUOUS
Status: DISCONTINUED | OUTPATIENT
Start: 2023-04-03 | End: 2023-04-04 | Stop reason: HOSPADM

## 2023-04-03 RX ORDER — ONDANSETRON 2 MG/ML
4 INJECTION INTRAMUSCULAR; INTRAVENOUS EVERY 6 HOURS PRN
Status: DISCONTINUED | OUTPATIENT
Start: 2023-04-03 | End: 2023-04-04 | Stop reason: HOSPADM

## 2023-04-03 RX ORDER — ONDANSETRON 4 MG/1
4 TABLET, ORALLY DISINTEGRATING ORAL EVERY 8 HOURS PRN
Status: DISCONTINUED | OUTPATIENT
Start: 2023-04-03 | End: 2023-04-04 | Stop reason: HOSPADM

## 2023-04-03 RX ORDER — SODIUM CHLORIDE 0.9 % (FLUSH) 0.9 %
5-40 SYRINGE (ML) INJECTION EVERY 12 HOURS SCHEDULED
Status: DISCONTINUED | OUTPATIENT
Start: 2023-04-03 | End: 2023-04-04 | Stop reason: HOSPADM

## 2023-04-03 RX ORDER — FAMOTIDINE 20 MG/1
20 TABLET, FILM COATED ORAL 2 TIMES DAILY PRN
Status: DISCONTINUED | OUTPATIENT
Start: 2023-04-03 | End: 2023-04-04 | Stop reason: HOSPADM

## 2023-04-03 RX ORDER — ACETAMINOPHEN 325 MG/1
650 TABLET ORAL EVERY 4 HOURS PRN
Status: DISCONTINUED | OUTPATIENT
Start: 2023-04-03 | End: 2023-04-04 | Stop reason: HOSPADM

## 2023-04-03 RX ORDER — PRENATAL WITH FERROUS FUM AND FOLIC ACID 3080; 920; 120; 400; 22; 1.84; 3; 20; 10; 1; 12; 200; 27; 25; 2 [IU]/1; [IU]/1; MG/1; [IU]/1; MG/1; MG/1; MG/1; MG/1; MG/1; MG/1; UG/1; MG/1; MG/1; MG/1; MG/1
1 TABLET ORAL DAILY
Status: DISCONTINUED | OUTPATIENT
Start: 2023-04-03 | End: 2023-04-04 | Stop reason: HOSPADM

## 2023-04-03 RX ADMIN — SODIUM CHLORIDE, POTASSIUM CHLORIDE, SODIUM LACTATE AND CALCIUM CHLORIDE: 600; 310; 30; 20 INJECTION, SOLUTION INTRAVENOUS at 13:13

## 2023-04-03 RX ADMIN — SODIUM CHLORIDE, POTASSIUM CHLORIDE, SODIUM LACTATE AND CALCIUM CHLORIDE: 600; 310; 30; 20 INJECTION, SOLUTION INTRAVENOUS at 21:17

## 2023-04-03 ASSESSMENT — ENCOUNTER SYMPTOMS
CONSTIPATION: 0
VOMITING: 0
NAUSEA: 0
DIARRHEA: 0
ABDOMINAL PAIN: 0
SHORTNESS OF BREATH: 0
ABDOMINAL DISTENTION: 0

## 2023-04-03 NOTE — H&P
Department of Obstetrics and Gynecology  Labor and Delivery  Attending History and Physical      SUBJECTIVE:  35 y/o  female at 37 weeks 1 day gestation with [facility] 23 presents from the office to L&D triage for evaluation secondary to severe range blood pressure elevations  (176/100, 180/94). Admits to off an on headaches. Admits to some right rib pain. Denies vision changes. Denies vaginal bleeding, loss of fluid, and decreased fetal movement. Admits to irregular contractions. Pregnancy is complicated by uterine synechiae--lower uterine segment, tobacco use (1/2 PPD), history of heroin use (no recent use), history of  (breech), history of , history of LEEP procedure, ASCUS pap smear with high risk HPV positive, history of postpartum pre-eclampsia, family history of breast cancer, and elevated 1 hour GTT with normal 3 hour GTT. Review of Systems   Constitutional: Negative. Negative for activity change, appetite change, chills, fatigue, fever and unexpected weight change. Eyes:  Negative for visual disturbance. Respiratory:  Negative for shortness of breath. Cardiovascular:  Negative for chest pain. Gastrointestinal:  Negative for abdominal distention, abdominal pain (right rib pain), constipation, diarrhea, nausea and vomiting. Genitourinary:  Negative for difficulty urinating, dysuria, hematuria, pelvic pain, vaginal bleeding, vaginal discharge and vaginal pain. Neurological:  Positive for headaches. Psychiatric/Behavioral: Negative. No Known Allergies  No current facility-administered medications on file prior to encounter.      Current Outpatient Medications on File Prior to Encounter   Medication Sig Dispense Refill    Blood Pressure KIT 1 Device by Does not apply route once for 1 dose 1 kit 0    Prenatal Vit-Fe Fumarate-FA (PRENATAL PLUS) 27-1 MG TABS Take 1 tablet by mouth daily 30 tablet 3     Past Medical History:   Diagnosis Date    Abnormal Pap smear of

## 2023-04-03 NOTE — PROGRESS NOTES
34 y.o. [id number] at 37w1d EGA Estimated Date of Delivery: 23 here for LISE:     Pt seen and examined. Denies VB, LOF, CTX -- but does admit to abdominal pain yesterday and fatigue. Endorses (+) FM. Denies fevers / chills / chest pain / shortness of breath. Denies HA, changes with vision, RUQ pain, edema. Elevated BP x 2, see below. To triage for Labs / BP / possible delivery. MWQ reviewed. Objective:  BP (!) 180/94   Wt 164 lb (74.4 kg)   LMP 2022   BMI 23.53 kg/m²    RPT also 170/100 w/ different cuff and LPN   Gen: AO, NAD  Abd: Soft, NT, gravid    VE: 3/50/-2, same as previous exam    GBS neg  Ext: Mild LE edema  OMM: Increased lumbar lordosis    Assessment/Plan:   Diagnosis Orders   1. Prenatal care, subsequent pregnancy in third trimester        2. Amniotic band in second trimester, single or unspecified fetus        3. History of  delivery, currently pregnant        4. Elevated blood pressure affecting pregnancy in third trimester, antepartum        5. Hx of  section        6. History of loop electrical excision procedure (LEEP)        7. History of heroin abuse ([facility] [address]        8. History of         9. Family history of breast cancer        10. Maternal tobacco use in third trimester        6. Hx of Preeclampsia in postpartum period        15. History of trichomonal vaginitis        13. ASCUS with positive high risk HPV cervical               Prenatal Care, Subsequent Pregnancy   Reassuring fetal / maternal status today  Aneuploidy / Carrier Screen: declined     AFP: declined   PNL: A pos/ab neg, R Immune, Varicella Immune, HepB neg, HepC negative, HIV neg, syphilis non-reactive, Hgb 11.2, Plt 180, UDS neg, UCx neg, GCCT neg/neg, Pap ASCUS HR other HPV pos 10/10/2022  Anatomy: Lateral placenta, no previa, CL 4.07cm, AIMEE wnl, EFW 46.7 %ile, no gross anatomic abnormalities, Sub-optimal Spine, LVOT, RVOT, AA, cross over, 4 ch heart, stomach.   Male fetus  28 wk: GCT (+)

## 2023-04-04 VITALS
TEMPERATURE: 97.7 F | DIASTOLIC BLOOD PRESSURE: 60 MMHG | SYSTOLIC BLOOD PRESSURE: 119 MMHG | HEART RATE: 84 BPM | OXYGEN SATURATION: 99 % | RESPIRATION RATE: 16 BRPM

## 2023-04-04 LAB
ALBUMIN SERPL-MCNC: 2.9 G/DL (ref 3.4–5)
ALBUMIN/GLOB SERPL: 1.1 {RATIO} (ref 1.1–2.2)
ALP SERPL-CCNC: 126 U/L (ref 40–129)
ALT SERPL-CCNC: <5 U/L (ref 10–40)
ANION GAP SERPL CALCULATED.3IONS-SCNC: 11 MMOL/L (ref 3–16)
APTT BLD: 27.7 SEC (ref 22.7–35.9)
AST SERPL-CCNC: 6 U/L (ref 15–37)
BASOPHILS # BLD: 0.1 K/UL (ref 0–0.2)
BASOPHILS NFR BLD: 1.3 %
BILIRUB SERPL-MCNC: 0.3 MG/DL (ref 0–1)
BUN SERPL-MCNC: 8 MG/DL (ref 7–20)
CALCIUM SERPL-MCNC: 8.8 MG/DL (ref 8.3–10.6)
CHLORIDE SERPL-SCNC: 106 MMOL/L (ref 99–110)
CO2 SERPL-SCNC: 21 MMOL/L (ref 21–32)
CREAT 24H UR-MRATE: 2 G/24HR (ref 0.6–1.5)
CREAT SERPL-MCNC: <0.5 MG/DL (ref 0.6–1.1)
DEPRECATED RDW RBC AUTO: 13.4 % (ref 12.4–15.4)
EOSINOPHIL # BLD: 0.2 K/UL (ref 0–0.6)
EOSINOPHIL NFR BLD: 1.9 %
FIBRINOGEN PPP-MCNC: 427 MG/DL (ref 243–550)
GFR SERPLBLD CREATININE-BSD FMLA CKD-EPI: >60 ML/MIN/{1.73_M2}
GLUCOSE SERPL-MCNC: 91 MG/DL (ref 70–99)
HCT VFR BLD AUTO: 26.9 % (ref 36–48)
HGB BLD-MCNC: 9.1 G/DL (ref 12–16)
INR PPP: 0.98 (ref 0.84–1.16)
LYMPHOCYTES # BLD: 3.5 K/UL (ref 1–5.1)
LYMPHOCYTES NFR BLD: 33.3 %
MCH RBC QN AUTO: 31 PG (ref 26–34)
MCHC RBC AUTO-ENTMCNC: 33.9 G/DL (ref 31–36)
MCV RBC AUTO: 91.2 FL (ref 80–100)
MONOCYTES # BLD: 0.5 K/UL (ref 0–1.3)
MONOCYTES NFR BLD: 4.9 %
NEUTROPHILS # BLD: 6.1 K/UL (ref 1.7–7.7)
NEUTROPHILS NFR BLD: 58.6 %
PLATELET # BLD AUTO: 271 K/UL (ref 135–450)
PMV BLD AUTO: 10.4 FL (ref 5–10.5)
POTASSIUM SERPL-SCNC: 4.1 MMOL/L (ref 3.5–5.1)
PROT 24H UR-MRATE: 0.23 G/24HR
PROT SERPL-MCNC: 5.5 G/DL (ref 6.4–8.2)
PROTHROMBIN TIME: 13 SEC (ref 11.5–14.8)
RBC # BLD AUTO: 2.95 M/UL (ref 4–5.2)
SODIUM SERPL-SCNC: 138 MMOL/L (ref 136–145)
SPECIMEN VOL 24H UR: 2500 ML
URATE SERPL-MCNC: 4.2 MG/DL (ref 2.6–6)
WBC # BLD AUTO: 10.5 K/UL (ref 4–11)

## 2023-04-04 PROCEDURE — 85025 COMPLETE CBC W/AUTO DIFF WBC: CPT

## 2023-04-04 PROCEDURE — G0378 HOSPITAL OBSERVATION PER HR: HCPCS

## 2023-04-04 PROCEDURE — 84156 ASSAY OF PROTEIN URINE: CPT

## 2023-04-04 PROCEDURE — 80053 COMPREHEN METABOLIC PANEL: CPT

## 2023-04-04 PROCEDURE — 85384 FIBRINOGEN ACTIVITY: CPT

## 2023-04-04 PROCEDURE — 84550 ASSAY OF BLOOD/URIC ACID: CPT

## 2023-04-04 PROCEDURE — 85730 THROMBOPLASTIN TIME PARTIAL: CPT

## 2023-04-04 PROCEDURE — 36415 COLL VENOUS BLD VENIPUNCTURE: CPT

## 2023-04-04 PROCEDURE — 85610 PROTHROMBIN TIME: CPT

## 2023-04-04 PROCEDURE — 99231 SBSQ HOSP IP/OBS SF/LOW 25: CPT | Performed by: OBSTETRICS & GYNECOLOGY

## 2023-04-04 PROCEDURE — 96361 HYDRATE IV INFUSION ADD-ON: CPT

## 2023-04-04 PROCEDURE — 2580000003 HC RX 258: Performed by: OBSTETRICS & GYNECOLOGY

## 2023-04-04 RX ADMIN — SODIUM CHLORIDE, POTASSIUM CHLORIDE, SODIUM LACTATE AND CALCIUM CHLORIDE: 600; 310; 30; 20 INJECTION, SOLUTION INTRAVENOUS at 05:33

## 2023-04-04 NOTE — PLAN OF CARE
Problem: Pain  Goal: Verbalizes/displays adequate comfort level or baseline comfort level  4/4/2023 1025 by Ayaka Bird RN  Outcome: Adequate for Discharge  4/3/2023 2040 by Tonio Boyce RN  Outcome: Progressing     Problem: Cardiovascular - Adult  Goal: Maintains optimal cardiac output and hemodynamic stability  4/4/2023 1025 by Ayaka Bird RN  Outcome: Adequate for Discharge  4/3/2023 2040 by Tonio Boyce RN  Outcome: Progressing

## 2023-04-04 NOTE — PLAN OF CARE
Problem: Pain  Goal: Verbalizes/displays adequate comfort level or baseline comfort level  4/3/2023 2040 by Giovanny Hansen RN  Outcome: Progressing  4/3/2023 1318 by Urban Massey RN  Outcome: Progressing     Problem: Cardiovascular - Adult  Goal: Maintains optimal cardiac output and hemodynamic stability  Outcome: Progressing

## 2023-04-04 NOTE — PROGRESS NOTES
Antepartum Progress Note    Subjective:  34 y.o. [id number] at 37w2d (Estimated Date of Delivery: 4/23/23): She was admitted for observation due to elevated BP in office x 2, she has been normal since admission, labs normal. Denies any HA / VC / RUQ pain / EDEMA. Denies VB / Cristian Meeter / DFM. Admits to occasional BHX contractions.      Review of Systems - The following ROS was otherwise negative, except as noted in the HPI: constitutional, respiratory, cardiovascular, gastrointestinal, genitourinary    Objective:  /62   Pulse 80   Temp 97.7 °F (36.5 °C) (Oral)   Resp 16   LMP 07/17/2022   SpO2 99%   General: Alert, well appearing, no acute distress  Lungs: Unlabored respirations   Abdomen: Gravid, soft, nontender to palpation   Extremities: No redness or tenderness  Osteopathic: No TART changes    Assessment/Plan  34 y.o. [id number] at 37w2d EGA, Elevated BP w/o Dx of HTN :  - await 24 hr urine, UPC was too low to calculate   - HELLP labs normal   - hx of Pre E at end of last pregnancy   - AM NST pending, otherwise reassuring fetal status     Disposition:  Cont Inpt obs until 24hr urine done     FU in office this week on Th / Friday     Birthjohn Louis DO

## 2023-04-04 NOTE — PROGRESS NOTES
D/c instructions given. Pt verbalized understanding and denied questions. Pt left OB unit @ 1423 in stable condition, ambulatory and undelivered. Not in active labor. 98.7

## 2023-04-04 NOTE — DISCHARGE SUMMARY
Department of Obstetrics and Gynecology  Postpartum Discharge Summary      Admit Date: 4/3/2023    Admit Diagnosis: Elevated blood pressure affecting pregnancy in third trimester, antepartum [O16.3]    Discharge Date: 04/04/23    Condition at Discharge: good    Discharge Diagnoses:  stable     Discharge Disposition:  Home    Service: Obstetrics    Hospital Course:  see epic     FU in 2 -3 days     Current Discharge Medication List        CONTINUE these medications which have NOT CHANGED    Details   Blood Pressure KIT 1 Device by Does not apply route once for 1 dose  Qty: 1 kit, Refills: 0      Prenatal Vit-Fe Fumarate-FA (PRENATAL PLUS) 27-1 MG TABS Take 1 tablet by mouth daily  Qty: 30 tablet, Refills: 3             Electronically signed by Kristin Jimenes DO on 4/4/2023 at 2:17 PM

## 2023-04-06 ENCOUNTER — ROUTINE PRENATAL (OUTPATIENT)
Dept: OBGYN CLINIC | Age: 33
End: 2023-04-06

## 2023-04-06 VITALS
DIASTOLIC BLOOD PRESSURE: 78 MMHG | BODY MASS INDEX: 23.42 KG/M2 | SYSTOLIC BLOOD PRESSURE: 130 MMHG | WEIGHT: 163.2 LBS | HEART RATE: 104 BPM

## 2023-04-06 DIAGNOSIS — Z98.890 HISTORY OF LOOP ELECTRICAL EXCISION PROCEDURE (LEEP): ICD-10-CM

## 2023-04-06 DIAGNOSIS — F11.11 HISTORY OF HEROIN ABUSE (HCC): ICD-10-CM

## 2023-04-06 DIAGNOSIS — Z34.83 PRENATAL CARE, SUBSEQUENT PREGNANCY IN THIRD TRIMESTER: Primary | ICD-10-CM

## 2023-04-06 DIAGNOSIS — Z80.3 FAMILY HISTORY OF BREAST CANCER: ICD-10-CM

## 2023-04-06 DIAGNOSIS — Z98.891 HISTORY OF VBAC: ICD-10-CM

## 2023-04-06 DIAGNOSIS — O41.8X20 AMNIOTIC BAND IN SECOND TRIMESTER, SINGLE OR UNSPECIFIED FETUS: ICD-10-CM

## 2023-04-06 DIAGNOSIS — O99.333 MATERNAL TOBACCO USE IN THIRD TRIMESTER: ICD-10-CM

## 2023-04-06 DIAGNOSIS — O09.899 HISTORY OF PRETERM DELIVERY, CURRENTLY PREGNANT: ICD-10-CM

## 2023-04-06 DIAGNOSIS — Z98.891 HX OF CESAREAN SECTION: ICD-10-CM

## 2023-04-06 DIAGNOSIS — O16.3 ELEVATED BLOOD PRESSURE AFFECTING PREGNANCY IN THIRD TRIMESTER, ANTEPARTUM: ICD-10-CM

## 2023-04-06 DIAGNOSIS — R87.810 ASCUS WITH POSITIVE HIGH RISK HPV CERVICAL: ICD-10-CM

## 2023-04-06 DIAGNOSIS — R87.610 ASCUS WITH POSITIVE HIGH RISK HPV CERVICAL: ICD-10-CM

## 2023-04-06 DIAGNOSIS — Z86.19 HISTORY OF TRICHOMONAL VAGINITIS: ICD-10-CM

## 2023-04-08 NOTE — PROGRESS NOTES
34 y.o. [id number] at 37w4d EGA Estimated Date of Delivery: 23 here for LISE:     Pt seen and examined. Doing better since being seen in triage, BP today normal. 130/78. Denies other symptoms. Denies VB, LOF, CTX . Endorses (+) FM. Denies fevers / chills / chest pain / shortness of breath. Denies HA, changes with vision, RUQ pain, edema. MWQ reviewed. Objective:  /78   Pulse (!) 104   Wt 163 lb 3.2 oz (74 kg)   LMP 2022   BMI 23.42 kg/m²   Gen: AO, NAD  Abd: Soft, NT, gravid   Ext: Mild LE edema  OMM: Increased lumbar lordosis    Assessment/Plan:   Diagnosis Orders   1. Prenatal care, subsequent pregnancy in third trimester        2. Amniotic band in second trimester, single or unspecified fetus        3. History of  delivery, currently pregnant        4. Elevated blood pressure affecting pregnancy in third trimester, antepartum        5. Hx of  section        6. History of loop electrical excision procedure (LEEP)        7. History of heroin abuse ([facility] [address]        8. History of         9. Family history of breast cancer        10. Maternal tobacco use in third trimester        6. Hx of Preeclampsia in postpartum period        15. History of trichomonal vaginitis        13. ASCUS with positive high risk HPV cervical          Prenatal Care, Subsequent Pregnancy   Reassuring fetal / maternal status today  Aneuploidy / Carrier Screen: declined     AFP: declined   PNL: A pos/ab neg, R Immune, Varicella Immune, HepB neg, HepC negative, HIV neg, syphilis non-reactive, Hgb 11.2, Plt 180, UDS neg, UCx neg, GCCT neg/neg, Pap ASCUS HR other HPV pos 10/10/2022  Anatomy: Lateral placenta, no previa, CL 4.07cm, AIMEE wnl, EFW 46.7 %ile, no gross anatomic abnormalities, Sub-optimal Spine, LVOT, RVOT, AA, cross over, 4 ch heart, stomach.   Male fetus  28 wk: GCT (+)  Hgb Plt nml   3hr GTT normal   Tdap: given at 28 wks    GBS: Pending   Birth Plan: RCS + BS -- see below    Breastfeeding

## 2023-04-16 ENCOUNTER — ANESTHESIA (OUTPATIENT)
Dept: LABOR AND DELIVERY | Age: 33
DRG: 539 | End: 2023-04-16
Payer: COMMERCIAL

## 2023-04-16 ENCOUNTER — HOSPITAL ENCOUNTER (INPATIENT)
Age: 33
LOS: 1 days | Discharge: HOME | DRG: 539 | End: 2023-04-18
Attending: OBSTETRICS & GYNECOLOGY | Admitting: OBSTETRICS & GYNECOLOGY
Payer: COMMERCIAL

## 2023-04-16 ENCOUNTER — ANESTHESIA EVENT (OUTPATIENT)
Dept: LABOR AND DELIVERY | Age: 33
DRG: 539 | End: 2023-04-16
Payer: COMMERCIAL

## 2023-04-16 DIAGNOSIS — Z98.891 S/P CESAREAN SECTION: Primary | ICD-10-CM

## 2023-04-16 PROBLEM — O34.219 DECLINES VAGINAL BIRTH AFTER CESAREAN TRIAL: Status: ACTIVE | Noted: 2023-04-16

## 2023-04-16 LAB
ABO + RH BLD: NORMAL
ALBUMIN SERPL-MCNC: 3.5 G/DL (ref 3.4–5)
ALBUMIN/GLOB SERPL: 1.1 {RATIO} (ref 1.1–2.2)
ALP SERPL-CCNC: 191 U/L (ref 40–129)
ALT SERPL-CCNC: <5 U/L (ref 10–40)
AMPHETAMINES UR QL SCN>1000 NG/ML: NORMAL
ANION GAP SERPL CALCULATED.3IONS-SCNC: 12 MMOL/L (ref 3–16)
AST SERPL-CCNC: 9 U/L (ref 15–37)
BARBITURATES UR QL SCN>200 NG/ML: NORMAL
BASOPHILS # BLD: 0.1 K/UL (ref 0–0.2)
BASOPHILS NFR BLD: 0.6 %
BENZODIAZ UR QL SCN>200 NG/ML: NORMAL
BILIRUB SERPL-MCNC: 0.4 MG/DL (ref 0–1)
BLD GP AB SCN SERPL QL: NORMAL
BUN SERPL-MCNC: 9 MG/DL (ref 7–20)
BUPRENORPHINE+NOR UR QL SCN: NORMAL
CALCIUM SERPL-MCNC: 9.3 MG/DL (ref 8.3–10.6)
CANNABINOIDS UR QL SCN>50 NG/ML: NORMAL
CHLORIDE SERPL-SCNC: 104 MMOL/L (ref 99–110)
CO2 SERPL-SCNC: 20 MMOL/L (ref 21–32)
COCAINE UR QL SCN: NORMAL
CREAT SERPL-MCNC: <0.5 MG/DL (ref 0.6–1.1)
CREAT UR-MCNC: 87.1 MG/DL (ref 28–259)
DEPRECATED RDW RBC AUTO: 13.6 % (ref 12.4–15.4)
DRUG SCREEN COMMENT UR-IMP: NORMAL
EOSINOPHIL # BLD: 0.1 K/UL (ref 0–0.6)
EOSINOPHIL NFR BLD: 1.3 %
FENTANYL SCREEN, URINE: NORMAL
FIBRINOGEN PPP-MCNC: 506 MG/DL (ref 243–550)
GFR SERPLBLD CREATININE-BSD FMLA CKD-EPI: >60 ML/MIN/{1.73_M2}
GLUCOSE SERPL-MCNC: 82 MG/DL (ref 70–99)
HCT VFR BLD AUTO: 30.9 % (ref 36–48)
HGB BLD-MCNC: 10.5 G/DL (ref 12–16)
LYMPHOCYTES # BLD: 2.6 K/UL (ref 1–5.1)
LYMPHOCYTES NFR BLD: 23 %
MCH RBC QN AUTO: 30.5 PG (ref 26–34)
MCHC RBC AUTO-ENTMCNC: 34 G/DL (ref 31–36)
MCV RBC AUTO: 89.8 FL (ref 80–100)
METHADONE UR QL SCN>300 NG/ML: NORMAL
MONOCYTES # BLD: 0.5 K/UL (ref 0–1.3)
MONOCYTES NFR BLD: 4.3 %
NEUTROPHILS # BLD: 7.9 K/UL (ref 1.7–7.7)
NEUTROPHILS NFR BLD: 70.8 %
OPIATES UR QL SCN>300 NG/ML: NORMAL
OXYCODONE UR QL SCN: NORMAL
PCP UR QL SCN>25 NG/ML: NORMAL
PH UR STRIP: 6 [PH]
PLATELET # BLD AUTO: 295 K/UL (ref 135–450)
PMV BLD AUTO: 10.6 FL (ref 5–10.5)
POTASSIUM SERPL-SCNC: 3.8 MMOL/L (ref 3.5–5.1)
PROT SERPL-MCNC: 6.8 G/DL (ref 6.4–8.2)
PROT UR-MCNC: 29 MG/DL
PROT/CREAT UR-RTO: 0.3 MG/DL
RBC # BLD AUTO: 3.45 M/UL (ref 4–5.2)
SODIUM SERPL-SCNC: 136 MMOL/L (ref 136–145)
WBC # BLD AUTO: 11.2 K/UL (ref 4–11)

## 2023-04-16 PROCEDURE — 6360000002 HC RX W HCPCS

## 2023-04-16 PROCEDURE — 86780 TREPONEMA PALLIDUM: CPT

## 2023-04-16 PROCEDURE — 2500000003 HC RX 250 WO HCPCS: Performed by: NURSE ANESTHETIST, CERTIFIED REGISTERED

## 2023-04-16 PROCEDURE — 86900 BLOOD TYPING SEROLOGIC ABO: CPT

## 2023-04-16 PROCEDURE — 6370000000 HC RX 637 (ALT 250 FOR IP)

## 2023-04-16 PROCEDURE — 80053 COMPREHEN METABOLIC PANEL: CPT

## 2023-04-16 PROCEDURE — 2500000003 HC RX 250 WO HCPCS: Performed by: ANESTHESIOLOGY

## 2023-04-16 PROCEDURE — 6370000000 HC RX 637 (ALT 250 FOR IP): Performed by: NURSE ANESTHETIST, CERTIFIED REGISTERED

## 2023-04-16 PROCEDURE — 82570 ASSAY OF URINE CREATININE: CPT

## 2023-04-16 PROCEDURE — 3700000000 HC ANESTHESIA ATTENDED CARE: Performed by: OBSTETRICS & GYNECOLOGY

## 2023-04-16 PROCEDURE — 3609079900 HC CESAREAN SECTION: Performed by: OBSTETRICS & GYNECOLOGY

## 2023-04-16 PROCEDURE — 99221 1ST HOSP IP/OBS SF/LOW 40: CPT | Performed by: OBSTETRICS & GYNECOLOGY

## 2023-04-16 PROCEDURE — 84156 ASSAY OF PROTEIN URINE: CPT

## 2023-04-16 PROCEDURE — 6360000002 HC RX W HCPCS: Performed by: OBSTETRICS & GYNECOLOGY

## 2023-04-16 PROCEDURE — 85025 COMPLETE CBC W/AUTO DIFF WBC: CPT

## 2023-04-16 PROCEDURE — 86901 BLOOD TYPING SEROLOGIC RH(D): CPT

## 2023-04-16 PROCEDURE — 0UT70ZZ RESECTION OF BILATERAL FALLOPIAN TUBES, OPEN APPROACH: ICD-10-PCS | Performed by: OBSTETRICS & GYNECOLOGY

## 2023-04-16 PROCEDURE — 7100000000 HC PACU RECOVERY - FIRST 15 MIN: Performed by: OBSTETRICS & GYNECOLOGY

## 2023-04-16 PROCEDURE — 6360000002 HC RX W HCPCS: Performed by: NURSE ANESTHETIST, CERTIFIED REGISTERED

## 2023-04-16 PROCEDURE — 3700000001 HC ADD 15 MINUTES (ANESTHESIA): Performed by: OBSTETRICS & GYNECOLOGY

## 2023-04-16 PROCEDURE — 7100000001 HC PACU RECOVERY - ADDTL 15 MIN: Performed by: OBSTETRICS & GYNECOLOGY

## 2023-04-16 PROCEDURE — 88302 TISSUE EXAM BY PATHOLOGIST: CPT

## 2023-04-16 PROCEDURE — 2580000003 HC RX 258: Performed by: OBSTETRICS & GYNECOLOGY

## 2023-04-16 PROCEDURE — 2709999900 HC NON-CHARGEABLE SUPPLY: Performed by: OBSTETRICS & GYNECOLOGY

## 2023-04-16 PROCEDURE — 86850 RBC ANTIBODY SCREEN: CPT

## 2023-04-16 PROCEDURE — 80307 DRUG TEST PRSMV CHEM ANLYZR: CPT

## 2023-04-16 PROCEDURE — 85384 FIBRINOGEN ACTIVITY: CPT

## 2023-04-16 RX ORDER — TRISODIUM CITRATE DIHYDRATE AND CITRIC ACID MONOHYDRATE 500; 334 MG/5ML; MG/5ML
SOLUTION ORAL
Status: COMPLETED
Start: 2023-04-16 | End: 2023-04-16

## 2023-04-16 RX ORDER — TRISODIUM CITRATE DIHYDRATE AND CITRIC ACID MONOHYDRATE 500; 334 MG/5ML; MG/5ML
30 SOLUTION ORAL ONCE
Status: COMPLETED | OUTPATIENT
Start: 2023-04-16 | End: 2023-04-16

## 2023-04-16 RX ORDER — ONDANSETRON 2 MG/ML
INJECTION INTRAMUSCULAR; INTRAVENOUS
Status: COMPLETED
Start: 2023-04-16 | End: 2023-04-16

## 2023-04-16 RX ORDER — MORPHINE SULFATE 10 MG/ML
INJECTION, SOLUTION INTRAMUSCULAR; INTRAVENOUS PRN
Status: DISCONTINUED | OUTPATIENT
Start: 2023-04-16 | End: 2023-04-16 | Stop reason: SDUPTHER

## 2023-04-16 RX ORDER — METOCLOPRAMIDE HYDROCHLORIDE 5 MG/ML
INJECTION INTRAMUSCULAR; INTRAVENOUS PRN
Status: DISCONTINUED | OUTPATIENT
Start: 2023-04-16 | End: 2023-04-16 | Stop reason: SDUPTHER

## 2023-04-16 RX ORDER — KETOROLAC TROMETHAMINE 30 MG/ML
30 INJECTION, SOLUTION INTRAMUSCULAR; INTRAVENOUS ONCE
Status: DISCONTINUED | OUTPATIENT
Start: 2023-04-17 | End: 2023-04-17

## 2023-04-16 RX ORDER — FENTANYL CITRATE 50 UG/ML
INJECTION, SOLUTION INTRAMUSCULAR; INTRAVENOUS PRN
Status: DISCONTINUED | OUTPATIENT
Start: 2023-04-16 | End: 2023-04-16

## 2023-04-16 RX ORDER — FAMOTIDINE 10 MG/ML
INJECTION, SOLUTION INTRAVENOUS
Status: COMPLETED
Start: 2023-04-16 | End: 2023-04-16

## 2023-04-16 RX ORDER — CEFAZOLIN SODIUM IN 0.9 % NACL 2 G/100 ML
2000 PLASTIC BAG, INJECTION (ML) INTRAVENOUS ONCE
Status: COMPLETED | OUTPATIENT
Start: 2023-04-16 | End: 2023-04-16

## 2023-04-16 RX ORDER — FENTANYL CITRATE 50 UG/ML
INJECTION, SOLUTION INTRAMUSCULAR; INTRAVENOUS
Status: COMPLETED
Start: 2023-04-16 | End: 2023-04-16

## 2023-04-16 RX ORDER — OXYTOCIN 10 [USP'U]/ML
INJECTION, SOLUTION INTRAMUSCULAR; INTRAVENOUS
Status: COMPLETED
Start: 2023-04-16 | End: 2023-04-16

## 2023-04-16 RX ORDER — BUPIVACAINE HYDROCHLORIDE 7.5 MG/ML
INJECTION, SOLUTION INTRASPINAL
Status: COMPLETED | OUTPATIENT
Start: 2023-04-16 | End: 2023-04-16

## 2023-04-16 RX ORDER — SODIUM CHLORIDE, SODIUM LACTATE, POTASSIUM CHLORIDE, CALCIUM CHLORIDE 600; 310; 30; 20 MG/100ML; MG/100ML; MG/100ML; MG/100ML
INJECTION, SOLUTION INTRAVENOUS CONTINUOUS
Status: DISCONTINUED | OUTPATIENT
Start: 2023-04-16 | End: 2023-04-17

## 2023-04-16 RX ORDER — OXYTOCIN 10 [USP'U]/ML
INJECTION, SOLUTION INTRAMUSCULAR; INTRAVENOUS PRN
Status: DISCONTINUED | OUTPATIENT
Start: 2023-04-16 | End: 2023-04-16 | Stop reason: SDUPTHER

## 2023-04-16 RX ORDER — AZITHROMYCIN 500 MG/1
INJECTION, POWDER, LYOPHILIZED, FOR SOLUTION INTRAVENOUS
Status: DISCONTINUED
Start: 2023-04-16 | End: 2023-04-17

## 2023-04-16 RX ORDER — FENTANYL CITRATE 50 UG/ML
INJECTION, SOLUTION INTRAMUSCULAR; INTRAVENOUS PRN
Status: DISCONTINUED | OUTPATIENT
Start: 2023-04-16 | End: 2023-04-16 | Stop reason: SDUPTHER

## 2023-04-16 RX ORDER — MORPHINE SULFATE 1 MG/ML
INJECTION, SOLUTION EPIDURAL; INTRATHECAL; INTRAVENOUS
Status: DISCONTINUED
Start: 2023-04-16 | End: 2023-04-17

## 2023-04-16 RX ORDER — FAMOTIDINE 10 MG/ML
INJECTION, SOLUTION INTRAVENOUS PRN
Status: DISCONTINUED | OUTPATIENT
Start: 2023-04-16 | End: 2023-04-16 | Stop reason: SDUPTHER

## 2023-04-16 RX ORDER — ONDANSETRON 2 MG/ML
4 INJECTION INTRAMUSCULAR; INTRAVENOUS EVERY 6 HOURS PRN
Status: DISCONTINUED | OUTPATIENT
Start: 2023-04-16 | End: 2023-04-17

## 2023-04-16 RX ORDER — ONDANSETRON 2 MG/ML
INJECTION INTRAMUSCULAR; INTRAVENOUS PRN
Status: DISCONTINUED | OUTPATIENT
Start: 2023-04-16 | End: 2023-04-16 | Stop reason: SDUPTHER

## 2023-04-16 RX ORDER — KETOROLAC TROMETHAMINE 30 MG/ML
30 INJECTION, SOLUTION INTRAMUSCULAR; INTRAVENOUS ONCE
Status: COMPLETED | OUTPATIENT
Start: 2023-04-16 | End: 2023-04-16

## 2023-04-16 RX ORDER — KETOROLAC TROMETHAMINE 30 MG/ML
INJECTION, SOLUTION INTRAMUSCULAR; INTRAVENOUS
Status: COMPLETED
Start: 2023-04-16 | End: 2023-04-16

## 2023-04-16 RX ORDER — MORPHINE SULFATE 10 MG/ML
INJECTION, SOLUTION INTRAMUSCULAR; INTRAVENOUS PRN
Status: DISCONTINUED | OUTPATIENT
Start: 2023-04-16 | End: 2023-04-16

## 2023-04-16 RX ADMIN — MORPHINE SULFATE 0.8 MG: 10 INJECTION, SOLUTION INTRAMUSCULAR; INTRAVENOUS at 20:59

## 2023-04-16 RX ADMIN — SODIUM CHLORIDE, POTASSIUM CHLORIDE, SODIUM LACTATE AND CALCIUM CHLORIDE: 600; 310; 30; 20 INJECTION, SOLUTION INTRAVENOUS at 18:15

## 2023-04-16 RX ADMIN — OXYTOCIN 20 UNITS: 10 INJECTION INTRAVENOUS at 20:06

## 2023-04-16 RX ADMIN — Medication 2000 MG: at 19:28

## 2023-04-16 RX ADMIN — MORPHINE SULFATE 0.2 MG: 10 INJECTION, SOLUTION INTRAMUSCULAR; INTRAVENOUS at 19:24

## 2023-04-16 RX ADMIN — AZITHROMYCIN 500 MG: 100 POWDER, FOR SUSPENSION ORAL at 19:28

## 2023-04-16 RX ADMIN — FENTANYL CITRATE 15 MCG: 50 INJECTION, SOLUTION INTRAMUSCULAR; INTRAVENOUS at 19:24

## 2023-04-16 RX ADMIN — OXYTOCIN 10 UNITS: 10 INJECTION INTRAVENOUS at 19:51

## 2023-04-16 RX ADMIN — TRISODIUM CITRATE DIHYDRATE AND CITRIC ACID MONOHYDRATE 30 ML: 500; 334 SOLUTION ORAL at 19:03

## 2023-04-16 RX ADMIN — OXYTOCIN 20 UNITS: 10 INJECTION INTRAVENOUS at 21:05

## 2023-04-16 RX ADMIN — KETOROLAC TROMETHAMINE 30 MG: 30 INJECTION, SOLUTION INTRAMUSCULAR at 23:21

## 2023-04-16 RX ADMIN — METOCLOPRAMIDE HYDROCHLORIDE 10 MG: 5 INJECTION INTRAMUSCULAR; INTRAVENOUS at 18:55

## 2023-04-16 RX ADMIN — SODIUM CHLORIDE, POTASSIUM CHLORIDE, SODIUM LACTATE AND CALCIUM CHLORIDE: 600; 310; 30; 20 INJECTION, SOLUTION INTRAVENOUS at 18:45

## 2023-04-16 RX ADMIN — FAMOTIDINE 20 MG: 10 INJECTION, SOLUTION INTRAVENOUS at 18:55

## 2023-04-16 RX ADMIN — BUPIVACAINE HYDROCHLORIDE 15 MG: 7.5 INJECTION, SOLUTION SUBARACHNOID at 19:24

## 2023-04-16 RX ADMIN — KETOROLAC TROMETHAMINE 30 MG: 30 INJECTION, SOLUTION INTRAMUSCULAR; INTRAVENOUS at 23:21

## 2023-04-16 RX ADMIN — FENTANYL CITRATE 85 MCG: 50 INJECTION, SOLUTION INTRAMUSCULAR; INTRAVENOUS at 20:37

## 2023-04-16 RX ADMIN — SODIUM CHLORIDE, POTASSIUM CHLORIDE, SODIUM LACTATE AND CALCIUM CHLORIDE: 600; 310; 30; 20 INJECTION, SOLUTION INTRAVENOUS at 21:05

## 2023-04-16 RX ADMIN — SODIUM CHLORIDE, POTASSIUM CHLORIDE, SODIUM LACTATE AND CALCIUM CHLORIDE: 600; 310; 30; 20 INJECTION, SOLUTION INTRAVENOUS at 19:14

## 2023-04-16 RX ADMIN — SODIUM CITRATE AND CITRIC ACID MONOHYDRATE 30 ML: 500; 334 SOLUTION ORAL at 19:03

## 2023-04-16 RX ADMIN — MORPHINE SULFATE 3 MG: 10 INJECTION, SOLUTION INTRAMUSCULAR; INTRAVENOUS at 20:56

## 2023-04-16 RX ADMIN — SODIUM CHLORIDE, POTASSIUM CHLORIDE, SODIUM LACTATE AND CALCIUM CHLORIDE: 600; 310; 30; 20 INJECTION, SOLUTION INTRAVENOUS at 20:06

## 2023-04-16 RX ADMIN — SODIUM CHLORIDE, POTASSIUM CHLORIDE, SODIUM LACTATE AND CALCIUM CHLORIDE: 600; 310; 30; 20 INJECTION, SOLUTION INTRAVENOUS at 21:46

## 2023-04-16 RX ADMIN — ONDANSETRON 4 MG: 2 INJECTION INTRAMUSCULAR; INTRAVENOUS at 19:14

## 2023-04-16 ASSESSMENT — PAIN DESCRIPTION - LOCATION: LOCATION: ABDOMEN;INCISION

## 2023-04-16 ASSESSMENT — PAIN SCALES - GENERAL: PAINLEVEL_OUTOF10: 4

## 2023-04-16 ASSESSMENT — LIFESTYLE VARIABLES: SMOKING_STATUS: 1

## 2023-04-17 PROBLEM — Z98.891 S/P CESAREAN SECTION: Status: ACTIVE | Noted: 2023-04-17

## 2023-04-17 PROBLEM — Z90.79 STATUS POST BILATERAL SALPINGECTOMY: Status: ACTIVE | Noted: 2023-04-17

## 2023-04-17 LAB
BASOPHILS # BLD: 0.1 K/UL (ref 0–0.2)
BASOPHILS NFR BLD: 1.1 %
DEPRECATED RDW RBC AUTO: 13.3 % (ref 12.4–15.4)
EOSINOPHIL # BLD: 0.1 K/UL (ref 0–0.6)
EOSINOPHIL NFR BLD: 0.7 %
HCT VFR BLD AUTO: 28.5 % (ref 36–48)
HGB BLD-MCNC: 9.7 G/DL (ref 12–16)
LYMPHOCYTES # BLD: 2 K/UL (ref 1–5.1)
LYMPHOCYTES NFR BLD: 15.9 %
MCH RBC QN AUTO: 30.9 PG (ref 26–34)
MCHC RBC AUTO-ENTMCNC: 34 G/DL (ref 31–36)
MCV RBC AUTO: 90.8 FL (ref 80–100)
MONOCYTES # BLD: 0.5 K/UL (ref 0–1.3)
MONOCYTES NFR BLD: 4.3 %
NEUTROPHILS # BLD: 9.9 K/UL (ref 1.7–7.7)
NEUTROPHILS NFR BLD: 78 %
PLATELET # BLD AUTO: 261 K/UL (ref 135–450)
PMV BLD AUTO: 11 FL (ref 5–10.5)
RBC # BLD AUTO: 3.14 M/UL (ref 4–5.2)
REAGIN+T PALLIDUM IGG+IGM SERPL-IMP: NORMAL
WBC # BLD AUTO: 12.7 K/UL (ref 4–11)

## 2023-04-17 PROCEDURE — 2580000003 HC RX 258: Performed by: OBSTETRICS & GYNECOLOGY

## 2023-04-17 PROCEDURE — 99024 POSTOP FOLLOW-UP VISIT: CPT | Performed by: OBSTETRICS & GYNECOLOGY

## 2023-04-17 PROCEDURE — 6370000000 HC RX 637 (ALT 250 FOR IP): Performed by: OBSTETRICS & GYNECOLOGY

## 2023-04-17 PROCEDURE — 85025 COMPLETE CBC W/AUTO DIFF WBC: CPT

## 2023-04-17 PROCEDURE — 1220000000 HC SEMI PRIVATE OB R&B

## 2023-04-17 PROCEDURE — 36415 COLL VENOUS BLD VENIPUNCTURE: CPT

## 2023-04-17 RX ORDER — SODIUM CHLORIDE, SODIUM LACTATE, POTASSIUM CHLORIDE, AND CALCIUM CHLORIDE .6; .31; .03; .02 G/100ML; G/100ML; G/100ML; G/100ML
1000 INJECTION, SOLUTION INTRAVENOUS ONCE
Status: DISCONTINUED | OUTPATIENT
Start: 2023-04-17 | End: 2023-04-17

## 2023-04-17 RX ORDER — OXYCODONE HYDROCHLORIDE 5 MG/1
10 TABLET ORAL EVERY 4 HOURS PRN
Status: DISCONTINUED | OUTPATIENT
Start: 2023-04-17 | End: 2023-04-18 | Stop reason: HOSPADM

## 2023-04-17 RX ORDER — SODIUM CHLORIDE 0.9 % (FLUSH) 0.9 %
5-40 SYRINGE (ML) INJECTION EVERY 12 HOURS SCHEDULED
Status: DISCONTINUED | OUTPATIENT
Start: 2023-04-17 | End: 2023-04-18 | Stop reason: HOSPADM

## 2023-04-17 RX ORDER — FERROUS SULFATE 325(65) MG
325 TABLET ORAL
Status: DISCONTINUED | OUTPATIENT
Start: 2023-04-17 | End: 2023-04-18 | Stop reason: HOSPADM

## 2023-04-17 RX ORDER — SODIUM CHLORIDE 0.9 % (FLUSH) 0.9 %
10 SYRINGE (ML) INJECTION EVERY 12 HOURS SCHEDULED
Status: DISCONTINUED | OUTPATIENT
Start: 2023-04-17 | End: 2023-04-17

## 2023-04-17 RX ORDER — LANOLIN 100 %
OINTMENT (GRAM) TOPICAL
Status: DISCONTINUED | OUTPATIENT
Start: 2023-04-17 | End: 2023-04-18 | Stop reason: HOSPADM

## 2023-04-17 RX ORDER — DIPHENHYDRAMINE HYDROCHLORIDE 50 MG/ML
25 INJECTION INTRAMUSCULAR; INTRAVENOUS EVERY 6 HOURS PRN
Status: DISCONTINUED | OUTPATIENT
Start: 2023-04-17 | End: 2023-04-18 | Stop reason: HOSPADM

## 2023-04-17 RX ORDER — SODIUM CHLORIDE 9 MG/ML
INJECTION, SOLUTION INTRAVENOUS PRN
Status: DISCONTINUED | OUTPATIENT
Start: 2023-04-17 | End: 2023-04-17

## 2023-04-17 RX ORDER — SODIUM CHLORIDE, SODIUM LACTATE, POTASSIUM CHLORIDE, CALCIUM CHLORIDE 600; 310; 30; 20 MG/100ML; MG/100ML; MG/100ML; MG/100ML
INJECTION, SOLUTION INTRAVENOUS CONTINUOUS
Status: DISCONTINUED | OUTPATIENT
Start: 2023-04-17 | End: 2023-04-17

## 2023-04-17 RX ORDER — SODIUM CHLORIDE 9 MG/ML
INJECTION, SOLUTION INTRAVENOUS PRN
Status: DISCONTINUED | OUTPATIENT
Start: 2023-04-17 | End: 2023-04-18 | Stop reason: HOSPADM

## 2023-04-17 RX ORDER — ONDANSETRON 2 MG/ML
4 INJECTION INTRAMUSCULAR; INTRAVENOUS EVERY 6 HOURS PRN
Status: DISCONTINUED | OUTPATIENT
Start: 2023-04-17 | End: 2023-04-18 | Stop reason: HOSPADM

## 2023-04-17 RX ORDER — OXYCODONE HYDROCHLORIDE 5 MG/1
5 TABLET ORAL EVERY 4 HOURS PRN
Status: DISCONTINUED | OUTPATIENT
Start: 2023-04-17 | End: 2023-04-18 | Stop reason: HOSPADM

## 2023-04-17 RX ORDER — IBUPROFEN 800 MG/1
800 TABLET ORAL EVERY 8 HOURS SCHEDULED
Status: DISCONTINUED | OUTPATIENT
Start: 2023-04-17 | End: 2023-04-18 | Stop reason: HOSPADM

## 2023-04-17 RX ORDER — SODIUM CHLORIDE, SODIUM LACTATE, POTASSIUM CHLORIDE, CALCIUM CHLORIDE 600; 310; 30; 20 MG/100ML; MG/100ML; MG/100ML; MG/100ML
INJECTION, SOLUTION INTRAVENOUS CONTINUOUS
Status: DISCONTINUED | OUTPATIENT
Start: 2023-04-17 | End: 2023-04-18 | Stop reason: HOSPADM

## 2023-04-17 RX ORDER — ACETAMINOPHEN 500 MG
1000 TABLET ORAL EVERY 8 HOURS SCHEDULED
Status: DISCONTINUED | OUTPATIENT
Start: 2023-04-17 | End: 2023-04-18 | Stop reason: HOSPADM

## 2023-04-17 RX ORDER — SODIUM CHLORIDE 0.9 % (FLUSH) 0.9 %
5-40 SYRINGE (ML) INJECTION PRN
Status: DISCONTINUED | OUTPATIENT
Start: 2023-04-17 | End: 2023-04-18 | Stop reason: HOSPADM

## 2023-04-17 RX ORDER — SODIUM CHLORIDE 0.9 % (FLUSH) 0.9 %
10 SYRINGE (ML) INJECTION PRN
Status: DISCONTINUED | OUTPATIENT
Start: 2023-04-17 | End: 2023-04-17

## 2023-04-17 RX ORDER — DOCUSATE SODIUM 100 MG/1
100 CAPSULE, LIQUID FILLED ORAL 2 TIMES DAILY
Status: DISCONTINUED | OUTPATIENT
Start: 2023-04-17 | End: 2023-04-18 | Stop reason: HOSPADM

## 2023-04-17 RX ADMIN — DOCUSATE SODIUM 100 MG: 100 CAPSULE, LIQUID FILLED ORAL at 19:55

## 2023-04-17 RX ADMIN — MOXIFLOXACIN HYDROCHLORIDE 800 MG: 400 TABLET, FILM COATED ORAL at 09:04

## 2023-04-17 RX ADMIN — Medication 10 ML: at 13:39

## 2023-04-17 RX ADMIN — ACETAMINOPHEN 1000 MG: 500 TABLET ORAL at 11:37

## 2023-04-17 RX ADMIN — Medication 10 ML: at 19:56

## 2023-04-17 RX ADMIN — ACETAMINOPHEN 1000 MG: 500 TABLET ORAL at 19:55

## 2023-04-17 RX ADMIN — DOCUSATE SODIUM 100 MG: 100 CAPSULE, LIQUID FILLED ORAL at 09:04

## 2023-04-17 RX ADMIN — MOXIFLOXACIN HYDROCHLORIDE 800 MG: 400 TABLET, FILM COATED ORAL at 17:31

## 2023-04-17 ASSESSMENT — PAIN SCALES - GENERAL
PAINLEVEL_OUTOF10: 3
PAINLEVEL_OUTOF10: 2
PAINLEVEL_OUTOF10: 4

## 2023-04-17 ASSESSMENT — PAIN DESCRIPTION - DESCRIPTORS
DESCRIPTORS: CRAMPING
DESCRIPTORS: ACHING

## 2023-04-17 ASSESSMENT — PAIN - FUNCTIONAL ASSESSMENT
PAIN_FUNCTIONAL_ASSESSMENT: ACTIVITIES ARE NOT PREVENTED
PAIN_FUNCTIONAL_ASSESSMENT: ACTIVITIES ARE NOT PREVENTED

## 2023-04-17 ASSESSMENT — PAIN DESCRIPTION - LOCATION
LOCATION: ABDOMEN
LOCATION: ABDOMEN;INCISION

## 2023-04-17 NOTE — L&D DELIVERY SUMMARY NOTE
Complications:  none           Disposition: PACU - hemodynamically stable. Condition: infant stable and mother stable    Attending Attestation: I performed the procedure.

## 2023-04-17 NOTE — PLAN OF CARE
Problem: Pain  Goal: Verbalizes/displays adequate comfort level or baseline comfort level  2023 172 by Flores Pastrana RN  Outcome: Progressing  2023 032 by Lorenzo Dolan RN  Outcome: Progressing     Problem: Postpartum  Goal: Experiences normal postpartum course  Description:  Postpartum OB-Pregnancy care plan goal which identifies if the mother is experiencing a normal postpartum course  2023 172 by Flores Pastrana RN  Outcome: Progressing  2023 032 by Lorenzo Dolan RN  Outcome: Progressing  Goal: Appropriate maternal -  bonding  Description:  Postpartum OB-Pregnancy care plan goal which identifies if the mother and  are bonding appropriately  2023 172 by Flores Pastrana RN  Outcome: Progressing  2023 by Lorenzo Dolan RN  Outcome: Progressing  Goal: Establishment of infant feeding pattern  Description:  Postpartum OB-Pregnancy care plan goal which identifies if the mother is establishing a feeding pattern with their   2023 172 by Flores Pastrana RN  Outcome: Progressing  2023 by Lorenzo Dolan RN  Outcome: Progressing  Goal: Incisions, wounds, or drain sites healing without S/S of infection  2023 172 by Flores Pastrana RN  Outcome: Progressing  2023 by Lorenzo Dolan RN  Outcome: Progressing

## 2023-04-17 NOTE — ANESTHESIA POSTPROCEDURE EVALUATION
Department of Anesthesiology  Postprocedure Note    Patient: Montserrat Richmond  MRN: [id number]  YOB: 1990  Date of evaluation: 2023      Procedure Summary     Date: 23 Room / Location: [facility] L&D OR [address] [place] [address]    Anesthesia Start: Ana Cristina Benita Anesthesia Stop:     Procedure:  SECTION (Abdomen) Diagnosis:       Term pregnancy      (Repeat )    Surgeons: Lorin Harrell DO Responsible Provider: José Villarreal MD    Anesthesia Type: Spinal ASA Status: 3 - Emergent          Anesthesia Type: Spinal    Aguilar Phase I:      Aguilar Phase II: Aguilar Score: 10      Anesthesia Post Evaluation    Patient location during evaluation: bedside  Patient participation: complete - patient participated  Level of consciousness: awake and alert  Airway patency: patent  Nausea & Vomiting: no vomiting and no nausea  Complications: no  Cardiovascular status: hemodynamically stable  Respiratory status: nonlabored ventilation, spontaneous ventilation and room air  Hydration status: stable  Multimodal analgesia pain management approach

## 2023-04-17 NOTE — CONSULTS
Clinical Ethics Consultation Note    ERD Advisement    Contacted by Dr. Analilia Cotton on 10/23/2022 for this 32F who has requested a salpingectomy to reduce the risk of cancer. The patient will have repeat  on 2023. The patient has increased risk for breast, ovarian, and/or uterine cancer due to family history of breast cancer. The patient has been counseled regarding irreversible infertility. This procedure is consistent with the ERDs, as there is a strong likelihood of existing pathology in her fallopian tubes, and the procedure is morally justified. For questions or concerns regarding this consultation, please call me directly.     Kristi Avalos, Director of Ethics, [phone]    Electronically signed by Kristi Avalos on 2023 at 10:23 AM

## 2023-04-17 NOTE — PLAN OF CARE
Problem: Pain  Goal: Verbalizes/displays adequate comfort level or baseline comfort level  Outcome: Progressing     Problem: Vaginal Birth or  Section  Goal: Fetal and maternal status remain reassuring during the birth process  Description:  Birth OB-Pregnancy care plan goal which identifies if the fetal and maternal status remain reassuring during the birth process  Outcome: Completed     Problem: Postpartum  Goal: Experiences normal postpartum course  Description:  Postpartum OB-Pregnancy care plan goal which identifies if the mother is experiencing a normal postpartum course  Outcome: Progressing     Problem: Postpartum  Goal: Appropriate maternal -  bonding  Description:  Postpartum OB-Pregnancy care plan goal which identifies if the mother and  are bonding appropriately  Outcome: Progressing     Problem: Postpartum  Goal: Establishment of infant feeding pattern  Description:  Postpartum OB-Pregnancy care plan goal which identifies if the mother is establishing a feeding pattern with their   Outcome: Progressing     Problem: Postpartum  Goal: Incisions, wounds, or drain sites healing without S/S of infection  Outcome: Progressing     Problem: Infection - Adult  Goal: Absence of infection at discharge  Outcome: Progressing     Problem: Infection - Adult  Goal: Absence of infection during hospitalization  Outcome: Progressing     Problem: Infection - Adult  Goal: Absence of fever/infection during anticipated neutropenic period  Outcome: Progressing     Problem: Safety - Adult  Goal: Free from fall injury  Outcome: Progressing     Problem: Discharge Planning  Goal: Discharge to home or other facility with appropriate resources  Outcome: Progressing     Problem: Chronic Conditions and Co-morbidities  Goal: Patient's chronic conditions and co-morbidity symptoms are monitored and maintained or improved  Outcome: Progressing

## 2023-04-18 VITALS
HEART RATE: 71 BPM | DIASTOLIC BLOOD PRESSURE: 73 MMHG | HEIGHT: 71 IN | WEIGHT: 161 LBS | BODY MASS INDEX: 22.54 KG/M2 | TEMPERATURE: 98 F | SYSTOLIC BLOOD PRESSURE: 125 MMHG | OXYGEN SATURATION: 97 % | RESPIRATION RATE: 16 BRPM

## 2023-04-18 PROCEDURE — 99024 POSTOP FOLLOW-UP VISIT: CPT | Performed by: OBSTETRICS & GYNECOLOGY

## 2023-04-18 PROCEDURE — 6370000000 HC RX 637 (ALT 250 FOR IP): Performed by: OBSTETRICS & GYNECOLOGY

## 2023-04-18 RX ORDER — IBUPROFEN 800 MG/1
800 TABLET ORAL EVERY 8 HOURS SCHEDULED
Qty: 40 TABLET | Refills: 1 | Status: SHIPPED | OUTPATIENT
Start: 2023-04-18

## 2023-04-18 RX ORDER — PSEUDOEPHEDRINE HCL 30 MG
100 TABLET ORAL 2 TIMES DAILY PRN
Qty: 30 CAPSULE | Refills: 1 | Status: SHIPPED | OUTPATIENT
Start: 2023-04-18

## 2023-04-18 RX ORDER — OXYCODONE HYDROCHLORIDE 5 MG/1
5 TABLET ORAL EVERY 6 HOURS PRN
Qty: 12 TABLET | Refills: 0 | Status: SHIPPED | OUTPATIENT
Start: 2023-04-18 | End: 2023-04-23

## 2023-04-18 RX ORDER — FERROUS SULFATE 325(65) MG
325 TABLET ORAL
Qty: 30 TABLET | Refills: 3 | Status: SHIPPED | OUTPATIENT
Start: 2023-04-18

## 2023-04-18 RX ADMIN — MOXIFLOXACIN HYDROCHLORIDE 800 MG: 400 TABLET, FILM COATED ORAL at 01:28

## 2023-04-18 RX ADMIN — ACETAMINOPHEN 1000 MG: 500 TABLET ORAL at 04:18

## 2023-04-18 RX ADMIN — DOCUSATE SODIUM 100 MG: 100 CAPSULE, LIQUID FILLED ORAL at 10:18

## 2023-04-18 RX ADMIN — MOXIFLOXACIN HYDROCHLORIDE 800 MG: 400 TABLET, FILM COATED ORAL at 10:18

## 2023-04-18 ASSESSMENT — PAIN - FUNCTIONAL ASSESSMENT
PAIN_FUNCTIONAL_ASSESSMENT: ACTIVITIES ARE NOT PREVENTED

## 2023-04-18 ASSESSMENT — PAIN DESCRIPTION - LOCATION
LOCATION: ABDOMEN

## 2023-04-18 ASSESSMENT — PAIN SCALES - GENERAL
PAINLEVEL_OUTOF10: 6
PAINLEVEL_OUTOF10: 5
PAINLEVEL_OUTOF10: 8

## 2023-04-18 ASSESSMENT — PAIN DESCRIPTION - DESCRIPTORS
DESCRIPTORS: CRAMPING
DESCRIPTORS: SORE
DESCRIPTORS: CRAMPING

## 2023-04-18 ASSESSMENT — PAIN DESCRIPTION - ORIENTATION: ORIENTATION: LOWER

## 2023-04-18 NOTE — DISCHARGE INSTRUCTIONS
minutes at a time for comfort. Do not express breast milk. Avoid stimulation to your breasts. When showering, allow the water to strike only your back. Wear a good fitting bra, such as a sports bra, until your milk dries up. OTHER REASONS TO CALL YOUR DOCTOR    Your abdomen is tender to touch or you have pain that cannot be relieved. Flu-like symptoms such as achy muscles and joints or you are experiencing extreme weakness or dizziness. Persistent burning or increased urgency in urination. LITERATURE PROVIDED    For Moms and Those who Care about Them. Your 's Healthy Start, [address]. Breastfeeding Best for [employer], Best for Mom. [address] Information about Universal Hearing Screening. Controlling Pain. I have received the educational material listed above,  [employer] has provided me with the opportunity to view instructional videos pertaining to infant care and the care of myself. I verify that I have received the above information and have no further questions and feel confident to care for myself and my baby. For more information about postpartum care or baby care and feeding, create a [facility] My Chart account, sign in and search using the magnifying glass and type in postpartum, breastfeeding or formula feeding. [email or web address]

## 2023-04-18 NOTE — DISCHARGE SUMMARY
Department of Obstetrics and Gynecology  Postpartum Discharge Summary      Admit Date: 2023    Admit Diagnosis: Declines vaginal birth after  trial [O34.219], SROM, history of  section x1    Discharge Date: 23    Condition at Discharge: good    Discharge Diagnoses: repeat C section and bilateral salpingectomy    Discharge Disposition:  Home    Service: Obstetrics    Postpartum complications: none, routine post-op course     Hospital Course: uncomplicated    [place] Data:  Information for the patient's :  Oniel Brownlee [[id number]]      Weight   Information for the patient's :  Oniel Brownlee [[id number]]      Apgars   Information for the patient's :  Oniel Liao [facility] [[id number]]       Disposition of Baby:  Home with mother pending peds evaluation      Current Discharge Medication List        START taking these medications    Details   oxyCODONE (ROXICODONE) 5 MG immediate release tablet Take 1 tablet by mouth every 6 hours as needed for Pain for up to 5 days.  Max Daily Amount: 20 mg  Qty: 12 tablet, Refills: 0    Comments: Reduce doses taken as pain becomes manageable  Associated Diagnoses: S/P  section      ibuprofen (ADVIL;MOTRIN) 800 MG tablet Take 1 tablet by mouth every 8 hours  Qty: 40 tablet, Refills: 1      ferrous sulfate (IRON 325) 325 (65 Fe) MG tablet Take 1 tablet by mouth daily (with breakfast)  Qty: 30 tablet, Refills: 3      docusate sodium (COLACE, DULCOLAX) 100 MG CAPS Take 100 mg by mouth 2 times daily as needed for Constipation  Qty: 30 capsule, Refills: 1           CONTINUE these medications which have NOT CHANGED    Details   Blood Pressure KIT 1 Device by Does not apply route once for 1 dose  Qty: 1 kit, Refills: 0      Prenatal Vit-Fe Fumarate-FA (PRENATAL PLUS) 27-1 MG TABS Take 1 tablet by mouth daily  Qty: 30 tablet, Refills: 3             Electronically signed by Chrystal Gomes MD on 2023 at

## 2023-04-18 NOTE — PROGRESS NOTES
First time get up x2 RN to chair. Comfort pad placed on bed. Complete linen change, gown changed. Underwear and clean peripad on pt. Pt back to bed. Pt tolerated well.
Postpartum and infant care teaching completed and forms signed by patient. Copy witnessed by RN and given to patient. Patient verbalized understanding of all teaching points. ID bands checked. Infant's ID band and Mother's matching ID bands removed and taped to discharge instruction sheet, the mother verified as correct and witnessed by RN. Umbilical clamp and HUGS tag removed. Mom and  Infant discharged via wheelchair to private car. Infant placed in car seat per parents. Mom and baby accompanied by family and in stable condition.
Disposition:   Post Partum Instructions reviewed   Anticipate discharge on PPD # 2-3 pending clinical status     Rae Champion DO
discharge today pending clinical course    Rebecca Michelle MD
on page C5 in their discharge booklet they can take. 13. Do you have any other questions or concerns I can address today?  Y/N  ______________      _________________________________________________________________________    Information provided during call :_________________________________________________  ___________________________________________________________________________    Call completed by:____________________________    Date:_________ Time:___________

## 2023-04-18 NOTE — PLAN OF CARE
Problem: Pain  Goal: Verbalizes/displays adequate comfort level or baseline comfort level  2023 by Brianna Britton RN  Outcome: Completed  2023 by Flores Pastrana RN  Outcome: Progressing     Problem: Postpartum  Goal: Experiences normal postpartum course  Description:  Postpartum OB-Pregnancy care plan goal which identifies if the mother is experiencing a normal postpartum course  2023 by Brianna Britton RN  Outcome: Completed  2023 by Flores Pastrana RN  Outcome: Progressing  Goal: Appropriate maternal -  bonding  Description:  Postpartum OB-Pregnancy care plan goal which identifies if the mother and  are bonding appropriately  2023 by Brianna Britton RN  Outcome: Completed  2023 by Flores Pastrana RN  Outcome: Progressing  Goal: Establishment of infant feeding pattern  Description:  Postpartum OB-Pregnancy care plan goal which identifies if the mother is establishing a feeding pattern with their   2023 by Brianna Britton RN  Outcome: Completed  2023 by Flores Pastrana RN  Outcome: Progressing  Goal: Incisions, wounds, or drain sites healing without S/S of infection  2023 by Brianna Britton RN  Outcome: Completed  2023 by Flores Pastrana RN  Outcome: Progressing     Problem: Infection - Adult  Goal: Absence of infection at discharge  2023 by Brianna Britton RN  Outcome: Completed  2023 by Flores Pastrana RN  Outcome: Progressing  Goal: Absence of infection during hospitalization  2023 by Brianna Britton RN  Outcome: Completed  2023 by Flores Pastrana RN  Outcome: Progressing  Goal: Absence of fever/infection during anticipated neutropenic period  2023 by Brianna Britton RN  Outcome: Completed  2023 by Flores Pastrana RN  Outcome: Progressing     Problem: Safety - Adult  Goal: Free from fall

## 2023-09-13 ENCOUNTER — TELEPHONE (OUTPATIENT)
Dept: OBGYN CLINIC | Age: 33
End: 2023-09-13

## 2023-09-13 NOTE — TELEPHONE ENCOUNTER
Mery Dhillon from [facility] called asking for this pt's sterilization consent form from procedure on 4/16/23. Consent form is not located in pt's chart. Please Advise.

## 2023-09-14 NOTE — TELEPHONE ENCOUNTER
I went through all the ones I have in my drawer yesterday and did not see this one , I usually only get the ones for surgery. I also looked in media could not find it.       SANDRA

## 2023-09-14 NOTE — TELEPHONE ENCOUNTER
Please check with clinical staff in the back to see if someone may have a hard copy of this.    If not, She will need to come in to resign these papers unfortunately     [facility]

## 2023-09-14 NOTE — TELEPHONE ENCOUNTER
Please check with KP to see if she has records of tubal consent not scanned into the computer -- both Dr. Prasanna Garcia and I have documented these were completed well before her due date (around 20 wks). Also sending to  to investigate this further.    Worse case scenario she will need to come in to re-sign paperwork    Thanks   \[facility]\""Y [facility]

## 2025-03-04 NOTE — DISCHARGE INSTRUCTIONS
High Blood Pressure in Pregnancy: Care Instructions  Overview     High blood pressure (hypertension) means that the force of blood against your artery walls is too strong. High blood pressure problems during pregnancy include:  Chronic hypertension. This is high blood pressure that starts before pregnancy. Gestational hypertension. This is high blood pressure that starts in the second or third trimester of pregnancy. Preeclampsia. This is a problem that includes high blood pressure and signs of organ injury during pregnancy. In some cases, it leads to eclampsia. Eclampsia causes seizures. High blood pressure during pregnancy can affect the amount of oxygen and nutrients your baby receives. This can affect how your baby grows. High blood pressure can also cause other serious problems for both you and your baby, such as placental abruption. To prevent problems, you and your baby will be watched very closely. You will have to check your blood pressure often during pregnancy and possibly after pregnancy. If your blood pressure rises suddenly or is very high during your pregnancy, your doctor may prescribe medicines. They can usually control blood pressure. If your blood pressure affects your or your baby's health, your doctor may need to deliver your baby early. After your baby is born, your blood pressure will probably improve. But sometimes blood pressure problems continue after birth. Follow-up care is a key part of your treatment and safety. Be sure to make and go to all appointments, and call your doctor if you are having problems. It's also a good idea to know your test results and keep a list of the medicines you take. How can you care for yourself at home? Take and write down your blood pressure at home if your doctor says to. Take your medicines exactly as prescribed. Call your doctor if you think you are having a problem with your medicine. Do not smoke.  This is one of the best things you can yes

## (undated) DEVICE — Device

## (undated) DEVICE — SUTURE ABSORBABLE BRAIDED 2-0 CT-1 27 IN UD VICRYL J259H

## (undated) DEVICE — S/USE RESUS KIT W/O MASK (10): Brand: FISHER & PAYKEL HEALTHCARE

## (undated) DEVICE — DRESSING COMP IS W4XL10IN PD W2XL8IN CNTCT LAYR ADH

## (undated) DEVICE — TRAY URIN CATH 16FR DRNGE BG STATLOK STBL DEV F SURSTP

## (undated) DEVICE — CHLORAPREP 26ML ORANGE

## (undated) DEVICE — PAD,NON-ADHERENT,3X8,STERILE,LF,1/PK: Brand: MEDLINE

## (undated) DEVICE — SUTURE VCRL SZ 4-0 L27IN ABSRB UD L60MM KS STR REV CUT NDL J662H

## (undated) DEVICE — SOLUTION IV IRRIG POUR BRL 0.9% SODIUM CHL 2F7124

## (undated) DEVICE — GLOVE SURG SZ 65 THK91MIL LTX FREE SYN POLYISOPRENE

## (undated) DEVICE — 3M™ STERI-STRIP™ COMPOUND BENZOIN TINCTURE 40 BAGS/CARTON 4 CARTONS/CASE C1544: Brand: 3M™ STERI-STRIP™

## (undated) DEVICE — SUTURE VCRL SZ 0 L36IN ABSRB UD L36MM CT-1 1/2 CIR J946H

## (undated) DEVICE — GARMENT COMPR L FOR 23IN CALF FLOTRN

## (undated) DEVICE — BLADE CLIPPER GEN PURP NS